# Patient Record
Sex: FEMALE | Race: WHITE | NOT HISPANIC OR LATINO | Employment: OTHER | URBAN - METROPOLITAN AREA
[De-identification: names, ages, dates, MRNs, and addresses within clinical notes are randomized per-mention and may not be internally consistent; named-entity substitution may affect disease eponyms.]

---

## 2017-01-20 ENCOUNTER — OFFICE VISIT (OUTPATIENT)
Dept: CARDIOLOGY | Facility: CLINIC | Age: 82
End: 2017-01-20

## 2017-01-20 VITALS
SYSTOLIC BLOOD PRESSURE: 120 MMHG | BODY MASS INDEX: 27.94 KG/M2 | HEART RATE: 90 BPM | DIASTOLIC BLOOD PRESSURE: 58 MMHG | WEIGHT: 142.3 LBS | HEIGHT: 60 IN

## 2017-01-20 DIAGNOSIS — I48.20 CHRONIC ATRIAL FIBRILLATION (HCC): Primary | ICD-10-CM

## 2017-01-20 DIAGNOSIS — E78.5 HYPERLIPIDEMIA, UNSPECIFIED HYPERLIPIDEMIA TYPE: ICD-10-CM

## 2017-01-20 DIAGNOSIS — I10 ESSENTIAL HYPERTENSION: ICD-10-CM

## 2017-01-20 PROCEDURE — 99214 OFFICE O/P EST MOD 30 MIN: CPT | Performed by: INTERNAL MEDICINE

## 2017-01-20 PROCEDURE — 93000 ELECTROCARDIOGRAM COMPLETE: CPT | Performed by: INTERNAL MEDICINE

## 2017-01-20 RX ORDER — CHLORTHALIDONE 25 MG/1
12.5 TABLET ORAL DAILY
Status: ON HOLD | COMMUNITY
Start: 2017-01-20 | End: 2017-08-25

## 2017-01-20 NOTE — PROGRESS NOTES
Date of Office Visit: 2017  Encounter Provider: Chana Jarvis MD  Place of Service: [facility] CARDIOLOGY  Patient Name: Gaby Diaz  :2/10/1931      Patient ID:  Gaby Diaz is a 85 y.o. female is here for  followup for a fib.         History of Present Illness    She has a known history of chronic underlying atrial fibrillation, hypertension, and  hyperlipidemia. She had a stress nuclear study done in 2010 with a preoperative  evaluation showing anterior ischemia but she had no active angina. She went on to get a  left knee replacement done. She had previously had a right knee replacement done.  Her last echocardiogram was done on 2010, showing an ejection fraction of  54% with marked biatrial enlargement, aortic valve calcification without stenosis, mild  mitral insufficiency, mild tricuspid insufficiency, and normal right ventricular systolic  Pressure.     She fell and fractured her hip and was treated at [facility]. It was the  left hip for which she got a replacement but she also has a lot of osteoarthritis in the  right hip also, and at some point may need that replaced as well.       The patient is doing okay.  She is getting around with a walker pretty well.   Her appetite is good.   She has no exertional chest pain or difficulty breathing.  She does not feel heart racing or skipping, and she has had no dizziness.  Her pulse pressure is kind of wide, given her blood pressure is 120/58.   As she ages, we talked about the fact that Chlorthalidone may not be the best medication for her. I do  know she gets some lower extremity edema, but I am always concerned about the risks of falls in older people taking Chlorthalidone.  Given that, we talked about decreasing her Chlorthalidone to 12.5 mg daily, and just having her elevate her legs to help with the edema in her lower extremities.  Overall, she has been feeling  pretty well.          .    Past Medical History   Diagnosis Date   • Arthritis of back    • Arthritis of neck    • Atrial fibrillation    • Hip arthrosis    • Hyperlipidemia    • Hypertension    • Knee swelling    • Myocardial ischemia      inferior wall   • Stroke syndrome          Past Surgical History   Procedure Laterality Date   • Hip surgery     • Replacement total knee Bilateral        Current Outpatient Prescriptions on File Prior to Visit   Medication Sig Dispense Refill   • chlorthalidone (HYGROTEN) 25 MG tablet Take  by mouth daily.     • ferrous sulfate 325 (65 FE) MG EC tablet Take  by mouth daily.     • FLUoxetine (PROzac) 20 MG capsule Take  by mouth daily.     • lisinopril (PRINIVIL,ZESTRIL) 5 MG tablet Take  by mouth daily.     • nitroglycerin (NITROSTAT) 0.4 MG SL tablet Place  under the tongue.     • simvastatin (ZOCOR) 20 MG tablet Take  by mouth.     • warfarin (COUMADIN) 5 MG tablet Take  by mouth.     • [DISCONTINUED] nitroglycerin (NITRO-BID) 6.5 MG CR capsule        No current facility-administered medications on file prior to visit.        Social History     Social History   • Marital status:      Spouse name: N/A   • Number of children: N/A   • Years of education: N/A     Occupational History   • Not on file.     Social History Main Topics   • Smoking status: Never Smoker   • Smokeless tobacco: Not on file   • Alcohol use No   • Drug use: Not on file   • Sexual activity: Not on file     Other Topics Concern   • Not on file     Social History Narrative           Review of Systems   Constitution: Negative.   HENT: Negative for congestion and headaches.    Eyes: Negative for vision loss in left eye and vision loss in right eye.   Respiratory: Negative.  Negative for cough, hemoptysis, shortness of breath, sleep disturbances due to breathing, snoring, sputum production and wheezing.    Endocrine: Negative.    Hematologic/Lymphatic: Negative.    Skin: Negative for poor wound healing and  "rash.   Musculoskeletal: Negative for falls, gout, muscle cramps and myalgias.   Gastrointestinal: Negative for abdominal pain, diarrhea, dysphagia, hematemesis, melena, nausea and vomiting.   Neurological: Negative for excessive daytime sleepiness, dizziness, light-headedness, loss of balance, seizures and vertigo.   Psychiatric/Behavioral: Negative for depression and substance abuse. The patient is not nervous/anxious.        Procedures    ECG 12 Lead  Date/Time: 1/20/2017 2:38 PM  Performed by: RONEL DAVENPORT  Authorized by: RONEL DAVENPORT   Comparison: compared with previous ECG   Rhythm: atrial fibrillation  Clinical impression: abnormal ECG               Objective:      Vitals:    01/20/17 1417   BP: 120/58   BP Location: Right arm   Patient Position: Sitting   Pulse: 90   Weight: 142 lb 4.8 oz (64.5 kg)   Height: 60\" (152.4 cm)     Body mass index is 27.79 kg/(m^2).    Physical Exam   Constitutional: She is oriented to person, place, and time. She appears well-developed and well-nourished. No distress.   HENT:   Head: Normocephalic and atraumatic.   Eyes: Conjunctivae are normal. No scleral icterus.   Neck: Neck supple. No JVD present. Carotid bruit is not present. No thyromegaly present.   Cardiovascular: Normal rate, S1 normal, S2 normal, normal heart sounds and intact distal pulses.  An irregularly irregular rhythm present.  No extrasystoles are present. PMI is not displaced.  Exam reveals no gallop.    No murmur heard.  Pulses:       Carotid pulses are 2+ on the right side, and 2+ on the left side.       Radial pulses are 2+ on the right side, and 2+ on the left side.        Dorsalis pedis pulses are 2+ on the right side, and 2+ on the left side.        Posterior tibial pulses are 2+ on the right side, and 2+ on the left side.   Left LE 1+ edema   Pulmonary/Chest: Effort normal and breath sounds normal. No respiratory distress. She has no wheezes. She has no rhonchi. She has no rales. She " exhibits no tenderness.   Abdominal: Soft. Bowel sounds are normal. She exhibits no distension, no abdominal bruit and no mass. There is no tenderness.   Musculoskeletal: She exhibits no edema or deformity.   Lymphadenopathy:     She has no cervical adenopathy.   Neurological: She is alert and oriented to person, place, and time. No cranial nerve deficit.   Skin: Skin is warm and dry. No rash noted. She is not diaphoretic. No cyanosis. No pallor. Nails show no clubbing.   Psychiatric: She has a normal mood and affect. Judgment normal.   Vitals reviewed.      Lab Review:       Assessment:      Diagnosis Plan   1. Chronic atrial fibrillation     2. Essential hypertension     3. Hyperlipidemia, unspecified hyperlipidemia type       1. Atrial fibrillation. Heart rate is well controlled. I do not plan to make any  medication changes. She is on Coumadin.  2. Hypertension, well controlled.  3. Hyperlipidemia, per Dr. Ford.  4. History of hypothyroidism, stable.   5. History of strokes.  6. Family history of cardiovascular disease.  7. Osteoarthritis.  8. Fatigue.  9. Abnormal stress study, asymptomatic with mild anterior ischemia treated medically.   10. History of biatrial enlargement.     Plan:       Decrease chlorthalidone to 12.5mg daily, see back in 6 months.

## 2017-01-20 NOTE — MR AVS SNAPSHOT
Gaby Diaz   1/20/2017 2:15 PM   Office Visit    Dept Phone:  [phone]   Encounter #:  [id number]    Provider:  Chana Jarvis MD   Department:  [facility] CARDIOLOGY                Your Full Care Plan              Today's Medication Changes          These changes are accurate as of: 1/20/17  2:55 PM.  If you have any questions, ask your nurse or doctor.               Medication(s)that have changed:     chlorthalidone 25 MG tablet   Commonly known as:  HYGROTON   Take 0.5 tablets by mouth Daily.   What changed:  how much to take   Changed by:  Chana Jarvis MD       NITROSTAT 0.4 MG SL tablet   Generic drug:  nitroglycerin   Place  under the tongue.   What changed:  Another medication with the same name was removed. Continue taking this medication, and follow the directions you see here.   Changed by:  Zeyad Meza MD                  Your Updated Medication List          This list is accurate as of: 1/20/17  2:55 PM.  Always use your most recent med list.                chlorthalidone 25 MG tablet   Commonly known as:  HYGROTON       ferrous sulfate 325 (65 FE) MG EC tablet       FLUoxetine 20 MG capsule   Commonly known as:  PROzac       lisinopril 5 MG tablet   Commonly known as:  PRINIVIL,ZESTRIL       NITROSTAT 0.4 MG SL tablet   Generic drug:  nitroglycerin       simvastatin 20 MG tablet   Commonly known as:  ZOCOR       warfarin 5 MG tablet   Commonly known as:  COUMADIN               We Performed the Following     ECG 12 Lead       You Were Diagnosed With        Codes Comments    Chronic atrial fibrillation    -  Primary ICD-10-CM: I48.2  ICD-9-CM: 427.31     Essential hypertension     ICD-10-CM: I10  ICD-9-CM: 401.9     Hyperlipidemia, unspecified hyperlipidemia type     ICD-10-CM: E78.5  ICD-9-CM: 272.4       Instructions     None    Patient Instructions History      Upcoming Appointments     Visit Type Date Time Department    "FOLLOW UP 2017  2:15 PM [facility]    FOLLOW UP 2017 12:30 PM [facility]      MyChart Signup     [email or web address] allows you to send messages to your doctor, view your test results, renew your prescriptions, schedule appointments, and more. To sign up, go to ticketscript and click on the Sign Up Now link in the New User? box. Enter your [email or web address] Activation Code exactly as it appears below along with the last four digits of your Social Security Number and your Date of Birth () to complete the sign-up process. If you do not sign up before the expiration date, you must request a new code.    [email or web address] Activation Code: [email or web address]  Expires: 2/3/2017  2:55 PM    If you have questions, you can email [email or web address] or call [phone] to talk to our [email or web address] staff. Remember, [email or web address] is NOT to be used for urgent needs. For medical emergencies, dial 911.               Other Info from Your Visit           Your Appointments     2017 12:30 PM EDT   Follow Up with Chana Jarvis MD   [facility] [place] [facility]ARDIOLOGY (--)    [address] [place] [id number] [phone]           Arrive 15 minutes prior to appointment.              Allergies     No Known Allergies      Reason for Visit     Atrial Fibrillation           Vital Signs     Blood Pressure Pulse Height Weight Body Mass Index Smoking Status    120/58 (BP Location: Right arm, Patient Position: Sitting) 90 60\" (152.4 cm) 142 lb 4.8 oz (64.5 kg) 27.79 kg/m2 Never Smoker      Problems and Diagnoses Noted     Atrial fibrillation (irregular heartbeat)    High cholesterol or triglycerides    High blood pressure        "

## 2017-04-24 DIAGNOSIS — M16.11 PRIMARY OSTEOARTHRITIS OF RIGHT HIP: Primary | ICD-10-CM

## 2017-04-25 ENCOUNTER — TELEPHONE (OUTPATIENT)
Dept: ORTHOPEDIC SURGERY | Facility: CLINIC | Age: 82
End: 2017-04-25

## 2017-04-28 ENCOUNTER — APPOINTMENT (OUTPATIENT)
Dept: GENERAL RADIOLOGY | Facility: HOSPITAL | Age: 82
End: 2017-04-28
Attending: ORTHOPAEDIC SURGERY

## 2017-05-04 ENCOUNTER — HOSPITAL ENCOUNTER (OUTPATIENT)
Dept: GENERAL RADIOLOGY | Facility: HOSPITAL | Age: 82
Discharge: HOME | End: 2017-05-04
Attending: ORTHOPAEDIC SURGERY | Admitting: ORTHOPAEDIC SURGERY

## 2017-05-04 DIAGNOSIS — M16.11 PRIMARY OSTEOARTHRITIS OF RIGHT HIP: ICD-10-CM

## 2017-05-04 PROCEDURE — 25010000002 TRIAMCINOLONE PER 10 MG: Performed by: ORTHOPAEDIC SURGERY

## 2017-05-04 PROCEDURE — 0 IOPAMIDOL 61 % SOLUTION: Performed by: ORTHOPAEDIC SURGERY

## 2017-05-04 PROCEDURE — 77002 NEEDLE LOCALIZATION BY XRAY: CPT

## 2017-05-04 RX ORDER — LIDOCAINE HYDROCHLORIDE 10 MG/ML
3 INJECTION, SOLUTION INFILTRATION; PERINEURAL ONCE
Status: COMPLETED | OUTPATIENT
Start: 2017-05-04 | End: 2017-05-04

## 2017-05-04 RX ORDER — TRIAMCINOLONE ACETONIDE 40 MG/ML
40 INJECTION, SUSPENSION INTRA-ARTICULAR; INTRAMUSCULAR ONCE
Status: COMPLETED | OUTPATIENT
Start: 2017-05-04 | End: 2017-05-04

## 2017-05-04 RX ORDER — LIDOCAINE HYDROCHLORIDE 10 MG/ML
5 INJECTION, SOLUTION INFILTRATION; PERINEURAL ONCE
Status: COMPLETED | OUTPATIENT
Start: 2017-05-04 | End: 2017-05-04

## 2017-05-04 RX ADMIN — IOPAMIDOL 10 ML: 612 INJECTION, SOLUTION INTRAVENOUS at 13:55

## 2017-05-04 RX ADMIN — TRIAMCINOLONE ACETONIDE 40 MG: 40 INJECTION, SUSPENSION INTRA-ARTICULAR; INTRAMUSCULAR at 14:00

## 2017-05-04 RX ADMIN — LIDOCAINE HYDROCHLORIDE 3 ML: 10 INJECTION, SOLUTION INFILTRATION; PERINEURAL at 13:50

## 2017-05-04 RX ADMIN — LIDOCAINE HYDROCHLORIDE 5 ML: 10 INJECTION, SOLUTION INFILTRATION; PERINEURAL at 14:00

## 2017-08-04 ENCOUNTER — OFFICE VISIT (OUTPATIENT)
Dept: CARDIOLOGY | Facility: CLINIC | Age: 82
End: 2017-08-04

## 2017-08-04 VITALS
HEART RATE: 89 BPM | DIASTOLIC BLOOD PRESSURE: 80 MMHG | BODY MASS INDEX: 27 KG/M2 | HEIGHT: 61 IN | SYSTOLIC BLOOD PRESSURE: 124 MMHG | WEIGHT: 143 LBS

## 2017-08-04 DIAGNOSIS — E78.5 HYPERLIPIDEMIA, UNSPECIFIED HYPERLIPIDEMIA TYPE: ICD-10-CM

## 2017-08-04 DIAGNOSIS — I48.20 CHRONIC ATRIAL FIBRILLATION (HCC): Primary | ICD-10-CM

## 2017-08-04 DIAGNOSIS — I10 ESSENTIAL HYPERTENSION: ICD-10-CM

## 2017-08-04 PROCEDURE — 99214 OFFICE O/P EST MOD 30 MIN: CPT | Performed by: INTERNAL MEDICINE

## 2017-08-04 PROCEDURE — 93000 ELECTROCARDIOGRAM COMPLETE: CPT | Performed by: INTERNAL MEDICINE

## 2017-08-04 RX ORDER — POTASSIUM CITRATE 10 MEQ/1
1 TABLET, EXTENDED RELEASE ORAL DAILY
Status: ON HOLD | COMMUNITY
Start: 2017-07-27 | End: 2017-08-25

## 2017-08-04 NOTE — PROGRESS NOTES
Date of Office Visit: 2017  Encounter Provider: Chana Jarvis MD  Place of Service: [facility] CARDIOLOGY  Patient Name: Gaby Diaz  :2/10/1931      Patient ID:  Gaby Diaz is a 86 y.o. female is here for  followup for atrial fibrillation        History of Present Illness    She has a known history of chronic underlying atrial fibrillation, hypertension, and  hyperlipidemia. She had a stress nuclear study done in 2010 with a preoperative  evaluation showing anterior ischemia but she had no active angina. She went on to get a  left knee replacement done. She had previously had a right knee replacement done.  Her last echocardiogram was done on 2010, showing an ejection fraction of  54% with marked biatrial enlargement, aortic valve calcification without stenosis, mild  mitral insufficiency, mild tricuspid insufficiency, and normal right ventricular systolic  Pressure.      She fell and fractured her hip and was treated at [facility]. It was the  left hip for which she got a replacement but she also has a lot of osteoarthritis in the  right hip also, and at some point may need that replaced as well.       She is walking with a walker and doing pretty good.  Her appetite is good.  She has no exertional chest pain or difficulty breathing.  She has no orthopnea or PND.  She doesn't feel her heart racing or skipping.  She has no dizziness.  She's had lower extremity edema which is got worse over the summer.  The right leg is worse the left.  She has a lot of right hip pain and is preparing to have right hip replacement.  Overall she feels like she's doing fairly well.    Past Medical History:   Diagnosis Date   • Arthritis of back    • Arthritis of neck    • Atrial fibrillation    • Hip arthrosis    • Hyperlipidemia    • Hypertension    • Knee swelling    • Myocardial ischemia     inferior wall   • Stroke syndrome          Past  Surgical History:   Procedure Laterality Date   • HIP SURGERY     • REPLACEMENT TOTAL KNEE Bilateral        Current Outpatient Prescriptions on File Prior to Visit   Medication Sig Dispense Refill   • chlorthalidone (HYGROTON) 25 MG tablet Take 0.5 tablets by mouth Daily.     • FLUoxetine (PROzac) 20 MG capsule Take  by mouth daily.     • lisinopril (PRINIVIL,ZESTRIL) 5 MG tablet Take  by mouth daily.     • nitroglycerin (NITROSTAT) 0.4 MG SL tablet Place  under the tongue.     • simvastatin (ZOCOR) 20 MG tablet Take  by mouth.     • warfarin (COUMADIN) 5 MG tablet Take  by mouth.     • [DISCONTINUED] ferrous sulfate 325 (65 FE) MG EC tablet Take  by mouth daily.       No current facility-administered medications on file prior to visit.        Social History     Social History   • Marital status:      Spouse name: N/A   • Number of children: N/A   • Years of education: N/A     Occupational History   • Not on file.     Social History Main Topics   • Smoking status: Never Smoker   • Smokeless tobacco: Not on file   • Alcohol use No   • Drug use: Not on file   • Sexual activity: Not on file     Other Topics Concern   • Not on file     Social History Narrative           Review of Systems   Constitution: Negative.   HENT: Negative for congestion and headaches.    Eyes: Negative for vision loss in left eye and vision loss in right eye.   Respiratory: Negative.  Negative for cough, hemoptysis, shortness of breath, sleep disturbances due to breathing, snoring, sputum production and wheezing.    Endocrine: Negative.    Hematologic/Lymphatic: Negative.    Skin: Negative for poor wound healing and rash.   Musculoskeletal: Positive for joint pain and joint swelling. Negative for falls, gout, muscle cramps and myalgias.   Gastrointestinal: Negative for abdominal pain, diarrhea, dysphagia, hematemesis, melena, nausea and vomiting.   Neurological: Negative for excessive daytime sleepiness, dizziness, light-headedness, loss  "of balance, seizures and vertigo.   Psychiatric/Behavioral: Negative for depression and substance abuse. The patient is not nervous/anxious.        Procedures    ECG 12 Lead  Date/Time: 8/4/2017 1:58 PM  Performed by: RONEL DAVENPORT  Authorized by: RONEL DAVENPORT   Comparison: compared with previous ECG   Similar to previous ECG  Rhythm: atrial fibrillation  QRS axis: left  Clinical impression: abnormal ECG               Objective:      Vitals:    08/04/17 1343   BP: 124/80   BP Location: Left arm   Patient Position: Sitting   Pulse: 89   Weight: 143 lb (64.9 kg)   Height: 61\" (154.9 cm)     Body mass index is 27.02 kg/(m^2).    Physical Exam   Constitutional: She is oriented to person, place, and time. She appears well-developed and well-nourished. No distress.   HENT:   Head: Normocephalic and atraumatic.   Eyes: Conjunctivae are normal. No scleral icterus.   Neck: Neck supple. No JVD present. Carotid bruit is not present. No thyromegaly present.   Cardiovascular: Normal rate, S1 normal, S2 normal, normal heart sounds and intact distal pulses.  An irregularly irregular rhythm present.  No extrasystoles are present. PMI is not displaced.  Exam reveals no gallop.    No murmur heard.  Pulses:       Carotid pulses are 2+ on the right side, and 2+ on the left side.       Radial pulses are 2+ on the right side, and 2+ on the left side.        Dorsalis pedis pulses are 2+ on the right side, and 2+ on the left side.        Posterior tibial pulses are 2+ on the right side, and 2+ on the left side.   1+ LE edema   Pulmonary/Chest: Effort normal and breath sounds normal. No respiratory distress. She has no wheezes. She has no rhonchi. She has no rales. She exhibits no tenderness.   Abdominal: Soft. Bowel sounds are normal. She exhibits no distension, no abdominal bruit and no mass. There is no tenderness.   Musculoskeletal: She exhibits no edema or deformity.   Lymphadenopathy:     She has no cervical adenopathy. "   Neurological: She is alert and oriented to person, place, and time. No cranial nerve deficit.   Skin: Skin is warm and dry. No rash noted. She is not diaphoretic. No cyanosis. No pallor. Nails show no clubbing.   Psychiatric: She has a normal mood and affect. Judgment normal.   Vitals reviewed.      Lab Review:       Assessment:      Diagnosis Plan   1. Chronic atrial fibrillation     2. Hyperlipidemia, unspecified hyperlipidemia type     3. Essential hypertension          1. Atrial fibrillation. Heart rate is well controlled. I do not plan to make any  medication changes. She is on Coumadin.  2. Hypertension, well controlled.  3. Hyperlipidemia, per Dr. Ford.  4. History of hypothyroidism, stable.   5. History of strokes.  6. Family history of cardiovascular disease.  7. Osteoarthritis.  8. Fatigue.  9. Abnormal stress study, asymptomatic with mild anterior ischemia treated medically. She has no angina or CHF.   10. History of biatrial enlargement.  11. LE edema due to venous insufficiency, OA and warm summer weather.  Use chlorthalidone.   12. Right hip OA, she is low risk for surgery and may proceed after echo.  Would get this first.      Plan:       See back in 6 months, no changes.      Atrial Fibrillation and Atrial Flutter  Assessment  • The patient has permanent atrial fibrillation  • This is non-valvular in etiology  • The patient's CHADS2-VASc score is 4  • A [id number] score of 2 or more is considered a high risk for a thromboembolic event  • Warfarin prescribed    Plan  • Continue in atrial fibrillation with rate control  • Continue warfarin for antithrombotic therapy, bleeding issues discussed

## 2017-08-12 ENCOUNTER — LAB (OUTPATIENT)
Dept: LAB | Facility: HOSPITAL | Age: 82
End: 2017-08-12

## 2017-08-12 ENCOUNTER — TRANSCRIBE ORDERS (OUTPATIENT)
Dept: ADMINISTRATIVE | Facility: HOSPITAL | Age: 82
End: 2017-08-12

## 2017-08-12 DIAGNOSIS — Z79.899 DRUG THERAPY: ICD-10-CM

## 2017-08-12 DIAGNOSIS — Z79.899 DRUG THERAPY: Primary | ICD-10-CM

## 2017-08-12 LAB
INR PPP: 2.29 (ref 0.9–1.1)
PROTHROMBIN TIME: 25.6 SECONDS (ref 12.1–15)

## 2017-08-12 PROCEDURE — 85610 PROTHROMBIN TIME: CPT

## 2017-08-12 PROCEDURE — 36415 COLL VENOUS BLD VENIPUNCTURE: CPT

## 2017-08-14 ENCOUNTER — HOSPITAL ENCOUNTER (OUTPATIENT)
Dept: CARDIOLOGY | Facility: HOSPITAL | Age: 82
Discharge: HOME | End: 2017-08-14
Attending: INTERNAL MEDICINE | Admitting: INTERNAL MEDICINE

## 2017-08-14 VITALS
DIASTOLIC BLOOD PRESSURE: 69 MMHG | SYSTOLIC BLOOD PRESSURE: 140 MMHG | HEIGHT: 61 IN | WEIGHT: 143 LBS | BODY MASS INDEX: 27 KG/M2

## 2017-08-14 DIAGNOSIS — I10 ESSENTIAL HYPERTENSION: ICD-10-CM

## 2017-08-14 DIAGNOSIS — I48.20 CHRONIC ATRIAL FIBRILLATION (HCC): ICD-10-CM

## 2017-08-14 LAB
ASCENDING AORTA: 3.3 CM
BH CV ECHO MEAS - ACS: 1.6 CM
BH CV ECHO MEAS - AI DEC SLOPE: 190 CM/SEC^2
BH CV ECHO MEAS - AI MAX PG: 67.9 MMHG
BH CV ECHO MEAS - AI MAX VEL: 412 CM/SEC
BH CV ECHO MEAS - AI P1/2T: 635.1 MSEC
BH CV ECHO MEAS - AO MAX PG (FULL): 0.1 MMHG
BH CV ECHO MEAS - AO MAX PG: 6.6 MMHG
BH CV ECHO MEAS - AO MEAN PG (FULL): 0 MMHG
BH CV ECHO MEAS - AO MEAN PG: 3 MMHG
BH CV ECHO MEAS - AO ROOT AREA (BSA CORRECTED): 1.8
BH CV ECHO MEAS - AO ROOT AREA: 6.6 CM^2
BH CV ECHO MEAS - AO ROOT DIAM: 2.9 CM
BH CV ECHO MEAS - AO V2 MAX: 128 CM/SEC
BH CV ECHO MEAS - AO V2 MEAN: 83.9 CM/SEC
BH CV ECHO MEAS - AO V2 VTI: 24.5 CM
BH CV ECHO MEAS - ASC AORTA: 3.3 CM
BH CV ECHO MEAS - AVA(I,A): 3.1 CM^2
BH CV ECHO MEAS - AVA(I,D): 3.1 CM^2
BH CV ECHO MEAS - AVA(V,A): 3.1 CM^2
BH CV ECHO MEAS - AVA(V,D): 3.1 CM^2
BH CV ECHO MEAS - BSA(HAYCOCK): 1.7 M^2
BH CV ECHO MEAS - BSA: 1.6 M^2
BH CV ECHO MEAS - BZI_BMI: 27 KILOGRAMS/M^2
BH CV ECHO MEAS - BZI_METRIC_HEIGHT: 154.9 CM
BH CV ECHO MEAS - BZI_METRIC_WEIGHT: 64.9 KG
BH CV ECHO MEAS - CONTRAST EF (2CH): 52.1 ML/M^2
BH CV ECHO MEAS - CONTRAST EF 4CH: 60.1 ML/M^2
BH CV ECHO MEAS - EDV(CUBED): 106.5 ML
BH CV ECHO MEAS - EDV(MOD-SP2): 74.6 ML
BH CV ECHO MEAS - EDV(MOD-SP4): 80.9 ML
BH CV ECHO MEAS - EDV(TEICH): 104.4 ML
BH CV ECHO MEAS - EF(CUBED): 70.1 %
BH CV ECHO MEAS - EF(MOD-SP2): 52.1 %
BH CV ECHO MEAS - EF(MOD-SP4): 60.1 %
BH CV ECHO MEAS - EF(TEICH): 61.7 %
BH CV ECHO MEAS - ESV(CUBED): 31.9 ML
BH CV ECHO MEAS - ESV(MOD-SP2): 35.7 ML
BH CV ECHO MEAS - ESV(MOD-SP4): 32.3 ML
BH CV ECHO MEAS - ESV(TEICH): 40 ML
BH CV ECHO MEAS - FS: 33.1 %
BH CV ECHO MEAS - IVS/LVPW: 1.1
BH CV ECHO MEAS - IVSD: 0.93 CM
BH CV ECHO MEAS - LAT PEAK E' VEL: 15 CM/SEC
BH CV ECHO MEAS - LV DIASTOLIC VOL/BSA (35-75): 49.4 ML/M^2
BH CV ECHO MEAS - LV MASS(C)D: 142.8 GRAMS
BH CV ECHO MEAS - LV MASS(C)DI: 87.2 GRAMS/M^2
BH CV ECHO MEAS - LV MAX PG: 6.5 MMHG
BH CV ECHO MEAS - LV MEAN PG: 3 MMHG
BH CV ECHO MEAS - LV SYSTOLIC VOL/BSA (12-30): 19.7 ML/M^2
BH CV ECHO MEAS - LV V1 MAX: 127 CM/SEC
BH CV ECHO MEAS - LV V1 MEAN: 73.2 CM/SEC
BH CV ECHO MEAS - LV V1 VTI: 23.9 CM
BH CV ECHO MEAS - LVIDD: 4.7 CM
BH CV ECHO MEAS - LVIDS: 3.2 CM
BH CV ECHO MEAS - LVLD AP2: 6.9 CM
BH CV ECHO MEAS - LVLD AP4: 7 CM
BH CV ECHO MEAS - LVLS AP2: 5.7 CM
BH CV ECHO MEAS - LVLS AP4: 5.7 CM
BH CV ECHO MEAS - LVOT AREA (M): 3.1 CM^2
BH CV ECHO MEAS - LVOT AREA: 3.1 CM^2
BH CV ECHO MEAS - LVOT DIAM: 2 CM
BH CV ECHO MEAS - LVPWD: 0.85 CM
BH CV ECHO MEAS - MED PEAK E' VEL: 11 CM/SEC
BH CV ECHO MEAS - MR MAX PG: 77.9 MMHG
BH CV ECHO MEAS - MR MAX VEL: 435.3 CM/SEC
BH CV ECHO MEAS - MV DEC SLOPE: 600 CM/SEC^2
BH CV ECHO MEAS - MV DEC TIME: 0.16 SEC
BH CV ECHO MEAS - MV E MAX VEL: 116 CM/SEC
BH CV ECHO MEAS - MV MAX PG: 7.1 MMHG
BH CV ECHO MEAS - MV MEAN PG: 2 MMHG
BH CV ECHO MEAS - MV P1/2T MAX VEL: 140 CM/SEC
BH CV ECHO MEAS - MV P1/2T: 68.3 MSEC
BH CV ECHO MEAS - MV V2 MAX: 133 CM/SEC
BH CV ECHO MEAS - MV V2 MEAN: 68.9 CM/SEC
BH CV ECHO MEAS - MV V2 VTI: 29.2 CM
BH CV ECHO MEAS - MVA P1/2T LCG: 1.6 CM^2
BH CV ECHO MEAS - MVA(P1/2T): 3.2 CM^2
BH CV ECHO MEAS - MVA(VTI): 2.6 CM^2
BH CV ECHO MEAS - PA ACC TIME: 0.09 SEC
BH CV ECHO MEAS - PA MAX PG (FULL): 2.7 MMHG
BH CV ECHO MEAS - PA MAX PG: 3.8 MMHG
BH CV ECHO MEAS - PA PR(ACCEL): 37.6 MMHG
BH CV ECHO MEAS - PA V2 MAX: 97.7 CM/SEC
BH CV ECHO MEAS - PULM A REVS DUR: 0.1 SEC
BH CV ECHO MEAS - PULM A REVS VEL: 26.2 CM/SEC
BH CV ECHO MEAS - PULM DIAS VEL: 69.6 CM/SEC
BH CV ECHO MEAS - PULM S/D: 0.8
BH CV ECHO MEAS - PULM SYS VEL: 55.4 CM/SEC
BH CV ECHO MEAS - PVA(V,A): 1.5 CM^2
BH CV ECHO MEAS - PVA(V,D): 1.5 CM^2
BH CV ECHO MEAS - QP/QS: 0.44
BH CV ECHO MEAS - RAP SYSTOLE: 8 MMHG
BH CV ECHO MEAS - RV MAX PG: 1.1 MMHG
BH CV ECHO MEAS - RV MEAN PG: 1 MMHG
BH CV ECHO MEAS - RV V1 MAX: 51.7 CM/SEC
BH CV ECHO MEAS - RV V1 MEAN: 35.5 CM/SEC
BH CV ECHO MEAS - RV V1 VTI: 11.7 CM
BH CV ECHO MEAS - RVOT AREA: 2.8 CM^2
BH CV ECHO MEAS - RVOT DIAM: 1.9 CM
BH CV ECHO MEAS - RVSP: 50 MMHG
BH CV ECHO MEAS - SI(AO): 98.8 ML/M^2
BH CV ECHO MEAS - SI(CUBED): 45.6 ML/M^2
BH CV ECHO MEAS - SI(LVOT): 45.8 ML/M^2
BH CV ECHO MEAS - SI(MOD-SP2): 23.7 ML/M^2
BH CV ECHO MEAS - SI(MOD-SP4): 29.7 ML/M^2
BH CV ECHO MEAS - SI(TEICH): 39.3 ML/M^2
BH CV ECHO MEAS - SV(AO): 161.8 ML
BH CV ECHO MEAS - SV(CUBED): 74.6 ML
BH CV ECHO MEAS - SV(LVOT): 75.1 ML
BH CV ECHO MEAS - SV(MOD-SP2): 38.9 ML
BH CV ECHO MEAS - SV(MOD-SP4): 48.6 ML
BH CV ECHO MEAS - SV(RVOT): 33.2 ML
BH CV ECHO MEAS - SV(TEICH): 64.4 ML
BH CV ECHO MEAS - TAPSE (>1.6): 1.9 CM2
BH CV ECHO MEAS - TR MAX VEL: 287.3 CM/SEC
BH CV VAS BP RIGHT ARM: NORMAL MMHG
BH CV XLRA - RV BASE: 3.6 CM
BH CV XLRA - TDI S': 10 CM/SEC
E/E' RATIO: 10
LEFT ATRIUM VOLUME INDEX: 85 ML/M2

## 2017-08-14 PROCEDURE — 93306 TTE W/DOPPLER COMPLETE: CPT | Performed by: INTERNAL MEDICINE

## 2017-08-14 PROCEDURE — 93306 TTE W/DOPPLER COMPLETE: CPT

## 2017-08-21 ENCOUNTER — TELEPHONE (OUTPATIENT)
Dept: CARDIOLOGY | Facility: CLINIC | Age: 82
End: 2017-08-21

## 2017-08-21 NOTE — TELEPHONE ENCOUNTER
Ivette from Dr Pierre called re clearance for Right Total Hip surgery on Wednesday. Pt had echo on 8/14 and wanted to make sure pt was still cleared.     Ivette can be reached at [phone]. Fax is [phone]

## 2017-08-25 ENCOUNTER — HOSPITAL ENCOUNTER (INPATIENT)
Facility: HOSPITAL | Age: 82
LOS: 12 days | Discharge: HOME HEALTH | End: 2017-09-06
Attending: FAMILY MEDICINE | Admitting: FAMILY MEDICINE

## 2017-08-25 PROBLEM — Z51.89 ENCOUNTER FOR REHABILITATION: Status: ACTIVE | Noted: 2017-08-25

## 2017-08-25 PROCEDURE — 25010000002 ENOXAPARIN PER 10 MG: Performed by: FAMILY MEDICINE

## 2017-08-25 PROCEDURE — 87081 CULTURE SCREEN ONLY: CPT | Performed by: FAMILY MEDICINE

## 2017-08-25 RX ORDER — SENNA AND DOCUSATE SODIUM 50; 8.6 MG/1; MG/1
2 TABLET, FILM COATED ORAL 2 TIMES DAILY
COMMUNITY
End: 2018-03-12 | Stop reason: SDUPTHER

## 2017-08-25 RX ORDER — HYDROCODONE BITARTRATE AND ACETAMINOPHEN 7.5; 325 MG/1; MG/1
1 TABLET ORAL EVERY 4 HOURS PRN
Status: DISCONTINUED | OUTPATIENT
Start: 2017-08-25 | End: 2017-09-06 | Stop reason: HOSPADM

## 2017-08-25 RX ORDER — ONDANSETRON 2 MG/ML
4 INJECTION INTRAMUSCULAR; INTRAVENOUS EVERY 6 HOURS PRN
Status: DISCONTINUED | OUTPATIENT
Start: 2017-08-25 | End: 2017-09-06 | Stop reason: HOSPADM

## 2017-08-25 RX ORDER — SENNA AND DOCUSATE SODIUM 50; 8.6 MG/1; MG/1
2 TABLET, FILM COATED ORAL 2 TIMES DAILY
Status: DISCONTINUED | OUTPATIENT
Start: 2017-08-25 | End: 2017-09-06 | Stop reason: HOSPADM

## 2017-08-25 RX ORDER — SENNA AND DOCUSATE SODIUM 50; 8.6 MG/1; MG/1
2 TABLET, FILM COATED ORAL 2 TIMES DAILY PRN
Status: DISCONTINUED | OUTPATIENT
Start: 2017-08-25 | End: 2017-08-25 | Stop reason: SDUPTHER

## 2017-08-25 RX ORDER — WARFARIN SODIUM 2.5 MG/1
2.5 TABLET ORAL
COMMUNITY
End: 2017-09-06 | Stop reason: HOSPADM

## 2017-08-25 RX ORDER — FLUOXETINE HYDROCHLORIDE 20 MG/1
20 CAPSULE ORAL DAILY
Status: DISCONTINUED | OUTPATIENT
Start: 2017-08-26 | End: 2017-09-06 | Stop reason: HOSPADM

## 2017-08-25 RX ORDER — BISACODYL 5 MG/1
5 TABLET, DELAYED RELEASE ORAL DAILY PRN
Status: DISCONTINUED | OUTPATIENT
Start: 2017-08-25 | End: 2017-09-06 | Stop reason: HOSPADM

## 2017-08-25 RX ORDER — ACETAMINOPHEN 325 MG/1
650 TABLET ORAL EVERY 4 HOURS PRN
Status: DISCONTINUED | OUTPATIENT
Start: 2017-08-25 | End: 2017-09-06 | Stop reason: HOSPADM

## 2017-08-25 RX ORDER — ONDANSETRON 4 MG/1
4 TABLET, ORALLY DISINTEGRATING ORAL EVERY 6 HOURS PRN
Status: DISCONTINUED | OUTPATIENT
Start: 2017-08-25 | End: 2017-09-06 | Stop reason: HOSPADM

## 2017-08-25 RX ORDER — MULTIPLE VITAMINS W/ MINERALS TAB 9MG-400MCG
1 TAB ORAL DAILY
Status: DISCONTINUED | OUTPATIENT
Start: 2017-08-26 | End: 2017-09-06 | Stop reason: HOSPADM

## 2017-08-25 RX ORDER — CHLORDIAZEPOXIDE HYDROCHLORIDE 5 MG/1
5 CAPSULE, GELATIN COATED ORAL 3 TIMES DAILY PRN
Status: DISCONTINUED | OUTPATIENT
Start: 2017-08-25 | End: 2017-09-06 | Stop reason: HOSPADM

## 2017-08-25 RX ORDER — ONDANSETRON 4 MG/1
4 TABLET, FILM COATED ORAL EVERY 6 HOURS PRN
Status: DISCONTINUED | OUTPATIENT
Start: 2017-08-25 | End: 2017-09-06 | Stop reason: HOSPADM

## 2017-08-25 RX ORDER — CHLORDIAZEPOXIDE HYDROCHLORIDE 5 MG/1
5 CAPSULE, GELATIN COATED ORAL 3 TIMES DAILY PRN
COMMUNITY
End: 2017-09-06 | Stop reason: HOSPADM

## 2017-08-25 RX ORDER — CALCIUM CARBONATE 200(500)MG
1 TABLET,CHEWABLE ORAL 3 TIMES DAILY PRN
Status: DISCONTINUED | OUTPATIENT
Start: 2017-08-25 | End: 2017-09-06 | Stop reason: HOSPADM

## 2017-08-25 RX ORDER — ATORVASTATIN CALCIUM 10 MG/1
10 TABLET, FILM COATED ORAL DAILY
Status: DISCONTINUED | OUTPATIENT
Start: 2017-08-26 | End: 2017-09-06 | Stop reason: HOSPADM

## 2017-08-25 RX ORDER — MULTIPLE VITAMINS W/ MINERALS TAB 9MG-400MCG
1 TAB ORAL DAILY
COMMUNITY
End: 2018-03-12 | Stop reason: HOSPADM

## 2017-08-25 RX ORDER — NITROGLYCERIN 0.4 MG/1
0.4 TABLET SUBLINGUAL
Status: DISCONTINUED | OUTPATIENT
Start: 2017-08-25 | End: 2017-09-06 | Stop reason: HOSPADM

## 2017-08-25 RX ORDER — HYDROCODONE BITARTRATE AND ACETAMINOPHEN 7.5; 325 MG/1; MG/1
1-2 TABLET ORAL EVERY 4 HOURS PRN
COMMUNITY
End: 2017-09-06 | Stop reason: HOSPADM

## 2017-08-25 RX ORDER — WARFARIN SODIUM 2.5 MG/1
2.5 TABLET ORAL
Status: DISCONTINUED | OUTPATIENT
Start: 2017-08-25 | End: 2017-08-26 | Stop reason: DRUGHIGH

## 2017-08-25 RX ADMIN — WARFARIN SODIUM 2.5 MG: 2.5 TABLET ORAL at 18:05

## 2017-08-25 RX ADMIN — ANTACID TABLETS 1 TABLET: 500 TABLET, CHEWABLE ORAL at 18:05

## 2017-08-25 RX ADMIN — ANTACID TABLETS 1 TABLET: 500 TABLET, CHEWABLE ORAL at 20:37

## 2017-08-25 RX ADMIN — TUBERCULIN PURIFIED PROTEIN DERIVATIVE 5 UNITS: 5 INJECTION INTRADERMAL at 16:19

## 2017-08-25 RX ADMIN — ENOXAPARIN SODIUM 30 MG: 30 INJECTION SUBCUTANEOUS at 20:36

## 2017-08-25 RX ADMIN — HYDROCODONE BITARTRATE AND ACETAMINOPHEN 1 TABLET: 7.5; 325 TABLET ORAL at 20:37

## 2017-08-26 LAB
ANION GAP SERPL CALCULATED.3IONS-SCNC: 9.2 MMOL/L
BUN BLD-MCNC: 32 MG/DL (ref 8–23)
BUN/CREAT SERPL: 30.5 (ref 7–25)
CALCIUM SPEC-SCNC: 8.6 MG/DL (ref 8.8–10.5)
CHLORIDE SERPL-SCNC: 105 MMOL/L (ref 98–107)
CO2 SERPL-SCNC: 24.8 MMOL/L (ref 22–29)
CREAT BLD-MCNC: 1.05 MG/DL (ref 0.57–1)
DEPRECATED RDW RBC AUTO: 47.1 FL (ref 37–54)
ERYTHROCYTE [DISTWIDTH] IN BLOOD BY AUTOMATED COUNT: 13.1 % (ref 11.5–14.5)
GFR SERPL CREATININE-BSD FRML MDRD: 50 ML/MIN/1.73
GLUCOSE BLD-MCNC: 87 MG/DL (ref 65–99)
HCT VFR BLD AUTO: 27.3 % (ref 37–47)
HGB BLD-MCNC: 8.7 G/DL (ref 12–16)
INR PPP: 1.66 (ref 0.9–1.1)
MCH RBC QN AUTO: 31.3 PG (ref 27–31)
MCHC RBC AUTO-ENTMCNC: 31.9 G/DL (ref 31–37)
MCV RBC AUTO: 98.2 FL (ref 81–99)
PLATELET # BLD AUTO: 157 10*3/MM3 (ref 140–500)
PMV BLD AUTO: 11.1 FL (ref 7.4–10.4)
POTASSIUM BLD-SCNC: 3.8 MMOL/L (ref 3.5–5.2)
PROTHROMBIN TIME: 19.8 SECONDS (ref 12.1–15)
RBC # BLD AUTO: 2.78 10*6/MM3 (ref 4.2–5.4)
SODIUM BLD-SCNC: 139 MMOL/L (ref 136–145)
WBC NRBC COR # BLD: 9.3 10*3/MM3 (ref 4.8–10.8)

## 2017-08-26 PROCEDURE — 85027 COMPLETE CBC AUTOMATED: CPT | Performed by: FAMILY MEDICINE

## 2017-08-26 PROCEDURE — 80048 BASIC METABOLIC PNL TOTAL CA: CPT | Performed by: FAMILY MEDICINE

## 2017-08-26 PROCEDURE — 97161 PT EVAL LOW COMPLEX 20 MIN: CPT

## 2017-08-26 PROCEDURE — 97165 OT EVAL LOW COMPLEX 30 MIN: CPT

## 2017-08-26 PROCEDURE — 97535 SELF CARE MNGMENT TRAINING: CPT

## 2017-08-26 PROCEDURE — 85610 PROTHROMBIN TIME: CPT | Performed by: FAMILY MEDICINE

## 2017-08-26 PROCEDURE — 25010000002 ENOXAPARIN PER 10 MG: Performed by: FAMILY MEDICINE

## 2017-08-26 PROCEDURE — 97110 THERAPEUTIC EXERCISES: CPT

## 2017-08-26 RX ORDER — WARFARIN SODIUM 10 MG/1
10 TABLET ORAL
Status: COMPLETED | OUTPATIENT
Start: 2017-08-26 | End: 2017-08-26

## 2017-08-26 RX ORDER — WARFARIN SODIUM 2.5 MG/1
2.5 TABLET ORAL
Status: DISCONTINUED | OUTPATIENT
Start: 2017-08-27 | End: 2017-08-27

## 2017-08-26 RX ADMIN — WARFARIN SODIUM 10 MG: 10 TABLET ORAL at 17:31

## 2017-08-26 RX ADMIN — HYDROCODONE BITARTRATE AND ACETAMINOPHEN 1 TABLET: 7.5; 325 TABLET ORAL at 09:25

## 2017-08-26 RX ADMIN — ENOXAPARIN SODIUM 30 MG: 30 INJECTION SUBCUTANEOUS at 22:13

## 2017-08-26 RX ADMIN — DOCUSATE SODIUM AND SENNOSIDES 2 TABLET: 8.6; 5 TABLET, FILM COATED ORAL at 09:24

## 2017-08-26 RX ADMIN — Medication 1 TABLET: at 09:24

## 2017-08-26 RX ADMIN — HYDROCODONE BITARTRATE AND ACETAMINOPHEN 1 TABLET: 7.5; 325 TABLET ORAL at 22:12

## 2017-08-26 RX ADMIN — ATORVASTATIN CALCIUM 10 MG: 10 TABLET, FILM COATED ORAL at 09:25

## 2017-08-26 RX ADMIN — ENOXAPARIN SODIUM 30 MG: 30 INJECTION SUBCUTANEOUS at 09:25

## 2017-08-26 RX ADMIN — FLUOXETINE 20 MG: 20 CAPSULE ORAL at 09:25

## 2017-08-27 LAB
INR PPP: 2.12 (ref 0.9–1.1)
MRSA SPEC QL CULT: NORMAL
PROTHROMBIN TIME: 24.1 SECONDS (ref 12.1–15)
VRE SPEC QL CULT: NORMAL

## 2017-08-27 PROCEDURE — 85610 PROTHROMBIN TIME: CPT | Performed by: FAMILY MEDICINE

## 2017-08-27 PROCEDURE — 97110 THERAPEUTIC EXERCISES: CPT

## 2017-08-27 RX ORDER — WARFARIN SODIUM 3 MG/1
3 TABLET ORAL
Status: DISCONTINUED | OUTPATIENT
Start: 2017-08-27 | End: 2017-08-29

## 2017-08-27 RX ADMIN — DOCUSATE SODIUM AND SENNOSIDES 2 TABLET: 8.6; 5 TABLET, FILM COATED ORAL at 08:37

## 2017-08-27 RX ADMIN — HYDROCODONE BITARTRATE AND ACETAMINOPHEN 1 TABLET: 7.5; 325 TABLET ORAL at 21:46

## 2017-08-27 RX ADMIN — Medication 1 TABLET: at 08:37

## 2017-08-27 RX ADMIN — FLUOXETINE 20 MG: 20 CAPSULE ORAL at 08:37

## 2017-08-27 RX ADMIN — WARFARIN SODIUM 3 MG: 3 TABLET ORAL at 17:21

## 2017-08-27 RX ADMIN — HYDROCODONE BITARTRATE AND ACETAMINOPHEN 1 TABLET: 7.5; 325 TABLET ORAL at 09:23

## 2017-08-27 RX ADMIN — ATORVASTATIN CALCIUM 10 MG: 10 TABLET, FILM COATED ORAL at 08:37

## 2017-08-28 LAB
INR PPP: 4.02 (ref 0.9–1.1)
PROTHROMBIN TIME: 40.2 SECONDS (ref 12.1–15)

## 2017-08-28 PROCEDURE — 85610 PROTHROMBIN TIME: CPT | Performed by: FAMILY MEDICINE

## 2017-08-28 PROCEDURE — 97535 SELF CARE MNGMENT TRAINING: CPT

## 2017-08-28 PROCEDURE — 97116 GAIT TRAINING THERAPY: CPT

## 2017-08-28 PROCEDURE — 97110 THERAPEUTIC EXERCISES: CPT

## 2017-08-28 RX ADMIN — DOCUSATE SODIUM AND SENNOSIDES 2 TABLET: 8.6; 5 TABLET, FILM COATED ORAL at 17:47

## 2017-08-28 RX ADMIN — Medication 1 TABLET: at 09:50

## 2017-08-28 RX ADMIN — DOCUSATE SODIUM AND SENNOSIDES 2 TABLET: 8.6; 5 TABLET, FILM COATED ORAL at 09:50

## 2017-08-28 RX ADMIN — ATORVASTATIN CALCIUM 10 MG: 10 TABLET, FILM COATED ORAL at 09:50

## 2017-08-28 RX ADMIN — WARFARIN SODIUM 3 MG: 3 TABLET ORAL at 17:47

## 2017-08-28 RX ADMIN — FLUOXETINE 20 MG: 20 CAPSULE ORAL at 09:50

## 2017-08-28 RX ADMIN — HYDROCODONE BITARTRATE AND ACETAMINOPHEN 1 TABLET: 7.5; 325 TABLET ORAL at 09:50

## 2017-08-28 RX ADMIN — HYDROCODONE BITARTRATE AND ACETAMINOPHEN 1 TABLET: 7.5; 325 TABLET ORAL at 15:19

## 2017-08-29 LAB
DEPRECATED RDW RBC AUTO: 47.4 FL (ref 37–54)
ERYTHROCYTE [DISTWIDTH] IN BLOOD BY AUTOMATED COUNT: 13.1 % (ref 11.5–14.5)
FOLATE SERPL-MCNC: 10.84 NG/ML (ref 4.78–24.2)
HCT VFR BLD AUTO: 28.4 % (ref 37–47)
HGB BLD-MCNC: 9.1 G/DL (ref 12–16)
INR PPP: 4.14 (ref 0.9–1.1)
IRON 24H UR-MRATE: 33 MCG/DL (ref 37–145)
IRON SATN MFR SERPL: 20 %
MCH RBC QN AUTO: 31.5 PG (ref 27–31)
MCHC RBC AUTO-ENTMCNC: 32 G/DL (ref 31–37)
MCV RBC AUTO: 98.3 FL (ref 81–99)
PLATELET # BLD AUTO: 232 10*3/MM3 (ref 140–500)
PMV BLD AUTO: 10.2 FL (ref 7.4–10.4)
PROTHROMBIN TIME: 41.1 SECONDS (ref 12.1–15)
RBC # BLD AUTO: 2.89 10*6/MM3 (ref 4.2–5.4)
RETICS/RBC NFR AUTO: 2.05 % (ref 0.5–1.5)
TIBC SERPL-MCNC: 167 MCG/DL (ref 261–478)
UIBC SERPL-MCNC: 134 MCG/DL (ref 112–346)
VIT B12 BLD-MCNC: 261 PG/ML (ref 211–946)
WBC NRBC COR # BLD: 7.86 10*3/MM3 (ref 4.8–10.8)

## 2017-08-29 PROCEDURE — 85027 COMPLETE CBC AUTOMATED: CPT | Performed by: FAMILY MEDICINE

## 2017-08-29 PROCEDURE — 82607 VITAMIN B-12: CPT | Performed by: FAMILY MEDICINE

## 2017-08-29 PROCEDURE — 85610 PROTHROMBIN TIME: CPT | Performed by: FAMILY MEDICINE

## 2017-08-29 PROCEDURE — 82746 ASSAY OF FOLIC ACID SERUM: CPT | Performed by: FAMILY MEDICINE

## 2017-08-29 PROCEDURE — 97535 SELF CARE MNGMENT TRAINING: CPT

## 2017-08-29 PROCEDURE — 97116 GAIT TRAINING THERAPY: CPT

## 2017-08-29 PROCEDURE — 85045 AUTOMATED RETICULOCYTE COUNT: CPT | Performed by: FAMILY MEDICINE

## 2017-08-29 PROCEDURE — 83540 ASSAY OF IRON: CPT | Performed by: FAMILY MEDICINE

## 2017-08-29 PROCEDURE — 83550 IRON BINDING TEST: CPT | Performed by: FAMILY MEDICINE

## 2017-08-29 PROCEDURE — 97110 THERAPEUTIC EXERCISES: CPT

## 2017-08-29 RX ADMIN — HYDROCODONE BITARTRATE AND ACETAMINOPHEN 1 TABLET: 7.5; 325 TABLET ORAL at 00:59

## 2017-08-29 RX ADMIN — HYDROCODONE BITARTRATE AND ACETAMINOPHEN 1 TABLET: 7.5; 325 TABLET ORAL at 13:24

## 2017-08-29 RX ADMIN — FLUOXETINE 20 MG: 20 CAPSULE ORAL at 08:11

## 2017-08-29 RX ADMIN — HYDROCODONE BITARTRATE AND ACETAMINOPHEN 1 TABLET: 7.5; 325 TABLET ORAL at 21:31

## 2017-08-29 RX ADMIN — DOCUSATE SODIUM AND SENNOSIDES 2 TABLET: 8.6; 5 TABLET, FILM COATED ORAL at 17:34

## 2017-08-29 RX ADMIN — ATORVASTATIN CALCIUM 10 MG: 10 TABLET, FILM COATED ORAL at 08:12

## 2017-08-29 RX ADMIN — HYDROCODONE BITARTRATE AND ACETAMINOPHEN 1 TABLET: 7.5; 325 TABLET ORAL at 07:52

## 2017-08-29 RX ADMIN — HYDROCODONE BITARTRATE AND ACETAMINOPHEN 1 TABLET: 7.5; 325 TABLET ORAL at 17:35

## 2017-08-29 RX ADMIN — Medication 1 TABLET: at 08:12

## 2017-08-29 RX ADMIN — DOCUSATE SODIUM AND SENNOSIDES 2 TABLET: 8.6; 5 TABLET, FILM COATED ORAL at 08:11

## 2017-08-30 LAB
INR PPP: 4.57 (ref 0.9–1.1)
PROTHROMBIN TIME: 44.5 SECONDS (ref 12.1–15)

## 2017-08-30 PROCEDURE — 97116 GAIT TRAINING THERAPY: CPT

## 2017-08-30 PROCEDURE — 85610 PROTHROMBIN TIME: CPT | Performed by: FAMILY MEDICINE

## 2017-08-30 PROCEDURE — 97110 THERAPEUTIC EXERCISES: CPT

## 2017-08-30 PROCEDURE — 97535 SELF CARE MNGMENT TRAINING: CPT

## 2017-08-30 RX ADMIN — HYDROCODONE BITARTRATE AND ACETAMINOPHEN 1 TABLET: 7.5; 325 TABLET ORAL at 06:19

## 2017-08-30 RX ADMIN — DOCUSATE SODIUM AND SENNOSIDES 2 TABLET: 8.6; 5 TABLET, FILM COATED ORAL at 17:45

## 2017-08-30 RX ADMIN — ATORVASTATIN CALCIUM 10 MG: 10 TABLET, FILM COATED ORAL at 10:02

## 2017-08-30 RX ADMIN — HYDROCODONE BITARTRATE AND ACETAMINOPHEN 1 TABLET: 7.5; 325 TABLET ORAL at 21:56

## 2017-08-30 RX ADMIN — DOCUSATE SODIUM AND SENNOSIDES 2 TABLET: 8.6; 5 TABLET, FILM COATED ORAL at 10:01

## 2017-08-30 RX ADMIN — FLUOXETINE 20 MG: 20 CAPSULE ORAL at 10:01

## 2017-08-30 RX ADMIN — HYDROCODONE BITARTRATE AND ACETAMINOPHEN 1 TABLET: 7.5; 325 TABLET ORAL at 14:23

## 2017-08-30 RX ADMIN — Medication 1 TABLET: at 10:01

## 2017-08-31 LAB
INR PPP: 3.74 (ref 0.9–1.1)
PROTHROMBIN TIME: 37.9 SECONDS (ref 12.1–15)

## 2017-08-31 PROCEDURE — 97116 GAIT TRAINING THERAPY: CPT

## 2017-08-31 PROCEDURE — 85610 PROTHROMBIN TIME: CPT | Performed by: FAMILY MEDICINE

## 2017-08-31 PROCEDURE — 97535 SELF CARE MNGMENT TRAINING: CPT

## 2017-08-31 PROCEDURE — 97110 THERAPEUTIC EXERCISES: CPT

## 2017-08-31 RX ADMIN — HYDROCODONE BITARTRATE AND ACETAMINOPHEN 1 TABLET: 7.5; 325 TABLET ORAL at 18:31

## 2017-08-31 RX ADMIN — Medication 1 TABLET: at 08:22

## 2017-08-31 RX ADMIN — FLUOXETINE 20 MG: 20 CAPSULE ORAL at 08:22

## 2017-08-31 RX ADMIN — ATORVASTATIN CALCIUM 10 MG: 10 TABLET, FILM COATED ORAL at 08:22

## 2017-08-31 RX ADMIN — HYDROCODONE BITARTRATE AND ACETAMINOPHEN 1 TABLET: 7.5; 325 TABLET ORAL at 08:22

## 2017-08-31 RX ADMIN — HYDROCODONE BITARTRATE AND ACETAMINOPHEN 1 TABLET: 7.5; 325 TABLET ORAL at 22:32

## 2017-08-31 RX ADMIN — DOCUSATE SODIUM AND SENNOSIDES 2 TABLET: 8.6; 5 TABLET, FILM COATED ORAL at 08:22

## 2017-09-01 LAB
INR PPP: 2.93 (ref 0.9–1.1)
PROTHROMBIN TIME: 31.2 SECONDS (ref 12.1–15)

## 2017-09-01 PROCEDURE — 85610 PROTHROMBIN TIME: CPT | Performed by: FAMILY MEDICINE

## 2017-09-01 PROCEDURE — 97110 THERAPEUTIC EXERCISES: CPT

## 2017-09-01 PROCEDURE — 97116 GAIT TRAINING THERAPY: CPT

## 2017-09-01 RX ADMIN — Medication 1 TABLET: at 08:50

## 2017-09-01 RX ADMIN — HYDROCODONE BITARTRATE AND ACETAMINOPHEN 1 TABLET: 7.5; 325 TABLET ORAL at 17:41

## 2017-09-01 RX ADMIN — FLUOXETINE 20 MG: 20 CAPSULE ORAL at 08:50

## 2017-09-01 RX ADMIN — HYDROCODONE BITARTRATE AND ACETAMINOPHEN 1 TABLET: 7.5; 325 TABLET ORAL at 06:29

## 2017-09-01 RX ADMIN — DOCUSATE SODIUM AND SENNOSIDES 2 TABLET: 8.6; 5 TABLET, FILM COATED ORAL at 08:50

## 2017-09-01 RX ADMIN — HYDROCODONE BITARTRATE AND ACETAMINOPHEN 1 TABLET: 7.5; 325 TABLET ORAL at 22:59

## 2017-09-01 RX ADMIN — ATORVASTATIN CALCIUM 10 MG: 10 TABLET, FILM COATED ORAL at 08:50

## 2017-09-01 NOTE — PLAN OF CARE
Problem: Patient Care Overview (Adult)  Goal: Plan of Care Review  Outcome: Ongoing (interventions implemented as appropriate)    09/01/17 1256   Coping/Psychosocial Response Interventions   Plan Of Care Reviewed With patient   Outcome Evaluation   Outcome Summary/Follow up Plan PT: Patient requires verbal cues for safety with rollator during gait. Patient able to perform gait x400 feet with 1 standing rest break due to fatigue. Patient ascends/descends 5 steps x2, initially with min assist however improved to CGA on 2nd trial. Patient will benefit from continued PT services. Plan to see once daily x3 days prior to discharge.

## 2017-09-01 NOTE — THERAPY TREATMENT NOTE
"SNF - Physical Therapy Progress Note   [place]     Patient Name: Gaby Diaz  : 2/10/1931  MRN: [id number]  Today's Date: 2017  Onset of Illness/Injury or Date of Surgery Date: 17  Date of Referral to PT: 17  Referring Physician: Liane    Admit Date: 2017    P.T. Weekly Note    Subjective:\"I am doing better today\"    Objective: Patient has been seen by physical therapy once to twice daily to address deficits in functional mobility, strength and activity tolerance.  Patient currently requires min assist for sit to supine transfer with increased assistance required for LE into bed.  Patient requires SBA for sit to stand transfer with occasional verbal cues for proper hand placement and sequencing.  Patient able to perform gait with rollator x400 feet with stand by assistance with occasional verbal cues for proper distance from rollator.  Patient able to ascend/descend 5 steps with 1 handrail, CGA/min assist.  Patient improves stair training upon subsequent trial after verbal cues and demonstration.  Patient able to perform LE exercise program without assistance from therapist.    Assessment:Patient has made progress in all areas of functional mobility and continues to improve overall mobility.  Patient continues to demonstrate deficits with sit to supine transfers and demonstrates inconsistencies with stair training assistance.  Patient will benefit from additional physical therapy to address above mentioned deficits and ensure consistency in mobility prior to discharge.    Plan:Plan to see once daily x3 additional visits.  Recommend home with home health PT at discharge.                                        Terese Edmond, PT  2017         "

## 2017-09-01 NOTE — PLAN OF CARE
Problem: Patient Care Overview (Adult)  Goal: Plan of Care Review    09/01/17 1041   Coping/Psychosocial Response Interventions   Plan Of Care Reviewed With patient   Outcome Evaluation   Outcome Summary/Follow up Plan OT: pt sba for tb adls with use of long handled ae from chair levle. pt I with adaptive equipment managment. pt sba for functional transfers with rollator and extended time. pt met all OT goals. pt being followed by PT through next week. pt would benefit from home health safety evaluation when discharged from facility.         Problem: Inpatient Occupational Therapy  Goal: Transfer Training Goal 1 STG- OT  Outcome: Outcome(s) achieved Date Met:  09/01/17 08/26/17 1102 09/01/17 1041   Transfer Training OT STG   Transfer Training OT STG, Date Established 08/26/17 --    Transfer Training OT STG, Time to Achieve 1 wk --    Transfer Training OT STG, Activity Type toilet --    Transfer Training OT STG, [place] Level supervision required --    Transfer Training OT STG, Assist Device walker, rolling --    Transfer Training OT STG, Date Goal Reviewed --  09/01/17   Transfer Training OT STG, Outcome --  goal met       Goal: Patient Education Goal STG- OT  Outcome: Outcome(s) achieved Date Met:  09/01/17 08/26/17 1102 09/01/17 1041   Patient Education OT STG   Patient Education OT STG, Date Established 08/26/17 --    Patient Education OT STG, Time to Achieve 1 wk --    Patient Education OT STG, Education Type adaptive equipment mgmt --    Patient Education OT STG, Education Understanding demonstrates adequately --    Patient Education OT STG, Date Goal Reviewed --  09/01/17   Patient Education OT STG Outcome --  goal met       Goal: Bathing Goal STG- OT  Outcome: Outcome(s) achieved Date Met:  09/01/17 08/26/17 1102 09/01/17 1041   Bathing OT STG   Bathing Goal OT STG, Date Established 08/26/17 --    Bathing Goal OT STG, Time to Achieve 1 wk --    Bathing Goal OT STG, Activity Type lower body  bathing --    Bathing Goal OT STG, [place] Level supervision required --    Bathing Goal OT STG, Assist Device sponge, long handled --    Bathing Goal OT STG, Date Goal Reviewed --  09/01/17   Bathing Goal OT STG, Outcome --  goal met       Goal: LB Dressing Goal STG- OT  Outcome: Outcome(s) achieved Date Met:  09/01/17 08/26/17 1102 09/01/17 1041   LB Dressing OT STG   LB Dressing Goal OT STG, Date Established 08/26/17 --    LB Dressing Goal OT STG, Time to Achieve 1 wk --    LB Dressing Goal OT STG, [place] Level supervision required --    LB Dressing Goal OT STG, Adaptive Equipment reacher;shoe horn, long handled;sock-aid --    LB Dressing Goal OT STG, Date Goal Reviewed --  09/01/17   LB Dressing Goal OT STG, Outcome --  goal met

## 2017-09-01 NOTE — PLAN OF CARE
Problem: Inpatient Physical Therapy  Goal: Bed Mobility Goal STG- PT  Outcome: Ongoing (interventions implemented as appropriate)    08/26/17 1025 09/01/17 1301   Bed Mobility PT STG   Bed Mobility PT STG, Date Established 08/26/17 --    Bed Mobility PT STG, Time to Achieve 1 wk --    Bed Mobility PT STG, Activity Type all bed mobility --    Bed Mobility PT STG, [place] Level supervision required --    Bed Mobility PT STG, Date Goal Reviewed --  09/01/17   Bed Mobility PT STG, Outcome --  goal partially met       Goal: Transfer Training Goal 1 STG- PT  Outcome: Ongoing (interventions implemented as appropriate)    08/26/17 1025 09/01/17 1301   Transfer Training PT STG   Transfer Training PT STG, Date Established 08/26/17 --    Transfer Training PT STG, Time to Achieve 1 wk --    Transfer Training PT STG, Activity Type all transfers --    Transfer Training PT STG, [place] Level supervision required --    Transfer Training PT STG, Assist Device (with appropriate device) --    Transfer Training PT STG, Date Goal Reviewed --  09/01/17   Transfer Training PT STG, Outcome --  goal met       Goal: Gait Training Goal STG- PT  Outcome: Outcome(s) achieved Date Met:  09/01/17 08/26/17 1025 09/01/17 1301   Gait Training PT STG   Gait Training Goal PT STG, Date Established 08/26/17 --    Gait Training Goal PT STG, Time to Achieve 1 wk --    Gait Training Goal PT STG, [place] Level supervision required --    Gait Training Goal PT STG, Assist Device (with appropriate device) --    Gait Training Goal PT STG, Distance to Achieve 200 --    Gait Training Goal PT STG, Date Goal Reviewed --  09/01/17   Gait Training Goal PT STG, Outcome --  goal met       Goal: Stair Training Goal STG- PT  Outcome: Ongoing (interventions implemented as appropriate)    08/26/17 1025 09/01/17 1301   Stair Training PT STG   Stair Training Goal PT STG, Date Established 08/26/17 --    Stair Training Goal PT STG, Time to Achieve 1 wk --     Stair Training Goal PT STG, Number of Steps 3 --    Stair Training Goal PT STG, [place] Level contact guard assist --    Stair Training Goal PT STG, Assist Device 1 handrail --    Stair Training Goal PT STG, Date Goal Reviewed --  09/01/17   Stair Training Goal PT STG, Outcome --  goal ongoing       Goal: Patient Education Goal STG- PT  Outcome: Outcome(s) achieved Date Met:  09/01/17 08/26/17 1025 09/01/17 1301   Patient Education PT STG   Patient Education PT STG, Date Established 08/26/17 --    Patient Education PT STG, Time to Achieve 1 wk --    Patient Education PT STG, Education Type written program;HEP --    Patient Education PT STG, Education Understanding demonstrate adequately;verbalize understanding --    Patient Education PT STG, Date Goal Reviewed --  09/01/17   Patient Education PT STG Outcome --  goal met

## 2017-09-01 NOTE — THERAPY DISCHARGE NOTE
SNF - Occupational Therapy Treatment Note/Discharge   Goldie Daniels     Patient Name: Gaby Diaz  : 2/10/1931  MRN: [id number]  Today's Date: 2017  Onset of Illness/Injury or Date of Surgery Date: 17  Date of Referral to OT: 17  Referring Physician: Liane      Admit Date: 2017    Visit Dx:   No diagnosis found.  Patient Active Problem List   Diagnosis   • Atrial fibrillation   • Hyperlipidemia   • Hypertension   • Encounter for rehabilitation             Adult Rehabilitation Note       17 1340 17 1326 17 0942    Rehab Assessment/Intervention    Discipline physical therapist  -JW occupational therapist  -JJ physical therapy assistant  -KM    Document Type therapy note (daily note)  - therapy note (daily note)  -J therapy note (daily note)  -    Subjective Information agree to therapy;no complaints  - agree to therapy  -J agree to therapy  -KM    Patient Effort, Rehab Treatment good  - good  -J good  -KM    Symptoms Noted Comment   pt reports fatigue at end of session  -KM    Precautions/Limitations fall precautions  - fall precautions  - fall precautions  -KM    Recorded by [JW] Terese Edmond, PT [JJ] Jenni Zaldivar, OTR [KM] Karyn Azul PTA    Pain Assessment    Pain Assessment No/denies pain  - No/denies pain  - No/denies pain  -    Pain Score   --   states she took pain med prior  -KM    Recorded by [JW] Terese Edmond, PT [JJ] Jenni Zaldivar, OTR [KM] Karyn Azul PTA    Bed Mobility, Assessment/Treatment    Bed Mob, Sit to Supine, [place] minimum assist (75% patient effort);verbal cues required  -      Bed Mobility, Comment verbal cues and demonstration required for optimal technique to return to supine  -JW deferred up in chair  -JJ pt up in chair  -KM    Recorded by [JW] Terese Edmond, PT [JJ] Jenni Zaldivar, OTR [KM] Karyn Azul PTA    Transfer Assessment/Treatment    Transfers, Sit-Stand  [place] supervision required  - stand by assist  - stand by assist;verbal cues required  -    Transfers, Stand-Sit [place] supervision required  - stand by assist  - stand by assist;verbal cues required  -    Transfers, Sit-Stand-Sit, Assist Device other (see comments)   rollator  - --   rollator  - --   rollator  -    Toilet Transfer, [place] supervision required;verbal cues required  -      Toilet Transfer, Assistive Device bedside commode without drop arms;rolling walker  -      Transfer, Comment requires increased time for sit to stand transfers from recliner surface  -JW pt requires extended time for sit to stand transfers from recliner chair  - continues to require cues for hand placement.  Practiced sit-stand to sit from various chairs- pt needs cues to scoot out to edge of chair prior to standing.  IF she attempts sit-stand without scooting out- she has difficulty reaching full stance.  -KM    Recorded by [] Terese Edmond, PT [JJ] Jenni Zaldivar, OTR [] Karyn Azul PTA    Gait Assessment/Treatment    Gait, [place] Level stand by assist;verbal cues required  -  stand by assist;contact guard assist;verbal cues required  -    Gait, Assistive Device rollator  -  rollator  -    Gait, Distance (Feet) --   350 with 1 seated rest break  -  --   400 total with 2 rest breaks  -    Gait, Gait Pattern Analysis swing-through gait  -      Gait, Gait Deviations forward flexed posture;step length decreased;narrow base  -  forward flexed posture  -    Gait, Safety Issues step length decreased;balance decreased during turns  -      Gait, Comment pt requires cues for avoidance of external obstacles with rollator.  Patient requires 1 seated rest break during gait training.  Patient requires cues for upright posture.  -  continues to require cues for upright posture and to stay up with rollator.  Had pt ambulate outside on sidewalk.  At times she  would move rollator too far away from her trying to avoid a stick, rock, etc.  Cues to keep rollator with her.  Cues to slow down with going down ramp but did fine with going up slight ramp  -KM    Recorded by [JW] Terese Edmond, PT  [KM] Karyn Azul PTA    Functional Mobility    Functional Mobility- Ind. Level  --   SBA  -     Functional Mobility- Device  rollator  -     Functional Mobility-Distance (Feet)  --   to bathroom  -     Recorded by  [JISABELL] EDIN Gaston     Balance Skills Training    Standing-Level of Assistance  Close supervision  -     Static Standing Balance Support  assistive device  -     Standing-Balance Activities  --   washing hands at sink  -     Standing Balance # of Minutes  --   3 minutes  -     Recorded by  [KAYLIN] EDIN Gaston     Therapy Exercises    Bilateral Lower Extremities   20 reps;ankle pumps/circles;hip abduction/adduction;LAQ;quad sets   marching  -KM    Recorded by   [KM] Karyn Azul PTA    Positioning and Restraints    Pre-Treatment Position sitting in chair/recliner  - sitting in chair/recliner  - sitting in chair/recliner  -KM    Post Treatment Position bed  - chair  - chair  -    In Bed supine;call light within reach;encouraged to call for assist;with family/caregiver  -      In Chair  --   ambulating in room with PT  -KAYLIN --   in activities per pt request  -KM    Recorded by [JW] Terese Edmond, PT [JJ] Jenni Zaldivar, JONATANR [KM] Karyn Azul PTA      08/30/17 1047 08/30/17 0950 08/29/17 1403    Rehab Assessment/Intervention    Discipline physical therapist  -LYNNE occupational therapist  -EN physical therapy assistant  -KM    Document Type therapy note (daily note)  -LYNNE therapy note (daily note)  -EN therapy note (daily note)  -KM    Subjective Information agree to therapy;complains of;fatigue  -LYNNE agree to therapy  -EN agree to therapy;complains of;fatigue  -KM    Patient Effort, Rehab Treatment good  -LYNNE good  -EN  good  -KM    Treatment Not Performed, Comment   checked earlier- pt with OT  -KM    Precautions/Limitations fall precautions  -JW fall precautions  -EN fall precautions  -KM    Recorded by [JW] Terese Edmond, PT [EN] Delia Maxwell, OTR [KM] Karyn Azul PTA    Pain Assessment    Pain Assessment No/denies pain  -JW 0-10  -EN 0-10  -KM    Pain Score  --   2-3  -EN --   reports its not bad- did not give number  -KM    Pain Type  Acute pain;Surgical pain  -EN Acute pain;Surgical pain  -KM    Pain Location  Hip  -EN Hip  -KM    Pain Orientation  Right  -EN Right  -KM    Pain Intervention(s)  Medication (See MAR);Repositioned  -EN Medication (See MAR);Repositioned  -KM    Response to Interventions  tolerated  -EN tolerated  -KM    Recorded by [JW] Terese Edmond, PT [EN] Delia Maxwell, OTR [KM] Karyn Azul PTA    Cognitive Assessment/Intervention    Personal Safety Interventions  gait belt;nonskid shoes/slippers when out of bed  -EN gait belt;nonskid shoes/slippers when out of bed  -KM    Recorded by  [EN] Delia Maxwell, OTR [KM] Karyn Azul PTA    Bed Mobility, Assessment/Treatment    Bed Mob, Sit to Supine, [place] minimum assist (75% patient effort);verbal cues required   assist for right LE into bed  -JW  minimum assist (75% patient effort)   assist for RLE into bed  -KM    Bed Mobility, Comment   pt reports she gets in and out of bed at home on her left side of the bed- Pt has difficulty lifting LLE into bed- requires assist for this but then is able to finish transfer into bed  -KM    Recorded by [JW] Terese Edmond, PT  [KM] Karyn Azul PTA    Transfer Assessment/Treatment    Transfers, Sit-Stand [place] stand by assist;verbal cues required  -JW stand by assist  -EN contact guard assist;verbal cues required  -KM    Transfers, Stand-Sit [place] stand by assist;verbal cues required  -JW stand by assist  -EN verbal cues required;contact guard assist  -KM    Transfers,  Sit-Stand-Sit, Assist Device other (see comments)   rollator  - other (see comments)   rollator  -EN --   rollator  -KM    Transfer, Comment pt requires verbal cues for proper hand placement  -  pt did better with locking wheels of rollator and reaching back for chair  -KM    Recorded by [JW] Terese Edmond, PT [EN] Delia Maxwell, OTR [KM] Karyn Azul PTA    Gait Assessment/Treatment    Gait, [place] Level stand by assist;contact guard assist  -  contact guard assist;verbal cues required;stand by assist  -    Gait, Assistive Device rollator  -  rollator  -    Gait, Distance (Feet) --   2x200 feet with 1 seated rest break  -  110   seated rest then 102 ft  -    Gait, Gait Pattern Analysis swing-through gait  -      Gait, Gait Deviations misael decreased;forward flexed posture;step length decreased;narrow base  -  forward flexed posture  -    Gait, Safety Issues step length decreased;balance decreased during turns  -      Gait, Comment pt requires cues for upright posture and safety with rollator.  pt requires cues for avoidance of external obstacles  -  pt requires cues for upright posture with gait- and cues to stay up with walker. Limited distance today due to fatigue  -KM    Recorded by [JW] Terese Edmond, PT  [KM] Karyn Azul PTA    Stairs Assessment/Treatment    Number of Stairs 3  -      Stairs, Handrail Location right side (ascending)  -      Stairs, [place] Level contact guard assist  -      Stairs, Technique Used step to step (ascending);step to step (descending)  -      Stairs, Comment attempted 3 steps x2  -  pt reports she has three steps with one rail to enter house. States she does not  use stairs inside house  -KM    Recorded by [JW] Terese Edmond, PT  [KM] Karyn Azul, PTA    Functional Mobility    Functional Mobility- Ind. Level  --   SBA  -EN     Functional Mobility- Device  rollator  -EN     Functional Mobility-Distance (Feet)  200  -EN      Functional Mobility- Comment  Patient performed kitchen mobility with SBA.  Opened fridge, microwave, etc.. with no LOB.   Patient walked 200 feet from room to gym with one rest break, then walked 200 feet back to room without a rest break.  -EN     Recorded by  [EN] EDIN Maria     Therapy Exercises    Bilateral Lower Extremities --   reviewed LE HEP  -  20 reps;ankle pumps/circles;hip abduction/adduction;LAQ;quad sets  -KM    Recorded by [JW] Terese Edmond, PT  [KM] Karyn Azul, CHASITY    Positioning and Restraints    Pre-Treatment Position sitting in chair/recliner  -JW sitting in chair/recliner  -EN sitting in chair/recliner  -KM    Post Treatment Position bed  -JW chair  -EN bed  -KM    In Bed supine;call light within reach;encouraged to call for assist  -JW  supine;notified nsg;call light within reach;encouraged to call for assist;side rails up x2  -KM    In Chair  reclined;call light within reach;encouraged to call for assist  -EN     Recorded by [JW] Terese Edmond, PT [EN] Delia Maxwell OTR [KM] Karyn Azul, PTA      08/29/17 1323          Rehab Assessment/Intervention    Discipline occupational therapist  -EN      Document Type therapy note (daily note)  -EN      Subjective Information agree to therapy  -EN      Patient Effort, Rehab Treatment good  -EN      Precautions/Limitations fall precautions  -EN      Recorded by [EN] EDIN Maria      Pain Assessment    Pain Assessment 0-10  -EN      Pain Score 3  -EN      Post Pain Score 4  -EN      Pain Type Acute pain;Surgical pain  -EN      Pain Location Hip  -EN      Pain Orientation Right  -EN      Pain Intervention(s) Medication (See MAR);Repositioned  -EN      Response to Interventions tolerated  -EN      Recorded by [EN] EDIN Maria      Cognitive Assessment/Intervention    Personal Safety Interventions gait belt;nonskid shoes/slippers when out of bed  -EN      Recorded by [EN] Delia Maxwell OTR       Transfer Assessment/Treatment    Transfers, Sit-Stand [place] contact guard assist  -EN      Transfers, Stand-Sit [place] verbal cues required;contact guard assist  -EN      Transfers, Sit-Stand-Sit, Assist Device --   rollator  -EN      Toilet Transfer, [place] contact guard assist;verbal cues required  -EN      Toilet Transfer, Assistive Device bedside commode without drop arms   rollator  -EN      Transfer, Comment cues for hand placement  -EN      Recorded by [EN] Delia Maxwell OTR      Functional Mobility    Functional Mobility- Ind. Level contact guard assist;verbal cues required  -EN      Functional Mobility- Device rollator  -EN      Recorded by [EN] Delia Maxwell, OTR      Upper Body Bathing Assessment/Training    UB Bathing Assess/Train, [place] Level independent   at sink side  -EN      Recorded by [EN] Delia Maxwell, OTR      Lower Body Bathing Assessment/Training    LB Bathing Assess/Train Assistive Device long-handled sponge  -EN      LB Bathing Assess/Train, Position sitting;standing  -EN      LB Bathing Assess/Train, [place] Level contact guard assist;verbal cues required  -EN      Recorded by [EN] Delia Maxwell, JONATANR      Upper Body Dressing Assessment/Training    UB Dressing Assess/Train, Clothing Type doffing:;donning:;pull over  -EN      UB Dressing Assess/Train, [place] independent  -EN      Recorded by [EN] Delia Maxwell, OTR      Lower Body Dressing Assessment/Training    LB Dressing Assess/Train, Clothing Type doffing:;donning:;pants;socks  -EN      LB Dressing Assess/Train, Assist Device reacher;sock-aid  -EN      LB Dressing Assess/Train, Position sitting;standing  -EN      LB Dressing Assess/Train, Comment Doffed lower body clothing with CGA/with reacher.  Donned pants with min A for utilzing reacher for right pant leg, stood and pulled pants over hips with CGA.  Donned socks with sock aid with verbal cues only.  -EN       Recorded by [EN] Delia Maxwell, OTR      Grooming Assessment/Training    Grooming Assess/Train, [place]ep[place] Level independent  -EN      Recorded by [EN] Delia Maxwell, OTR      Balance Skills Training    Standing-Level of Assistance Contact guard;Close supervision  -EN      Static Standing Balance Support assistive device  -EN      Standing Balance # of Minutes 5  -EN      Recorded by [EN] Delia Maxwell, OTR      Positioning and Restraints    Pre-Treatment Position sitting in chair/recliner  -EN      Post Treatment Position chair  -EN      In Chair reclined;call light within reach;encouraged to call for assist  -EN      Recorded by [EN] Delia Maxwell, OTR        User Key  (r) = Recorded By, (t) = Taken By, (c) = Cosigned By    Initials Name Effective Dates    KM Karyn LILI Azul, PTA 08/11/15 -     EN Delia Maxwell, OTR 06/22/16 -     JJ Jenni Zaldivar, OTR 06/22/16 -     LYNNE Edmond, PT 12/01/15 -                 OT Goals       09/01/17 1041 08/26/17 1102       Transfer Training OT STG    Transfer Training OT STG, Date Established  08/26/17  -     Transfer Training OT STG, Time to Achieve  1 wk  -     Transfer Training OT STG, Activity Type  toilet  -     Transfer Training OT STG, [place] Level  supervision required  -     Transfer Training OT STG, Assist Device  walker, rolling  -     Transfer Training OT STG, Date Goal Reviewed 09/01/17  -      Transfer Training OT STG, Outcome goal met  -      Patient Education OT STG    Patient Education OT STG, Date Established  08/26/17  -     Patient Education OT STG, Time to Achieve  1 wk  -     Patient Education OT STG, Education Type  adaptive equipment mgmt  -     Patient Education OT STG, Education Understanding  demonstrates adequately  -     Patient Education OT STG, Date Goal Reviewed 09/01/17  -      Patient Education OT STG Outcome goal met  -      Bathing OT STG    Bathing Goal OT STG, Date  Established  08/26/17  -     Bathing Goal OT STG, Time to Achieve  1 wk  -     Bathing Goal OT STG, Activity Type  lower body bathing  -     Bathing Goal OT STG, Likely Level  supervision required  -     Bathing Goal OT STG, Assist Device  sponge, long handled  -     Bathing Goal OT STG, Date Goal Reviewed 09/01/17  -      Bathing Goal OT STG, Outcome goal met  -      LB Dressing OT STG    LB Dressing Goal OT STG, Date Established  08/26/17  -     LB Dressing Goal OT STG, Time to Achieve  1 wk  -     LB Dressing Goal OT STG, Likely Level  supervision required  -     LB Dressing Goal OT STG, Adaptive Equipment  reacher;shoe horn, long handled;sock-aid  -     LB Dressing Goal OT STG, Date Goal Reviewed 09/01/17  -      LB Dressing Goal OT STG, Outcome goal met  -        User Key  (r) = Recorded By, (t) = Taken By, (c) = Cosigned By    Initials Name Provider Type     EDIN Gaston Occupational Therapist          Occupational Therapy Education     Title: PT OT SLP Therapies (Done)     Topic: Occupational Therapy (Resolved)     Point: ADL training (Resolved)    Description: Instruct learner(s) on proper safety adaptation and remediation techniques during self care or transfers.   Instruct in proper use of assistive devices.    Learning Progress Summary    Learner Readiness Method Response Comment Documented by Status   Patient Acceptance E VU pt educated on adls, long handled ae, home safety and safety with functional transfers and mobility JJ 09/01/17 1039 Done    Acceptance E VU pt educated on home safety, safety with functional transfers and mobility, and safety with functional activites with long handled ae JJ 08/31/17 1446 Done    Acceptance E VU Patient educated on safety during functional mobility in the kitchen. EN 08/30/17 0950 Done    Acceptance E VU Patient educated on adaptive equipment and safety with functional transfers. EN 08/29/17 1323 Done     Acceptance E VU Patient educated on adaptive equipment and safety with functional transfers. EN 08/28/17 0936 Done    Acceptance E VU pt educated on adls, long handled ae,  safety with functional transfers and mobility J 08/26/17 1057 Done               Point: Precautions (Resolved)    Description: Instruct learner(s) on prescribed precautions during self-care and functional transfers.    Learning Progress Summary    Learner Readiness Method Response Comment Documented by Status   Patient Acceptance E VU pt educated on adls, long handled ae, home safety and safety with functional transfers and mobility J 09/01/17 1039 Done    Acceptance E VU pt educated on home safety, safety with functional transfers and mobility, and safety with functional activites with long handled ae J 08/31/17 1446 Done    Acceptance E VU pt educated on adls, long handled ae,  safety with functional transfers and mobility  08/26/17 1057 Done               Point: Body mechanics (Resolved)    Description: Instruct learner(s) on proper positioning and spine alignment during self-care, functional mobility activities and/or exercises.    Learning Progress Summary    Learner Readiness Method Response Comment Documented by Status   Patient Acceptance E VU pt educated on adls, long handled ae, home safety and safety with functional transfers and mobility J 09/01/17 1039 Done    Acceptance E VU pt educated on home safety, safety with functional transfers and mobility, and safety with functional activites with long handled ae J 08/31/17 1446 Done    Acceptance E VU pt educated on adls, long handled ae,  safety with functional transfers and mobility  08/26/17 1057 Done                      User Key     Initials Effective Dates Name Provider Type Discipline    EN 06/22/16 -  Delia Maxwell, OTR Occupational Therapist OT    J 06/22/16 -  Jenni Zaldivar, OTR Occupational Therapist OT                OT Recommendation and  Plan  Anticipated Discharge Disposition: home with home health  Planned Therapy Interventions: ADL retraining, adaptive equipment training, balance training, transfer training  Therapy Frequency: 5 times/wk  Plan of Care Review  Plan Of Care Reviewed With: patient  Outcome Summary/Follow up Plan: OT: pt sba for tb adls with use of long handled ae from chair levle. pt I with adaptive equipment managment. pt  sba for functional transfers with rollator and extended time. pt met all OT goals. pt  being followed by PT through next week. pt would benefit from home health safety evaluation when discharged from facility.         Time Calculation:        Therapy Charges for Today     Code Description Service Date Service Provider Modifiers Qty    [id number] HC OT SELF CARE/MGMT/TRAIN EA 15 MIN 8/31/2017 Jenni Zaldivar, OTR GO 1               OT Discharge Summary  Anticipated Discharge Disposition: home with home health  Reason for Discharge: All goals achieved  Outcomes Achieved: Able to achieve all goals within established timeline  Discharge Destination: Home with home health    EDIN Gaston  9/1/2017

## 2017-09-01 NOTE — THERAPY TREATMENT NOTE
SNF - Physical Therapy Treatment Note   [place]     Patient Name: Gaby Diaz  : 2/10/1931  MRN: [id number]  Today's Date: 2017  Onset of Illness/Injury or Date of Surgery Date: 17  Date of Referral to PT: 17  Referring Physician: Liane    Admit Date: 2017    Visit Dx:  No diagnosis found.  Patient Active Problem List   Diagnosis   • Atrial fibrillation   • Hyperlipidemia   • Hypertension   • Encounter for rehabilitation               Adult Rehabilitation Note       17 0943 17 1340 17 1326    Rehab Assessment/Intervention    Discipline physical therapist  -JW physical therapist  -JW occupational therapist  -JJ    Document Type therapy note (daily note)  -JW therapy note (daily note)  -JW therapy note (daily note)  -JJ    Subjective Information agree to therapy;no complaints  -JW agree to therapy;no complaints  -JW agree to therapy  -JJ    Patient Effort, Rehab Treatment good  - good  - good  -JJ    Precautions/Limitations fall precautions  -JW fall precautions  -JW fall precautions  -JJ    Recorded by [JW] Terese Edmond, PT [JW] Terese Edmond, PT [JJ] Jenni Zaldivar, OTR    Pain Assessment    Pain Assessment No/denies pain  -JW No/denies pain  -JW No/denies pain  -JJ    Recorded by [JW] Terese Edmond, PT [JW] Terese Edmond, PT [JJ] Jenni Zaldivar, OTR    Bed Mobility, Assessment/Treatment    Bed Mob, Sit to Supine, [place]  minimum assist (75% patient effort);verbal cues required  -     Bed Mobility, Comment deferred-up in chair  -JW verbal cues and demonstration required for optimal technique to return to supine  - deferred up in chair  -JJ    Recorded by [LYNNE] Terese Edmond, PT [JW] Terese Edmond, PT [JJ] Jenni Zaldivar, OTR    Transfer Assessment/Treatment    Transfers, Sit-Stand [place] supervision required;verbal cues required  - supervision required  - stand by assist  -JJ    Transfers, Stand-Sit  [place] supervision required;verbal cues required  - supervision required  - stand by assist  -    Transfers, Sit-Stand-Sit, Assist Device other (see comments)   rollator  -JW other (see comments)   rollator  -JW --   rollator  -    Toilet Transfer, [place]  supervision required;verbal cues required  -     Toilet Transfer, Assistive Device  bedside commode without drop arms;rolling walker  -JW     Transfer, Comment requires increased time to stand from recliner surface.  verbal cues for adequate scooting to edge of chair  -JW requires increased time for sit to stand transfers from recliner surface  -JW pt requires extended time for sit to stand transfers from recliner chair  -    Recorded by [JW] Terese Edmond PT [JW] Terese Edmond, PT [JJ] Jenni Zaldivar OTR    Gait Assessment/Treatment    Gait, [place] Level stand by assist;verbal cues required  - stand by assist;verbal cues required  -     Gait, Assistive Device rollator  - rollator  -     Gait, Distance (Feet) 400   1 standing rest break  - --   350 with 1 seated rest break  -     Gait, Gait Pattern Analysis swing-through gait  -JW swing-through gait  -     Gait, Gait Deviations forward flexed posture;misael decreased;step length decreased;narrow base  - forward flexed posture;step length decreased;narrow base  -     Gait, Safety Issues balance decreased during turns  - step length decreased;balance decreased during turns  -JW     Gait, Comment pt requires cues for proper distance from rollator  - pt requires cues for avoidance of external obstacles with rollator.  Patient requires 1 seated rest break during gait training.  Patient requires cues for upright posture.  -     Recorded by [JW] Terese Edmond, PT [JW] Terese Edmond, PT     Stairs Assessment/Treatment    Number of Stairs 5  -      Stairs, Handrail Location right side (ascending)  -      Stairs, [place] Level contact guard  assist;minimum assist (75% patient effort)  -      Stairs, Technique Used step to step (ascending);step to step (descending)  -      Stairs, Comment pt with 1 episode of not adquately clearing step, requires min assist to correct balance.  2 trials of 5 steps attempted with improved safety on 2nd attempt  -      Recorded by [JW] Terese Edmond, PT      Functional Mobility    Functional Mobility- Ind. Level   --   SBA  -    Functional Mobility- Device   rollator  -    Functional Mobility-Distance (Feet)   --   to bathroom  -    Recorded by   [JJ] Jenni Zaldivar, EDIN    Balance Skills Training    Standing-Level of Assistance   Close supervision  -    Static Standing Balance Support   assistive device  -    Standing-Balance Activities   --   washing hands at sink  -    Standing Balance # of Minutes   --   3 minutes  -    Recorded by   [JJ] EDIN Gaston    Positioning and Restraints    Pre-Treatment Position sitting in chair/recliner  - sitting in chair/recliner  - sitting in chair/recliner  -    Post Treatment Position chair  - bed  - chair  -    In Bed  supine;call light within reach;encouraged to call for assist;with family/caregiver  -     In Chair reclined;encouraged to call for assist;call light within reach  -  --   ambulating in room with PT  -    Recorded by [LYNNE] Terese Edmond, PT [JW] Terese Edmond, PT [JJ] Jenni Zaldivar, OTR      08/31/17 0942 08/30/17 1047 08/30/17 0950    Rehab Assessment/Intervention    Discipline physical therapy assistant  -TJ physical therapist  -LYNNE occupational therapist  -EN    Document Type therapy note (daily note)  -TJ therapy note (daily note)  -LYNNE therapy note (daily note)  -EN    Subjective Information agree to therapy  -TJ agree to therapy;complains of;fatigue  - agree to therapy  -EN    Patient Effort, Rehab Treatment good  -TJ good  -LYNNE good  -EN    Symptoms Noted Comment pt reports fatigue at end of  session  -KM      Precautions/Limitations fall precautions  -KM fall precautions  -JW fall precautions  -EN    Recorded by [KM] Karyn Azul PTA [JW] Terese Edmond, PT [EN] Delia Maxwell, OTR    Pain Assessment    Pain Assessment No/denies pain  - No/denies pain  - 0-10  -EN    Pain Score --   states she took pain med prior  -KM  --   2-3  -EN    Pain Type   Acute pain;Surgical pain  -EN    Pain Location   Hip  -EN    Pain Orientation   Right  -EN    Pain Intervention(s)   Medication (See MAR);Repositioned  -EN    Response to Interventions   tolerated  -EN    Recorded by [KM] Karyn Azul PTA [JW] Terese Edmond, PT [EN] Delia Maxwell, OTR    Cognitive Assessment/Intervention    Personal Safety Interventions   gait belt;nonskid shoes/slippers when out of bed  -EN    Recorded by   [EN] Delia Maxwell, OTR    Bed Mobility, Assessment/Treatment    Bed Mob, Sit to Supine, [place]  minimum assist (75% patient effort);verbal cues required   assist for right LE into bed  -JW     Bed Mobility, Comment pt up in chair  -KM      Recorded by [KM] Karyn Azul PTA [JW] Terese Edmond, PT     Transfer Assessment/Treatment    Transfers, Sit-Stand [place] stand by assist;verbal cues required  -KM stand by assist;verbal cues required  -JW stand by assist  -EN    Transfers, Stand-Sit [place] stand by assist;verbal cues required  -KM stand by assist;verbal cues required  -JW stand by assist  -EN    Transfers, Sit-Stand-Sit, Assist Device --   rollator  -KM other (see comments)   rollator  -JW other (see comments)   rollator  -EN    Transfer, Comment continues to require cues for hand placement.  Practiced sit-stand to sit from various chairs- pt needs cues to scoot out to edge of chair prior to standing.  IF she attempts sit-stand without scooting out- she has difficulty reaching full stance.  -KM pt requires verbal cues for proper hand placement  -JW     Recorded by [KM] Karyn Azul PTA  [JW] Terese Edmond, PT [EN] Delia Maxwell OTR    Gait Assessment/Treatment    Gait, [place] Level stand by assist;contact guard assist;verbal cues required  - stand by assist;contact guard assist  -     Gait, Assistive Device rollator  -KM rollator  -     Gait, Distance (Feet) --   400 total with 2 rest breaks  -KM --   2x200 feet with 1 seated rest break  -     Gait, Gait Pattern Analysis  swing-through gait  -     Gait, Gait Deviations forward flexed posture  - misael decreased;forward flexed posture;step length decreased;narrow base  -     Gait, Safety Issues  step length decreased;balance decreased during turns  -     Gait, Comment continues to require cues for upright posture and to stay up with rollator.  Had pt ambulate outside on sidewalk.  At times she would move rollator too far away from her trying to avoid a stick, rock, etc.  Cues to keep rollator with her.  Cues to slow down with going down ramp but did fine with going up slight ramp  - pt requires cues for upright posture and safety with rollator.  pt requires cues for avoidance of external obstacles  -     Recorded by [KM] Karyn Azul, PTA [JW] Terese Edmond, PT     Stairs Assessment/Treatment    Number of Stairs  3  -     Stairs, Handrail Location  right side (ascending)  -     Stairs, [place] Level  contact guard assist  -     Stairs, Technique Used  step to step (ascending);step to step (descending)  -     Stairs, Comment  attempted 3 steps x2  -JW     Recorded by  [JW] Terese Edmond, PT     Functional Mobility    Functional Mobility- Ind. Level   --   SBA  -EN    Functional Mobility- Device   rollator  -EN    Functional Mobility-Distance (Feet)   200  -EN    Functional Mobility- Comment   Patient performed kitchen mobility with SBA.  Opened fridge, microwave, etc.. with no LOB.   Patient walked 200 feet from room to gym with one rest break, then walked 200 feet back to room without a rest break.   -EN    Recorded by   [EN] Delia Maxwell, OTR    Therapy Exercises    Bilateral Lower Extremities 20 reps;ankle pumps/circles;hip abduction/adduction;LAQ;quad sets   marching  -KM --   reviewed LE HEP  -JW     Recorded by [KM] Karyn Azul PTA [JW] Terese Edmond, PT     Positioning and Restraints    Pre-Treatment Position sitting in chair/recliner  -KM sitting in chair/recliner  -JW sitting in chair/recliner  -EN    Post Treatment Position chair  -KM bed  -JW chair  -EN    In Bed  supine;call light within reach;encouraged to call for assist  -JW     In Chair --   in activities per pt request  -KM  reclined;call light within reach;encouraged to call for assist  -EN    Recorded by [KM] Karyn Azul PTA [JW] Terese Edmond, PT [EN] Delia Maxwell, OTR      08/29/17 1403 08/29/17 1323       Rehab Assessment/Intervention    Discipline physical therapy assistant  -KM occupational therapist  -EN     Document Type therapy note (daily note)  -KM therapy note (daily note)  -EN     Subjective Information agree to therapy;complains of;fatigue  -KM agree to therapy  -EN     Patient Effort, Rehab Treatment good  -KM good  -EN     Treatment Not Performed, Comment checked earlier- pt with OT  -KM      Precautions/Limitations fall precautions  -KM fall precautions  -EN     Recorded by [KM] Karyn Azul PTA [EN] Delia Maxwell, OTR     Pain Assessment    Pain Assessment 0-10  -KM 0-10  -EN     Pain Score --   reports its not bad- did not give number  -KM 3  -EN     Post Pain Score  4  -EN     Pain Type Acute pain;Surgical pain  -KM Acute pain;Surgical pain  -EN     Pain Location Hip  -KM Hip  -EN     Pain Orientation Right  -KM Right  -EN     Pain Intervention(s) Medication (See MAR);Repositioned  -KM Medication (See MAR);Repositioned  -EN     Response to Interventions tolerated  -KM tolerated  -EN     Recorded by [KM] Karyn Azul PTA [EN] Delia Maxwell, OTR     Cognitive Assessment/Intervention     Personal Safety Interventions gait belt;nonskid shoes/slippers when out of bed  -KM gait belt;nonskid shoes/slippers when out of bed  -EN     Recorded by [KM] Karyn Azul PTA [EN] EDIN Maria     Bed Mobility, Assessment/Treatment    Bed Mob, Sit to Supine, [place] minimum assist (75% patient effort)   assist for RLE into bed  -KM      Bed Mobility, Comment pt reports she gets in and out of bed at home on her left side of the bed- Pt has difficulty lifting LLE into bed- requires assist for this but then is able to finish transfer into bed  -KM      Recorded by [KM] Karyn Azul PTA      Transfer Assessment/Treatment    Transfers, Sit-Stand [place] contact guard assist;verbal cues required  -KM contact guard assist  -EN     Transfers, Stand-Sit [place] verbal cues required;contact guard assist  -KM verbal cues required;contact guard assist  -EN     Transfers, Sit-Stand-Sit, Assist Device --   rollator  -KM --   rollator  -EN     Toilet Transfer, [place]  contact guard assist;verbal cues required  -EN     Toilet Transfer, Assistive Device  bedside commode without drop arms   rollator  -EN     Transfer, Comment pt did better with locking wheels of rollator and reaching back for chair  -KM cues for hand placement  -EN     Recorded by [KM] Karyn Azul PTA [EN] EDIN Maria     Gait Assessment/Treatment    Gait, [place] Level contact guard assist;verbal cues required;stand by assist  -KM      Gait, Assistive Device rollator  -KM      Gait, Distance (Feet) 110   seated rest then 102 ft  -KM      Gait, Gait Deviations forward flexed posture  -KM      Gait, Comment pt requires cues for upright posture with gait- and cues to stay up with walker. Limited distance today due to fatigue  -KM      Recorded by [KM] Karyn Azul PTA      Stairs Assessment/Treatment    Stairs, Comment pt reports she has three steps with one rail to enter house. States she does not  use  stairs inside house  -KM      Recorded by [KM] Karyn Azul, PTA      Functional Mobility    Functional Mobility- Ind. Level  contact guard assist;verbal cues required  -EN     Functional Mobility- Device  rollator  -EN     Recorded by  [EN] Delia Maxwell OTR     Upper Body Bathing Assessment/Training    UB Bathing Assess/Train, [place] Level  independent   at sink side  -EN     Recorded by  [EN] Delia Maxwell OTR     Lower Body Bathing Assessment/Training    LB Bathing Assess/Train Assistive Device  long-handled sponge  -EN     LB Bathing Assess/Train, Position  sitting;standing  -EN     LB Bathing Assess/Train, [place] Level  contact guard assist;verbal cues required  -EN     Recorded by  [EN] Delia Maxwell, OTR     Upper Body Dressing Assessment/Training    UB Dressing Assess/Train, Clothing Type  doffing:;donning:;pull over  -EN     UB Dressing Assess/Train, [place]  independent  -EN     Recorded by  [EN] Delia Maxwell OTR     Lower Body Dressing Assessment/Training    LB Dressing Assess/Train, Clothing Type  doffing:;donning:;pants;socks  -EN     LB Dressing Assess/Train, Assist Device  reacher;sock-aid  -EN     LB Dressing Assess/Train, Position  sitting;standing  -EN     LB Dressing Assess/Train, Comment  Doffed lower body clothing with CGA/with reacher.  Donned pants with min A for utilzing reacher for right pant leg, stood and pulled pants over hips with CGA.  Donned socks with sock aid with verbal cues only.  -EN     Recorded by  [EN] EDIN Maria     Grooming Assessment/Training    Grooming Assess/Train, [place] Level  independent  -EN     Recorded by  [EN] Delia Maxwell OTR     Balance Skills Training    Standing-Level of Assistance  Contact guard;Close supervision  -EN     Static Standing Balance Support  assistive device  -EN     Standing Balance # of Minutes  5  -EN     Recorded by  [EN] Delia Maxwell OTR     Therapy Exercises     Bilateral Lower Extremities 20 reps;ankle pumps/circles;hip abduction/adduction;LAQ;quad sets  -KM      Recorded by [KM] Karyn Azul PTA      Positioning and Restraints    Pre-Treatment Position sitting in chair/recliner  -KM sitting in chair/recliner  -EN     Post Treatment Position bed  -KM chair  -EN     In Bed supine;notified nsg;call light within reach;encouraged to call for assist;side rails up x2  -KM      In Chair  reclined;call light within reach;encouraged to call for assist  -EN     Recorded by [KM] Karyn Azul PTA [EN] Delia Maxwell, OTR       User Key  (r) = Recorded By, (t) = Taken By, (c) = Cosigned By    Initials Name Effective Dates     Karyn Azul PTA 08/11/15 -     EN Delia Maxwell, OTR 06/22/16 -     JJ Jenni Zaldivar, OTR 06/22/16 -     JW Terese Edmond, PT 12/01/15 -                 IP PT Goals       08/26/17 1025          Bed Mobility PT STG    Bed Mobility PT STG, Date Established 08/26/17  -JW      Bed Mobility PT STG, Time to Achieve 1 wk  -JW      Bed Mobility PT STG, Activity Type all bed mobility  -JW      Bed Mobility PT STG, [place] Level supervision required  -JW      Transfer Training PT STG    Transfer Training PT STG, Date Established 08/26/17  -JW      Transfer Training PT STG, Time to Achieve 1 wk  -JW      Transfer Training PT STG, Activity Type all transfers  -JW      Transfer Training PT STG, [place] Level supervision required  -JW      Transfer Training PT STG, Assist Device --   with appropriate device  -JW      Gait Training PT STG    Gait Training Goal PT STG, Date Established 08/26/17  -JW      Gait Training Goal PT STG, Time to Achieve 1 wk  -JW      Gait Training Goal PT STG, [place] Level supervision required  -JW      Gait Training Goal PT STG, Assist Device --   with appropriate device  -JW      Gait Training Goal PT STG, Distance to Achieve 200  -JW      Stair Training PT STG    Stair Training Goal PT STG, Date  Established 08/26/17  -      Stair Training Goal PT STG, Time to Achieve 1 wk  -JW      Stair Training Goal PT STG, Number of Steps 3  -JW      Stair Training Goal PT STG, [place] Level contact guard assist  -      Stair Training Goal PT STG, Assist Device 1 handrail  -      Patient Education PT STG    Patient Education PT STG, Date Established 08/26/17  -JW      Patient Education PT STG, Time to Achieve 1 wk  -JW      Patient Education PT STG, Education Type written program;HEP  -JW      Patient Education PT STG, Education Understanding demonstrate adequately;verbalize understanding  -        User Key  (r) = Recorded By, (t) = Taken By, (c) = Cosigned By    Initials Name Provider Type    LYNNE Edmond, PT Physical Therapist          Physical Therapy Education     Title: PT OT SLP Therapies (Done)     Topic: Physical Therapy (Done)     Point: Mobility training (Done)    Learning Progress Summary    Learner Readiness Method Response Comment Documented by Status   Patient Acceptance E [facility] 09/01/17 1256 Done    Acceptance E [facility] 08/31/17 1550 Done    Acceptance E,TB,D VU   08/31/17 1206 Done    Acceptance E [facility] 08/30/17 1152 Done    Acceptance E,TB,D,H VU   08/29/17 1517 Done    Acceptance E,TB,D,H VU continues to require cues with some ex  08/29/17 1000 Done    Acceptance E,TB,D,H VU cues with some ex but pt able to perform easier this pm(and with no physical assist)  08/28/17 1423 Done    Acceptance E,TB,D,H VU,DU pt has written HEP- reviewed with pt- required cues with some ex.  Also discussed pain and benefits of taking pain meds  08/28/17 1045 Done    Acceptance E [facility] 08/27/17 0933 Done    Acceptance E [facility] 08/26/17 1024 Done               Point: Home exercise program (Done)    Learning Progress Summary    Learner Readiness Method Response Comment Documented by Status   Patient Acceptance E,TB,D [facility] 08/31/17 1206 Done    Acceptance E [facility] 08/30/17 1152 Done     Acceptance E,TB,D,H VU   08/29/17 1517 Done    Acceptance E,TB,D,H VU continues to require cues with some ex  08/29/17 1000 Done    Acceptance E,TB,D,H VU cues with some ex but pt able to perform easier this pm(and with no physical assist)  08/28/17 1423 Done    Acceptance E,TB,D,H VU,DU pt has written HEP- reviewed with pt- required cues with some ex.  Also discussed pain and benefits of taking pain meds  08/28/17 1045 Done    Acceptance E VU   08/27/17 0933 Done    Acceptance E VU   08/26/17 1024 Done                      User Key     Initials Effective Dates Name Provider Type Discipline     08/11/15 -  Karyn Azul, PTA Physical Therapy Assistant PT     12/01/15 -  Terese Edmond, PT Physical Therapist PT                    PT Recommendation and Plan  Anticipated Discharge Disposition: home with home health  Planned Therapy Interventions: balance training, bed mobility training, gait training, home exercise program, strengthening, transfer training, patient/family education  PT Frequency: other (see comments) (QD-BID 7x/week)  Plan of Care Review  Plan Of Care Reviewed With: patient  Outcome Summary/Follow up Plan: PT: Patient requires verbal cues for safety with rollator during gait.  Patient able to perform gait x400 feet with 1 standing rest break due to fatigue.  Patient ascends/descends 5 steps x2, initially with min assist however improved to CGA on 2nd trial.  Patient will benefit from continued PT services.  Plan to see once daily x3 days prior to discharge.          Time Calculation:         PT Charges       09/01/17 1258          Time Calculation    Start Time 0943  -      Stop Time 1007  -      Time Calculation (min) 24 min  -      PT Received On 09/01/17  -LYNNE      PT - Next Appointment 09/02/17  -      Time Calculation- PT    TCU Minutes- PT 24 min  -        User Key  (r) = Recorded By, (t) = Taken By, (c) = Cosigned By    Initials Name Provider Type    LYNNE Edmond  PT Physical Therapist          Therapy Charges for Today     Code Description Service Date Service Provider Modifiers Qty    73386034426  PT THER PROC EA 15 MIN 8/31/2017 Terese Edmond, PT GP 1    [id number] HC PT THER PROC EA 15 MIN 9/1/2017 Terese Edmond, PT GP 1    [id number]  GAIT TRAINING EA 15 MIN 9/1/2017 Terese Edmond, PT GP 1               Terese Edmond, PT  9/1/2017

## 2017-09-01 NOTE — PLAN OF CARE
Problem: Inpatient Physical Therapy  Goal: Transfer Training Goal 1 STG- PT  Outcome: Outcome(s) achieved Date Met:  09/01/17

## 2017-09-02 LAB
INR PPP: 2.38 (ref 0.9–1.1)
PROTHROMBIN TIME: 26.4 SECONDS (ref 12.1–15)

## 2017-09-02 PROCEDURE — 97116 GAIT TRAINING THERAPY: CPT

## 2017-09-02 PROCEDURE — 85610 PROTHROMBIN TIME: CPT | Performed by: FAMILY MEDICINE

## 2017-09-02 RX ADMIN — Medication 1 TABLET: at 10:15

## 2017-09-02 RX ADMIN — ATORVASTATIN CALCIUM 10 MG: 10 TABLET, FILM COATED ORAL at 10:15

## 2017-09-02 RX ADMIN — HYDROCODONE BITARTRATE AND ACETAMINOPHEN 1 TABLET: 7.5; 325 TABLET ORAL at 20:16

## 2017-09-02 RX ADMIN — FLUOXETINE 20 MG: 20 CAPSULE ORAL at 10:15

## 2017-09-02 RX ADMIN — HYDROCODONE BITARTRATE AND ACETAMINOPHEN 1 TABLET: 7.5; 325 TABLET ORAL at 10:16

## 2017-09-02 RX ADMIN — DOCUSATE SODIUM AND SENNOSIDES 2 TABLET: 8.6; 5 TABLET, FILM COATED ORAL at 10:15

## 2017-09-02 RX ADMIN — HYDROCODONE BITARTRATE AND ACETAMINOPHEN 1 TABLET: 7.5; 325 TABLET ORAL at 03:48

## 2017-09-02 NOTE — PLAN OF CARE
Problem: Patient Care Overview (Adult)  Goal: Plan of Care Review    09/02/17 8491   Coping/Psychosocial Response Interventions   Plan Of Care Reviewed With patient   Outcome Evaluation   Outcome Summary/Follow up Plan PT note: Patient able to ambulate 400 feet today with rollator with SBA-CGA of 1. Patient ambulated up and down 5 steps with 1 rail on right (ascending) with CGA of 1 and cueing. She still needs minimal assist with right leg to get into bed. Will continue to follow patient and continue to work on bed mobility as well as practice stairs again prior to discharge home.    Patient Care Overview   Progress improving

## 2017-09-02 NOTE — PLAN OF CARE
Problem: Skin Integrity Impairment, Risk/Actual (Adult)  Goal: Skin Integrity/Wound Healing  Outcome: Ongoing (interventions implemented as appropriate)    08/31/17 1201   Skin Integrity Impairment, Risk/Actual (Adult)   Skin Integrity/Wound Healing achieves outcome         Problem: Fall Risk (Adult)  Goal: Absence of Falls  Outcome: Ongoing (interventions implemented as appropriate)    08/31/17 1201   Fall Risk (Adult)   Absence of Falls achieves outcome         Problem: Pain, Acute (Adult)  Goal: Acceptable Pain Control/Comfort Level  Outcome: Ongoing (interventions implemented as appropriate)    08/31/17 1201   Pain, Acute (Adult)   Acceptable Pain Control/Comfort Level achieves outcome         Problem: Patient Care Overview (Adult)  Goal: Plan of Care Review  Outcome: Ongoing (interventions implemented as appropriate)    08/31/17 1206 09/01/17 1256   Coping/Psychosocial Response Interventions   Plan Of Care Reviewed With --  patient   Outcome Evaluation   Outcome Summary/Follow up Plan --  PT: Patient requires verbal cues for safety with rollator during gait. Patient able to perform gait x400 feet with 1 standing rest break due to fatigue. Patient ascends/descends 5 steps x2, initially with min assist however improved to CGA on 2nd trial. Patient will benefit from continued PT services. Plan to see once daily x3 days prior to discharge.    Patient Care Overview   Progress improving --

## 2017-09-02 NOTE — THERAPY TREATMENT NOTE
SNF - Physical Therapy Treatment Note   Stephen     Patient Name: Gaby Diaz  : 2/10/1931  MRN: [id number]  Today's Date: 2017  Onset of Illness/Injury or Date of Surgery Date: 17  Date of Referral to PT: 17  Referring Physician: Liane    Admit Date: 2017    Visit Dx:  No diagnosis found.  Patient Active Problem List   Diagnosis   • Atrial fibrillation   • Hyperlipidemia   • Hypertension   • Encounter for rehabilitation               Adult Rehabilitation Note       17 1025 17 0943 17 1340    Rehab Assessment/Intervention    Discipline physical therapist  -LN physical therapist  -JW physical therapist  -JW    Document Type therapy note (daily note)  -LN therapy note (daily note)  -JW therapy note (daily note)  -JW    Subjective Information agree to therapy;no complaints  -LN agree to therapy;no complaints  -JW agree to therapy;no complaints  -JW    Patient Effort, Rehab Treatment good  -LN good  -JW good  -JW    Precautions/Limitations fall precautions  -LN fall precautions  -JW fall precautions  -JW    Recorded by [LN] Paige Casey, PT [JW] Terese Edmond, PT [JW] Terese Edmond, PT    Pain Assessment    Pain Assessment No/denies pain  -LN No/denies pain  -JW No/denies pain  -JW    Recorded by [LN] Paige Casey, PT [JW] Terese Edmond, PT [JW] Terese Edmond, PT    Bed Mobility, Assessment/Treatment    Bed Mob, Supine to Sit, [place] not tested   up in chair  -LN      Bed Mob, Sit to Supine, [place] minimum assist (75% patient effort)   assist with right leg  -LN  minimum assist (75% patient effort);verbal cues required  -JW    Bed Mobility, Comment  deferred-up in chair  -JW verbal cues and demonstration required for optimal technique to return to supine  -JW    Recorded by [LN] Paige Casey, PT [JW] Terese Edmond, PT [JW] Terese Edmond, PT    Transfer Assessment/Treatment    Transfers, Sit-Stand [place]  supervision required;stand by assist  -LN supervision required;verbal cues required  -JW supervision required  -JW    Transfers, Stand-Sit [place] supervision required;stand by assist  -LN supervision required;verbal cues required  -JW supervision required  -JW    Transfers, Sit-Stand-Sit, Assist Device other (see comments)   rollator  -LN other (see comments)   rollator  -JW other (see comments)   rollator  -JW    Toilet Transfer, [place] contact guard assist;supervision required  -LN  supervision required;verbal cues required  -JW    Toilet Transfer, Assistive Device other (see comments)   rollator  -LN  bedside commode without drop arms;rolling walker  -JW    Transfer, Comment  requires increased time to stand from recliner surface.  verbal cues for adequate scooting to edge of chair  -JW requires increased time for sit to stand transfers from recliner surface  -JW    Recorded by [LN] Paige Casey, PT [JW] Terese Edmond, PT [JW] Terese Edmond, PT    Gait Assessment/Treatment    Gait, [place] Level stand by assist;contact guard assist;verbal cues required  -LN stand by assist;verbal cues required  -JW stand by assist;verbal cues required  -JW    Gait, Assistive Device rollator  -LN rollator  -JW rollator  -JW    Gait, Distance (Feet) 400  -   1 standing rest break  -JW --   350 with 1 seated rest break  -JW    Gait, Gait Pattern Analysis swing-through gait  -LN swing-through gait  -JW swing-through gait  -JW    Gait, Gait Deviations misael decreased;forward flexed posture;step length decreased  -LN forward flexed posture;misael decreased;step length decreased;narrow base  -JW forward flexed posture;step length decreased;narrow base  -JW    Gait, Safety Issues  balance decreased during turns  -JW step length decreased;balance decreased during turns  -JW    Gait, Comment  pt requires cues for proper distance from rollator  -JW pt requires cues for avoidance of external obstacles  with rollator.  Patient requires 1 seated rest break during gait training.  Patient requires cues for upright posture.  -JW    Recorded by [LN] Paige Casey, PT [JW] Terese Edmond, PT [JW] Terese Edmond, PT    Stairs Assessment/Treatment    Number of Stairs 5  -LN 5  -JW     Stairs, Handrail Location right side (ascending)  -LN right side (ascending)  -JW     Stairs, [place] Level verbal cues required;contact guard assist  -LN contact guard assist;minimum assist (75% patient effort)  -     Stairs, Technique Used step to step (ascending);step to step (descending)  -LN step to step (ascending);step to step (descending)  -     Stairs, Comment verbal cues needed on 1st step to lead with correct leg.   -LN pt with 1 episode of not adquately clearing step, requires min assist to correct balance.  2 trials of 5 steps attempted with improved safety on 2nd attempt  -JW     Recorded by [LN] Paige Casey, PT [JW] Terese Edmond, PT     Positioning and Restraints    Pre-Treatment Position sitting in chair/recliner  -LN sitting in chair/recliner  -JW sitting in chair/recliner  -JW    Post Treatment Position bed  -LN chair  -JW bed  -JW    In Bed supine;call light within reach;encouraged to call for assist;side rails up x2  -LN  supine;call light within reach;encouraged to call for assist;with family/caregiver  -JW    In Chair  reclined;encouraged to call for assist;call light within reach  -JW     Recorded by [LN] Paige Casey, PT [JW] Terese Edmond, PT [JW] Terese Edmond PT      08/31/17 1326 08/31/17 0942       Rehab Assessment/Intervention    Discipline occupational therapist  -KAYLIN physical therapy assistant  -KM     Document Type therapy note (daily note)  -KAYLIN therapy note (daily note)  -KM     Subjective Information agree to therapy  -KAYLIN agree to therapy  -KM     Patient Effort, Rehab Treatment good  -JISABELL good  -KM     Symptoms Noted Comment  pt reports fatigue at end of session   -KM     Precautions/Limitations fall precautions  -JJ fall precautions  -KM     Recorded by [JJ] Jenni Zaldivar, OTR [KM] Karyn Azul PTA     Pain Assessment    Pain Assessment No/denies pain  -J No/denies pain  -KM     Pain Score  --   states she took pain med prior  -KM     Recorded by [JJ] Jenni Zaldivar, OTR [KM] Karyn Azul PTA     Bed Mobility, Assessment/Treatment    Bed Mobility, Comment deferred up in chair  -JJ pt up in chair  -KM     Recorded by [JJ] Jenni Zaldivar, OTR [KM] Karyn Azul PTA     Transfer Assessment/Treatment    Transfers, Sit-Stand [place] stand by assist  -JJ stand by assist;verbal cues required  -KM     Transfers, Stand-Sit [place] stand by assist  - stand by assist;verbal cues required  -KM     Transfers, Sit-Stand-Sit, Assist Device --   rollator  - --   rollator  -     Transfer, Comment pt requires extended time for sit to stand transfers from recliner chair  - continues to require cues for hand placement.  Practiced sit-stand to sit from various chairs- pt needs cues to scoot out to edge of chair prior to standing.  IF she attempts sit-stand without scooting out- she has difficulty reaching full stance.  -KM     Recorded by [J] Jenni Zaldivar, OTR [KM] Karyn Azul PTA     Gait Assessment/Treatment    Gait, [place] Level  stand by assist;contact guard assist;verbal cues required  -KM     Gait, Assistive Device  rollator  -KM     Gait, Distance (Feet)  --   400 total with 2 rest breaks  -KM     Gait, Gait Deviations  forward flexed posture  -KM     Gait, Comment  continues to require cues for upright posture and to stay up with rollator.  Had pt ambulate outside on sidewalk.  At times she would move rollator too far away from her trying to avoid a stick, rock, etc.  Cues to keep rollator with her.  Cues to slow down with going down ramp but did fine with going up slight ramp  -KM     Recorded by  [KM] Karyn Azul PTA      Functional Mobility    Functional Mobility- Ind. Level --   SBA  -      Functional Mobility- Device rollator  -      Functional Mobility-Distance (Feet) --   to bathroom  -      Recorded by [JJ] EDIN Gaston      Balance Skills Training    Standing-Level of Assistance Close supervision  -      Static Standing Balance Support assistive device  -      Standing-Balance Activities --   washing hands at sink  -      Standing Balance # of Minutes --   3 minutes  -      Recorded by [JJ] EDIN Gaston      Therapy Exercises    Bilateral Lower Extremities  20 reps;ankle pumps/circles;hip abduction/adduction;LAQ;quad sets   marching  -KM     Recorded by  [KM] Karyn Azul PTA     Positioning and Restraints    Pre-Treatment Position sitting in chair/recliner  -ISABELL sitting in chair/recliner  -KM     Post Treatment Position chair  -J chair  -KM     In Chair --   ambulating in room with PT  -J --   in activities per pt request  -KM     Recorded by [KAYLIN] Jenni Zaldivar OTR [KM] Karyn Azul PTA       User Key  (r) = Recorded By, (t) = Taken By, (c) = Cosigned By    Initials Name Effective Dates    KM Karyn Azul, PTA 08/11/15 -     KAYLIN Zaldivar, OTR 06/22/16 -     LN Paige Casey, PT 06/22/16 -     JW Terese Workmanxander, PT 12/01/15 -                 IP PT Goals       09/01/17 1301 08/26/17 1025       Bed Mobility PT STG    Bed Mobility PT STG, Date Established  08/26/17  -     Bed Mobility PT STG, Time to Achieve  1 wk  -     Bed Mobility PT STG, Activity Type  all bed mobility  -     Bed Mobility PT STG, [place] Level  supervision required  -     Bed Mobility PT STG, Date Goal Reviewed 09/01/17  -      Bed Mobility PT STG, Outcome goal partially met  -      Transfer Training PT STG    Transfer Training PT STG, Date Established  08/26/17  -     Transfer Training PT STG, Time to Achieve  1 wk  -     Transfer Training PT STG, Activity  Type  all transfers  -JW     Transfer Training PT STG, [place] Level  supervision required  -JW     Transfer Training PT STG, Assist Device  --   with appropriate device  -JW     Transfer Training PT STG, Date Goal Reviewed 09/01/17  -JW      Transfer Training PT STG, Outcome goal met  -JW      Gait Training PT STG    Gait Training Goal PT STG, Date Established  08/26/17  -JW     Gait Training Goal PT STG, Time to Achieve  1 wk  -JW     Gait Training Goal PT STG, [place] Level  supervision required  -JW     Gait Training Goal PT STG, Assist Device  --   with appropriate device  -JW     Gait Training Goal PT STG, Distance to Achieve  200  -JW     Gait Training Goal PT STG, Date Goal Reviewed 09/01/17  -JW      Gait Training Goal PT STG, Outcome goal met  -JW      Stair Training PT STG    Stair Training Goal PT STG, Date Established  08/26/17  -     Stair Training Goal PT STG, Time to Achieve  1 wk  -JW     Stair Training Goal PT STG, Number of Steps  3  -JW     Stair Training Goal PT STG, [place] Level  contact guard assist  -JW     Stair Training Goal PT STG, Assist Device  1 handrail  -JW     Stair Training Goal PT STG, Date Goal Reviewed 09/01/17  -JW      Stair Training Goal PT STG, Outcome goal ongoing  -JW      Patient Education PT STG    Patient Education PT STG, Date Established  08/26/17  -     Patient Education PT STG, Time to Achieve  1 wk  -JW     Patient Education PT STG, Education Type  written program;HEP  -JW     Patient Education PT STG, Education Understanding  demonstrate adequately;verbalize understanding  -JW     Patient Education PT STG, Date Goal Reviewed 09/01/17  -      Patient Education PT STG Outcome goal met  -JW        User Key  (r) = Recorded By, (t) = Taken By, (c) = Cosigned By    Initials Name Provider Type    LYNNE Edmond PT Physical Therapist          Physical Therapy Education     Title: PT OT SLP Therapies (Done)     Topic: Physical Therapy (Done)      Point: Mobility training (Done)    Learning Progress Summary    Learner Readiness Method Response Comment Documented by Status   Patient Acceptance E VU Education provided on functional mobility and gait training with rollator and gait up and down 5 steps with 1 rail on right (ascending). Cues needed on first step to lead with correct foot- could use more practice on steps prior to discharge home. LN 09/02/17 1257 Done    Acceptance E VU   09/01/17 1256 Done    Acceptance E VU   08/31/17 1550 Done    Acceptance E,TB,D VU   08/31/17 1206 Done    Acceptance E VU   08/30/17 1152 Done    Acceptance E,TB,D,H VU   08/29/17 1517 Done    Acceptance E,TB,D,H VU continues to require cues with some ex  08/29/17 1000 Done    Acceptance E,TB,D,H VU cues with some ex but pt able to perform easier this pm(and with no physical assist)  08/28/17 1423 Done    Acceptance E,TB,D,H VU,DU pt has written HEP- reviewed with pt- required cues with some ex.  Also discussed pain and benefits of taking pain meds  08/28/17 1045 Done    Acceptance E VU   08/27/17 0933 Done    Acceptance E [facility] 08/26/17 1024 Done               Point: Home exercise program (Done)    Learning Progress Summary    Learner Readiness Method Response Comment Documented by Status   Patient Acceptance E,TB,D VU   08/31/17 1206 Done    Acceptance E VU   08/30/17 1152 Done    Acceptance E,TB,D,H VU   08/29/17 1517 Done    Acceptance E,TB,D,H VU continues to require cues with some ex  08/29/17 1000 Done    Acceptance E,TB,D,H VU cues with some ex but pt able to perform easier this pm(and with no physical assist)  08/28/17 1423 Done    Acceptance E,TB,D,H VU,DU pt has written HEP- reviewed with pt- required cues with some ex.  Also discussed pain and benefits of taking pain meds  08/28/17 1045 Done    Acceptance E VU   08/27/17 0933 Done    Acceptance E [facility] 08/26/17 1024 Done                      User Key     Initials Effective Dates Name  Provider Type Discipline    KM 08/11/15 -  Karyn Azul, PTA Physical Therapy Assistant PT    LN 06/22/16 -  Paige Casey, PT Physical Therapist PT    JW 12/01/15 -  Terese Edmond, RAMAN Physical Therapist PT                    PT Recommendation and Plan  Anticipated Discharge Disposition: home with home health  Planned Therapy Interventions: balance training, bed mobility training, gait training, home exercise program, strengthening, transfer training, patient/family education  PT Frequency: daily  Plan of Care Review  Plan Of Care Reviewed With: patient  Progress: improving  Outcome Summary/Follow up Plan: PT note: Patient able to ambulate 400 feet today with rollator with SBA-CGA of 1. Patient ambulated up and down 5 steps with 1 rail on right (ascending) with CGA of 1 and cueing. She still needs minimal assist with right leg to get into bed. Will continue to follow patient and continue to work on bed mobility as well as practice stairs again prior to discharge home.          Time Calculation:         PT Charges       09/02/17 1303          Time Calculation    Start Time 1025  -LN      Stop Time 1045  -LN      Time Calculation (min) 20 min  -LN        User Key  (r) = Recorded By, (t) = Taken By, (c) = Cosigned By    Initials Name Provider Type    LN Paige Casey, PT Physical Therapist          Therapy Charges for Today     Code Description Service Date Service Provider Modifiers Qty    [id number] HC GAIT TRAINING EA 15 MIN 9/2/2017 Paige Casey, PT GP 1               Paige Casey, PT  9/2/2017

## 2017-09-03 LAB
INR PPP: 1.73 (ref 0.9–1.1)
PROTHROMBIN TIME: 20.5 SECONDS (ref 12.1–15)

## 2017-09-03 PROCEDURE — 85610 PROTHROMBIN TIME: CPT | Performed by: FAMILY MEDICINE

## 2017-09-03 RX ORDER — WARFARIN SODIUM 2 MG/1
2 TABLET ORAL
Status: DISCONTINUED | OUTPATIENT
Start: 2017-09-04 | End: 2017-09-06 | Stop reason: HOSPADM

## 2017-09-03 RX ORDER — WARFARIN SODIUM 2.5 MG/1
2.5 TABLET ORAL
Status: COMPLETED | OUTPATIENT
Start: 2017-09-03 | End: 2017-09-03

## 2017-09-03 RX ADMIN — HYDROCODONE BITARTRATE AND ACETAMINOPHEN 1 TABLET: 7.5; 325 TABLET ORAL at 20:33

## 2017-09-03 RX ADMIN — HYDROCODONE BITARTRATE AND ACETAMINOPHEN 1 TABLET: 7.5; 325 TABLET ORAL at 08:25

## 2017-09-03 RX ADMIN — HYDROCODONE BITARTRATE AND ACETAMINOPHEN 1 TABLET: 7.5; 325 TABLET ORAL at 03:49

## 2017-09-03 RX ADMIN — DOCUSATE SODIUM AND SENNOSIDES 2 TABLET: 8.6; 5 TABLET, FILM COATED ORAL at 08:24

## 2017-09-03 RX ADMIN — ATORVASTATIN CALCIUM 10 MG: 10 TABLET, FILM COATED ORAL at 08:24

## 2017-09-03 RX ADMIN — WARFARIN SODIUM 2.5 MG: 2.5 TABLET ORAL at 17:23

## 2017-09-03 RX ADMIN — FLUOXETINE 20 MG: 20 CAPSULE ORAL at 08:24

## 2017-09-03 RX ADMIN — Medication 1 TABLET: at 08:24

## 2017-09-03 NOTE — PROGRESS NOTES
"Daily Progress Note:    Subjective: progressing well with therapies    Flowsheet Rows         First Filed Value    Admission Height  66\" (167.6 cm) Documented at 08/25/2017 1515    Admission Weight  150 lb 3.2 oz (68.1 kg) Documented at 08/25/2017 1515          Patient Vitals for the past 24 hrs:   BP Temp Temp src Pulse Resp SpO2   09/03/17 0825 134/72 97.4 °F (36.3 °C) Oral 86 18 96 %   09/02/17 2129 151/69 97.1 °F (36.2 °C) Oral 84 18 97 %         Intake/Output Summary (Last 24 hours) at 09/03/17 1236  Last data filed at 09/03/17 0800   Gross per 24 hour   Intake              720 ml   Output                0 ml   Net              720 ml       Review of Systems   Constitutional: Negative for activity change, appetite change and fatigue.   Respiratory: Negative for cough, chest tightness, shortness of breath and wheezing.    Cardiovascular: Negative for chest pain.   Gastrointestinal: Negative for abdominal distention, abdominal pain, diarrhea, nausea and vomiting.   Genitourinary: Negative for frequency.   Skin: Negative for rash.   Psychiatric/Behavioral: Negative for agitation.       Physical Exam   Constitutional: She appears well-developed and well-nourished.   HENT:   Head: Normocephalic.   Mouth/Throat: Oropharynx is clear and moist.   Eyes: Conjunctivae are normal.   Neck: Normal range of motion. No JVD present. No thyromegaly present.   Cardiovascular: Normal rate and normal heart sounds.  An irregularly irregular rhythm present.   No murmur heard.  Pulmonary/Chest: Effort normal and breath sounds normal. No respiratory distress. She has no wheezes. She has no rales.   Abdominal: Soft. Bowel sounds are normal. She exhibits no distension. There is no tenderness. There is no guarding.   Neurological: She is alert.   Skin: Skin is warm and dry. No rash noted.   Nursing note and vitals reviewed.          Medication Review:   I have reviewed the patient's current medication list      atorvastatin 10 mg Oral " Daily   FLUoxetine 20 mg Oral Daily   multivitamin with minerals 1 tablet Oral Daily   sennosides-docusate sodium 2 tablet Oral BID           Labs:    Results from last 7 days  Lab Units 08/29/17  0443   WBC 10*3/mm3 7.86   HEMOGLOBIN g/dL 9.1*   HEMATOCRIT % 28.4*   PLATELETS 10*3/mm3 232           Invalid input(s): LABALBU, PROT        Lab Results (last 24 hours)     Procedure Component Value Units Date/Time    Protime-INR [[id number]]  (Abnormal) Collected:  09/03/17 0400    Specimen:  Blood Updated:  09/03/17 0454     Protime 20.5 (H) Seconds      INR 1.73 (H)    Narrative:       Therapeutic Ranges for INR: 2.0-3.0 (PT 20-30)                              2.5-3.5 (PT 25-34)              Radiology:  Imaging Results (last 24 hours)     ** No results found for the last 24 hours. **          Cardiology:  ECG/EMG Results (last 24 hours)     ** No results found for the last 24 hours. **          I have reviewed consult notes.    Assessment and Plan:          1. 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  Hypertension well controlled.    3.  Atrial fibrillation with chronic anticoagulation.  Continue to monitor INR    4.  Hyperlipidemia nothing acute     5.  Chronic kidney disease stage III stable no new changes  6. Anemia check labs

## 2017-09-03 NOTE — PLAN OF CARE
Problem: Skin Integrity Impairment, Risk/Actual (Adult)  Goal: Identify Related Risk Factors and Signs and Symptoms  Outcome: Ongoing (interventions implemented as appropriate)  Goal: Skin Integrity/Wound Healing  Outcome: Ongoing (interventions implemented as appropriate)    09/03/17 1534   Skin Integrity Impairment, Risk/Actual (Adult)   Skin Integrity/Wound Healing achieves outcome         Problem: Fall Risk (Adult)  Goal: Identify Related Risk Factors and Signs and Symptoms  Outcome: Ongoing (interventions implemented as appropriate)    09/03/17 1534   Fall Risk   Fall Risk: Signs and Symptoms presence of risk factors       Goal: Absence of Falls  Outcome: Ongoing (interventions implemented as appropriate)    09/03/17 1534   Fall Risk (Adult)   Absence of Falls achieves outcome

## 2017-09-04 LAB
ANION GAP SERPL CALCULATED.3IONS-SCNC: 10.7 MMOL/L
BUN BLD-MCNC: 21 MG/DL (ref 8–23)
BUN/CREAT SERPL: 21.9 (ref 7–25)
CALCIUM SPEC-SCNC: 7.9 MG/DL (ref 8.8–10.5)
CHLORIDE SERPL-SCNC: 99 MMOL/L (ref 98–107)
CO2 SERPL-SCNC: 26.3 MMOL/L (ref 22–29)
CREAT BLD-MCNC: 0.96 MG/DL (ref 0.57–1)
DEPRECATED RDW RBC AUTO: 48.6 FL (ref 37–54)
ERYTHROCYTE [DISTWIDTH] IN BLOOD BY AUTOMATED COUNT: 13.8 % (ref 11.5–14.5)
GFR SERPL CREATININE-BSD FRML MDRD: 55 ML/MIN/1.73
GLUCOSE BLD-MCNC: 89 MG/DL (ref 65–99)
HCT VFR BLD AUTO: 29.3 % (ref 37–47)
HGB BLD-MCNC: 9.4 G/DL (ref 12–16)
INR PPP: 1.62 (ref 0.9–1.1)
MCH RBC QN AUTO: 31.3 PG (ref 27–31)
MCHC RBC AUTO-ENTMCNC: 32.1 G/DL (ref 31–37)
MCV RBC AUTO: 97.7 FL (ref 81–99)
PLATELET # BLD AUTO: 439 10*3/MM3 (ref 140–500)
PMV BLD AUTO: 9.5 FL (ref 7.4–10.4)
POTASSIUM BLD-SCNC: 4 MMOL/L (ref 3.5–5.2)
PROTHROMBIN TIME: 19.4 SECONDS (ref 12.1–15)
RBC # BLD AUTO: 3 10*6/MM3 (ref 4.2–5.4)
SODIUM BLD-SCNC: 136 MMOL/L (ref 136–145)
WBC NRBC COR # BLD: 8.5 10*3/MM3 (ref 4.8–10.8)

## 2017-09-04 PROCEDURE — 85027 COMPLETE CBC AUTOMATED: CPT | Performed by: FAMILY MEDICINE

## 2017-09-04 PROCEDURE — 85610 PROTHROMBIN TIME: CPT | Performed by: FAMILY MEDICINE

## 2017-09-04 PROCEDURE — 97116 GAIT TRAINING THERAPY: CPT

## 2017-09-04 PROCEDURE — 80048 BASIC METABOLIC PNL TOTAL CA: CPT | Performed by: FAMILY MEDICINE

## 2017-09-04 RX ORDER — WARFARIN SODIUM 5 MG/1
5 TABLET ORAL
Status: COMPLETED | OUTPATIENT
Start: 2017-09-04 | End: 2017-09-04

## 2017-09-04 RX ADMIN — DOCUSATE SODIUM AND SENNOSIDES 2 TABLET: 8.6; 5 TABLET, FILM COATED ORAL at 16:53

## 2017-09-04 RX ADMIN — Medication 1 TABLET: at 09:49

## 2017-09-04 RX ADMIN — HYDROCODONE BITARTRATE AND ACETAMINOPHEN 1 TABLET: 7.5; 325 TABLET ORAL at 16:53

## 2017-09-04 RX ADMIN — WARFARIN SODIUM 5 MG: 5 TABLET ORAL at 16:53

## 2017-09-04 RX ADMIN — FLUOXETINE 20 MG: 20 CAPSULE ORAL at 09:49

## 2017-09-04 RX ADMIN — DOCUSATE SODIUM AND SENNOSIDES 2 TABLET: 8.6; 5 TABLET, FILM COATED ORAL at 09:49

## 2017-09-04 RX ADMIN — HYDROCODONE BITARTRATE AND ACETAMINOPHEN 1 TABLET: 7.5; 325 TABLET ORAL at 02:14

## 2017-09-04 RX ADMIN — HYDROCODONE BITARTRATE AND ACETAMINOPHEN 1 TABLET: 7.5; 325 TABLET ORAL at 09:49

## 2017-09-04 RX ADMIN — ATORVASTATIN CALCIUM 10 MG: 10 TABLET, FILM COATED ORAL at 09:49

## 2017-09-04 NOTE — PLAN OF CARE
Problem: Patient Care Overview (Adult)  Goal: Plan of Care Review    09/04/17 0951   Coping/Psychosocial Response Interventions   Plan Of Care Reviewed With patient   Outcome Evaluation   Outcome Summary/Follow up Plan PT note: Patient ambulated well with rollator 400 feet with SBA-CGA of 1. She practiced stairs again and intially lead with correct foot going up stairs without any cueing but 2nd time needind cueing. Good balance noted with ambulation. Will see for 1 more visit prior to discharge.    Patient Care Overview   Progress improving

## 2017-09-04 NOTE — THERAPY TREATMENT NOTE
"SNF - Physical Therapy Treatment Note   Goldie Daniels     Patient Name: Gaby Diaz  : 2/10/1931  MRN: [id number]  Today's Date: 2017  Onset of Illness/Injury or Date of Surgery Date: 17  Date of Referral to PT: 17  Referring Physician: Liane    Admit Date: 2017    Visit Dx:  No diagnosis found.  Patient Active Problem List   Diagnosis   • Atrial fibrillation   • Hyperlipidemia   • Hypertension   • Encounter for rehabilitation               Adult Rehabilitation Note       17 0840 17 1025       Rehab Assessment/Intervention    Discipline physical therapist  -LN physical therapist  -LN     Document Type therapy note (daily note)  -LN therapy note (daily note)  -LN     Subjective Information agree to therapy;no complaints  -LN agree to therapy;no complaints  -LN     Patient Effort, Rehab Treatment good  -LN good  -LN     Precautions/Limitations fall precautions  -LN fall precautions  -LN     Specific Treatment Considerations \"I get to go home on Wednesday.\"   -LN      Recorded by [LN] Paige Casey, PT [LN] Paige Casey, PT     Pain Assessment    Pain Assessment No/denies pain  -LN No/denies pain  -LN     Recorded by [LN] Paige Casey, PT [LN] Paige Casey, PT     Cognitive Assessment/Intervention    Personal Safety Interventions fall prevention program maintained;gait belt;nonskid shoes/slippers when out of bed  -LN      Recorded by [LN] Paige Casey, PT      Bed Mobility, Assessment/Treatment    Bed Mob, Supine to Sit, [place]  not tested   up in chair  -LN     Bed Mob, Sit to Supine, [place]  minimum assist (75% patient effort)   assist with right leg  -LN     Bed Mobility, Comment deferred- up in chair  -LN      Recorded by [LN] Paige Casey, PT [LN] Paige Casey, PT     Transfer Assessment/Treatment    Transfers, Sit-Stand [place] supervision required  -LN supervision required;stand by " assist  -LN     Transfers, Stand-Sit [place] supervision required  -LN supervision required;stand by assist  -LN     Transfers, Sit-Stand-Sit, Assist Device other (see comments)   rollator  -LN other (see comments)   rollator  -LN     Toilet Transfer, [place]  contact guard assist;supervision required  -LN     Toilet Transfer, Assistive Device  other (see comments)   rollator  -LN     Recorded by [LN] Paige Casey, PT [LN] Paige Casey PT     Gait Assessment/Treatment    Gait, [place] Level stand by assist;supervision required  -LN stand by assist;contact guard assist;verbal cues required  -LN     Gait, Assistive Device rollator  -LN rollator  -LN     Gait, Distance (Feet) 400  -  -LN     Gait, Gait Pattern Analysis swing-through gait  -LN swing-through gait  -LN     Gait, Gait Deviations misael decreased;forward flexed posture;step length decreased  -LN misael decreased;forward flexed posture;step length decreased  -LN     Recorded by [LN] Paige Casey, PT [LN] Paige Casey, PT     Stairs Assessment/Treatment    Number of Stairs 5  -LN 5  -LN     Stairs, Handrail Location right side (ascending)  -LN right side (ascending)  -LN     Stairs, [place] Level verbal cues required;contact guard assist  -LN verbal cues required;contact guard assist  -LN     Stairs, Technique Used step to step (ascending);step to step (descending)  -LN step to step (ascending);step to step (descending)  -LN     Stairs, Comment patient initially stepped up with correct leg but 2nd time, needed cueing to lead with the correct leg.   -LN verbal cues needed on 1st step to lead with correct leg.   -LN     Recorded by [LN] Paige Casey, PT [LN] Paige Casey, PT     Positioning and Restraints    Pre-Treatment Position sitting in chair/recliner  -LN sitting in chair/recliner  -LN     Post Treatment Position chair  -LN bed  -LN     In Bed  supine;call light  within reach;encouraged to call for assist;side rails up x2  -LN     In Chair call light within reach;encouraged to call for assist;legs elevated  -LN      Recorded by [LN] Paige Casey, PT [LN] Paige Casey, PT       User Key  (r) = Recorded By, (t) = Taken By, (c) = Cosigned By    Initials Name Effective Dates    LN Paige Casey, PT 06/22/16 -                 IP PT Goals       09/01/17 1301 08/26/17 1025       Bed Mobility PT STG    Bed Mobility PT STG, Date Established  08/26/17  -JW     Bed Mobility PT STG, Time to Achieve  1 wk  -JW     Bed Mobility PT STG, Activity Type  all bed mobility  -JW     Bed Mobility PT STG, [place] Level  supervision required  -JW     Bed Mobility PT STG, Date Goal Reviewed 09/01/17  -JW      Bed Mobility PT STG, Outcome goal partially met  -JW      Transfer Training PT STG    Transfer Training PT STG, Date Established  08/26/17  -JW     Transfer Training PT STG, Time to Achieve  1 wk  -JW     Transfer Training PT STG, Activity Type  all transfers  -JW     Transfer Training PT STG, [place] Level  supervision required  -JW     Transfer Training PT STG, Assist Device  --   with appropriate device  -JW     Transfer Training PT STG, Date Goal Reviewed 09/01/17  -JW      Transfer Training PT STG, Outcome goal met  -JW      Gait Training PT STG    Gait Training Goal PT STG, Date Established  08/26/17  -JW     Gait Training Goal PT STG, Time to Achieve  1 wk  -JW     Gait Training Goal PT STG, [place] Level  supervision required  -JW     Gait Training Goal PT STG, Assist Device  --   with appropriate device  -JW     Gait Training Goal PT STG, Distance to Achieve  200  -JW     Gait Training Goal PT STG, Date Goal Reviewed 09/01/17  -JW      Gait Training Goal PT STG, Outcome goal met  -JW      Stair Training PT STG    Stair Training Goal PT STG, Date Established  08/26/17  -JW     Stair Training Goal PT STG, Time to Achieve  1 wk  -JW     Stair  Training Goal PT STG, Number of Steps  3  -JW     Stair Training Goal PT STG, [place] Level  contact guard assist  -JW     Stair Training Goal PT STG, Assist Device  1 handrail  -JW     Stair Training Goal PT STG, Date Goal Reviewed 09/01/17  -JW      Stair Training Goal PT STG, Outcome goal ongoing  -JW      Patient Education PT STG    Patient Education PT STG, Date Established  08/26/17  -JW     Patient Education PT STG, Time to Achieve  1 wk  -JW     Patient Education PT STG, Education Type  written program;HEP  -JW     Patient Education PT STG, Education Understanding  demonstrate adequately;verbalize understanding  -JW     Patient Education PT STG, Date Goal Reviewed 09/01/17  -JW      Patient Education PT STG Outcome goal met  -JW        User Key  (r) = Recorded By, (t) = Taken By, (c) = Cosigned By    Initials Name Provider Type    LYNNE Edmond, PT Physical Therapist          Physical Therapy Education     Title: PT OT SLP Therapies (Done)     Topic: Physical Therapy (Done)     Point: Mobility training (Done)    Learning Progress Summary    Learner Readiness Method Response Comment Documented by Status   Patient Acceptance E VU Education continued for gait training with rollator and gait up and down steps with 1 rail on right (ascending). She intially did well leading with correct foot going up steps but then needed cueing 2nd time to lead with correct foot. LN 09/04/17 0948 Done    Acceptance E VU Education provided on functional mobility and gait training with rollator and gait up and down 5 steps with 1 rail on right (ascending). Cues needed on first step to lead with correct foot- could use more practice on steps prior to discharge home. LN 09/02/17 1257 Done    Acceptance E VU  JW 09/01/17 1256 Done    Acceptance E VU  JW 08/31/17 1550 Done    Acceptance E,TB,D [facility] 08/31/17 1206 Done    Acceptance E VU  JW 08/30/17 1152 Done    Acceptance E,TB,D,H VU   08/29/17 1517 Done    Acceptance  E,TB,D,H VU continues to require cues with some ex  08/29/17 1000 Done    Acceptance E,TB,D,H VU cues with some ex but pt able to perform easier this pm(and with no physical assist)  08/28/17 1423 Done    Acceptance E,TB,D,H VU,DU pt has written HEP- reviewed with pt- required cues with some ex.  Also discussed pain and benefits of taking pain meds  08/28/17 1045 Done    Acceptance E VU   08/27/17 0933 Done    Acceptance E VU   08/26/17 1024 Done               Point: Home exercise program (Done)    Learning Progress Summary    Learner Readiness Method Response Comment Documented by Status   Patient Acceptance E VU Education continued for gait training with rollator and gait up and down steps with 1 rail on right (ascending). She intially did well leading with correct foot going up steps but then needed cueing 2nd time to lead with correct foot.  09/04/17 0948 Done    Acceptance E,TB,D VU   08/31/17 1206 Done    Acceptance E VU   08/30/17 1152 Done    Acceptance E,TB,D,H VU   08/29/17 1517 Done    Acceptance E,TB,D,H VU continues to require cues with some ex  08/29/17 1000 Done    Acceptance E,TB,D,H VU cues with some ex but pt able to perform easier this pm(and with no physical assist)  08/28/17 1423 Done    Acceptance E,TB,D,H VU,DU pt has written HEP- reviewed with pt- required cues with some ex.  Also discussed pain and benefits of taking pain meds  08/28/17 1045 Done    Acceptance E VU   08/27/17 0933 Done    Acceptance E VU   08/26/17 1024 Done                      User Key     Initials Effective Dates Name Provider Type Discipline     08/11/15 -  Karyn Azul, PTA Physical Therapy Assistant PT     06/22/16 -  Paige Casey, PT Physical Therapist PT     12/01/15 -  Terese Edmond, PT Physical Therapist PT                    PT Recommendation and Plan  Anticipated Discharge Disposition: home with home health  Planned Therapy Interventions: balance training, bed  mobility training, gait training, home exercise program, strengthening, transfer training, patient/family education  PT Frequency: daily  Plan of Care Review  Plan Of Care Reviewed With: patient  Progress: improving  Outcome Summary/Follow up Plan: PT note: Patient ambulated well with rollator with SBA-CGA of 1 400 feet. She practiced stairs again and intially lead with correct foot going up stairs without any cueing but 2nd time needind cueing. Good balance noted with ambulation.  Will see for 1 more visit prior to discharge.          Time Calculation:         PT Charges       09/04/17 0954          Time Calculation    Start Time 0840  -LN      Stop Time 0853  -LN      Time Calculation (min) 13 min  -LN        User Key  (r) = Recorded By, (t) = Taken By, (c) = Cosigned By    Initials Name Provider Type    LN Paige Casey, PT Physical Therapist          Therapy Charges for Today     Code Description Service Date Service Provider Modifiers Qty    45973810819 HC GAIT TRAINING EA 15 MIN 9/4/2017 Paige Casey, PT GP 1               Paige Casey, PT  9/4/2017

## 2017-09-05 LAB
INR PPP: 1.67 (ref 0.9–1.1)
PROTHROMBIN TIME: 19.9 SECONDS (ref 12.1–15)

## 2017-09-05 PROCEDURE — 85610 PROTHROMBIN TIME: CPT | Performed by: FAMILY MEDICINE

## 2017-09-05 PROCEDURE — 97110 THERAPEUTIC EXERCISES: CPT

## 2017-09-05 RX ADMIN — ATORVASTATIN CALCIUM 10 MG: 10 TABLET, FILM COATED ORAL at 08:53

## 2017-09-05 RX ADMIN — FLUOXETINE 20 MG: 20 CAPSULE ORAL at 08:53

## 2017-09-05 RX ADMIN — HYDROCODONE BITARTRATE AND ACETAMINOPHEN 1 TABLET: 7.5; 325 TABLET ORAL at 08:53

## 2017-09-05 RX ADMIN — Medication 1 TABLET: at 08:53

## 2017-09-05 RX ADMIN — WARFARIN SODIUM 2 MG: 2 TABLET ORAL at 17:40

## 2017-09-05 RX ADMIN — HYDROCODONE BITARTRATE AND ACETAMINOPHEN 1 TABLET: 7.5; 325 TABLET ORAL at 20:14

## 2017-09-05 NOTE — PROGRESS NOTES
To be discharged home tomorrow with [facility] for PT, OT, nursing and a home safety evaluation. Appointment with PCP, Dr.Damon Ford on September 13, 2017 at 9:40AM. Appointment with  on October 2, 2017 2:30PM.  No equipment needed. She lives alone.

## 2017-09-05 NOTE — PLAN OF CARE
Problem: Patient Care Overview (Adult)  Goal: Plan of Care Review  Outcome: Ongoing (interventions implemented as appropriate)    09/05/17 1155   Coping/Psychosocial Response Interventions   Plan Of Care Reviewed With patient   Outcome Evaluation   Outcome Summary/Follow up Plan PT: Patient requires supervision for transfers and bed mobility. patient able to perform gait with rollator x400 feet with 1 seated rest break, SBA. Patient able to ascend/descend 5 steps with 1 handrail with initial cues for correct sequencing. Patient has progressed well with physical therapy and reports no concerns regarding return home. WIll discharge from PT at this time.

## 2017-09-05 NOTE — THERAPY DISCHARGE NOTE
"SNF - Physical Therapy Treatment Note/Discharge   Goldie Daniels     Patient Name: Gaby Diaz  : 2/10/1931  MRN: [id number]  Today's Date: 2017  Onset of Illness/Injury or Date of Surgery Date: 17  Date of Referral to PT: 17  Referring Physician: Liane    Admit Date: 2017    Visit Dx:  No diagnosis found.  Patient Active Problem List   Diagnosis   • Atrial fibrillation   • Hyperlipidemia   • Hypertension   • Encounter for rehabilitation                 Adult Rehabilitation Note       17 1015 17 0840       Rehab Assessment/Intervention    Discipline physical therapist  -JW physical therapist  -LN     Document Type therapy note (daily note);discharge summary  -JW therapy note (daily note)  -LN     Subjective Information no complaints;agree to therapy  -JW agree to therapy;no complaints  -LN     Patient Effort, Rehab Treatment good  -JW good  -LN     Symptoms Noted During/After Treatment none  -JW      Precautions/Limitations fall precautions  -JW fall precautions  -LN     Specific Treatment Considerations  \"I get to go home on Wednesday.\"   -LN     Recorded by [JW] Terese Edmond, PT [LN] Paige Casey, PT     Pain Assessment    Pain Assessment No/denies pain  -JW No/denies pain  -LN     Recorded by [JW] Terese dEmond, PT [LN] Paige Casey, PT     Cognitive Assessment/Intervention    Personal Safety Interventions  fall prevention program maintained;gait belt;nonskid shoes/slippers when out of bed  -LN     Recorded by  [LN] Paige Casey, PT     Bed Mobility, Assessment/Treatment    Bed Mob, Sit to Supine, [place] supervision required  -JW      Bed Mobility, Comment deferred-up in chair  -JW deferred- up in chair  -LN     Recorded by [JW] Terese Edmond, PT [LN] Paige Casey, PT     Transfer Assessment/Treatment    Transfers, Sit-Stand [place] supervision required;verbal cues required  -JW supervision required  -LN     " Transfers, Stand-Sit [place] supervision required;verbal cues required  - supervision required  -LN     Transfers, Sit-Stand-Sit, Assist Device other (see comments)   rollator  -JW other (see comments)   rollator  -LN     Transfer, Comment requires increased time to stand from recliner surface  -JW      Recorded by [JW] Terese Edmond, PT [LN] Paige Casey, PT     Gait Assessment/Treatment    Gait, [place] Level stand by assist  -JW stand by assist;supervision required  -LN     Gait, Assistive Device rollator  -JW rollator  -LN     Gait, Distance (Feet) 400   1 seated rest break  -  -LN     Gait, Gait Pattern Analysis swing-through gait  -JW swing-through gait  -LN     Gait, Gait Deviations misael decreased;forward flexed posture;narrow base  -JW misael decreased;forward flexed posture;step length decreased  -LN     Gait, Comment pt requires intermittent verbal cues for proper distance from walker.  patient able to manage walker around external obstacles without balance loss.  -JW      Recorded by [JW] Terese Edmond, PT [LN] Paige Casey, PT     Stairs Assessment/Treatment    Number of Stairs 5  -JW 5  -LN     Stairs, Handrail Location right side (ascending)  -JW right side (ascending)  -LN     Stairs, [place] Level verbal cues required;supervision required  -JW verbal cues required;contact guard assist  -LN     Stairs, Technique Used step to step (ascending);step to step (descending)  -JW step to step (ascending);step to step (descending)  -LN     Stairs, Comment pt requires initial cues for correct technique.   -JW patient initially stepped up with correct leg but 2nd time, needed cueing to lead with the correct leg.   -LN     Recorded by [JW] Terese Edmond, PT [LN] Paige Casey, PT     Therapy Exercises    Bilateral Lower Extremities --   reviewed LE HEP  -JW      Recorded by [JW] Terese Edmond, PT      Positioning and Restraints    Pre-Treatment Position  sitting in chair/recliner  -JW sitting in chair/recliner  -LN     Post Treatment Position bed  -JW chair  -LN     In Bed supine;call light within reach;encouraged to call for assist  -JW      In Chair  call light within reach;encouraged to call for assist;legs elevated  -LN     Recorded by [JW] Terese Edmond, PT [LN] Paige Casey, PT       User Key  (r) = Recorded By, (t) = Taken By, (c) = Cosigned By    Initials Name Effective Dates    LN Paige Casey, PT 06/22/16 -     JW Terese Edmond, PT 12/01/15 -           PT Recommendation and Plan  Anticipated Discharge Disposition: home with home health  Planned Therapy Interventions: balance training, bed mobility training, gait training, home exercise program, strengthening, transfer training, patient/family education  PT Frequency: daily  Plan of Care Review  Plan Of Care Reviewed With: patient  Outcome Summary/Follow up Plan: PT: Patient requires supervision for transfers and bed mobility.  patient able to perform gait with rollator x400 feet with 1 seated rest break, SBA.  Patient able to ascend/descend 5 steps with 1 handrail with initial cues for correct sequencing.  Patient has progressed well with physical therapy and reports no concerns regarding return home.  WIll discharge from PT at this time.         Time Calculation:         PT Charges       09/05/17 1156          Time Calculation    Start Time 1015  -      Stop Time 1035  -      Time Calculation (min) 20 min  -      PT Received On 09/05/17  -        User Key  (r) = Recorded By, (t) = Taken By, (c) = Cosigned By    Initials Name Provider Type    LYNNE Edmond PT Physical Therapist          Therapy Charges for Today     Code Description Service Date Service Provider Modifiers Qty    [id number]  PT THER PROC EA 15 MIN 9/5/2017 Terese Edmond PT GP 1               PT Discharge Summary  Anticipated Discharge Disposition: home with home health  Reason for Discharge: All goals  achieved, Maximum functional potential achieved  Outcomes Achieved: Able to achieve all goals within established timeline  Discharge Destination: Home with home health  Patient able to perform LE HEP independently and reports no concerns regarding return home.  Patient met all therapy goals at time of discharge and able to perform all functional mobility with supervision and use of rollator.  Recommend continued use of rollator and home health PT at discharge.  Terese Edmond, PT  9/5/2017

## 2017-09-06 VITALS
SYSTOLIC BLOOD PRESSURE: 111 MMHG | BODY MASS INDEX: 25.64 KG/M2 | RESPIRATION RATE: 18 BRPM | HEART RATE: 77 BPM | WEIGHT: 150.2 LBS | OXYGEN SATURATION: 96 % | TEMPERATURE: 97.1 F | HEIGHT: 64 IN | DIASTOLIC BLOOD PRESSURE: 62 MMHG

## 2017-09-06 LAB
INR PPP: 2.06 (ref 0.9–1.1)
PROTHROMBIN TIME: 23.5 SECONDS (ref 12.1–15)

## 2017-09-06 PROCEDURE — 85610 PROTHROMBIN TIME: CPT | Performed by: FAMILY MEDICINE

## 2017-09-06 RX ORDER — FERROUS SULFATE 325(65) MG
325 TABLET ORAL
Qty: 30 TABLET | Refills: 2 | Status: SHIPPED | OUTPATIENT
Start: 2017-09-06 | End: 2020-02-21 | Stop reason: ALTCHOICE

## 2017-09-06 RX ORDER — SENNA AND DOCUSATE SODIUM 50; 8.6 MG/1; MG/1
2 TABLET, FILM COATED ORAL 2 TIMES DAILY
Qty: 30 TABLET | Refills: 0 | Status: SHIPPED | OUTPATIENT
Start: 2017-09-06 | End: 2020-09-18

## 2017-09-06 RX ORDER — NITROGLYCERIN 6.5 MG/1
6.5 CAPSULE ORAL DAILY
Qty: 30 CAPSULE
Start: 2017-09-06 | End: 2020-02-21 | Stop reason: ALTCHOICE

## 2017-09-06 RX ORDER — WARFARIN SODIUM 1 MG/1
TABLET ORAL
Qty: 60 TABLET | Refills: 0 | Status: SHIPPED | OUTPATIENT
Start: 2017-09-06 | End: 2020-02-21 | Stop reason: ALTCHOICE

## 2017-09-06 RX ORDER — HYDROCODONE BITARTRATE AND ACETAMINOPHEN 5; 325 MG/1; MG/1
1 TABLET ORAL EVERY 6 HOURS PRN
Qty: 15 TABLET
Start: 2017-09-06 | End: 2020-02-21 | Stop reason: ALTCHOICE

## 2017-09-06 RX ADMIN — DOCUSATE SODIUM AND SENNOSIDES 2 TABLET: 8.6; 5 TABLET, FILM COATED ORAL at 08:56

## 2017-09-06 RX ADMIN — ATORVASTATIN CALCIUM 10 MG: 10 TABLET, FILM COATED ORAL at 08:56

## 2017-09-06 RX ADMIN — Medication 1 TABLET: at 08:56

## 2017-09-06 RX ADMIN — FLUOXETINE 20 MG: 20 CAPSULE ORAL at 08:56

## 2017-09-06 NOTE — PLAN OF CARE
Problem: Skin Integrity Impairment, Risk/Actual (Adult)  Goal: Identify Related Risk Factors and Signs and Symptoms  Outcome: Ongoing (interventions implemented as appropriate)    09/06/17 1421   Skin Integrity Impairment, Risk/Actual   Skin Integrity Impairment, Risk/Actual: Related Risk Factors age extremes       Goal: Skin Integrity/Wound Healing  Outcome: Ongoing (interventions implemented as appropriate)    09/06/17 1421   Skin Integrity Impairment, Risk/Actual (Adult)   Skin Integrity/Wound Healing making progress toward outcome         Problem: Fall Risk (Adult)  Goal: Identify Related Risk Factors and Signs and Symptoms  Outcome: Ongoing (interventions implemented as appropriate)    09/06/17 1421   Fall Risk   Fall Risk: Related Risk Factors age-related changes   Fall Risk: Signs and Symptoms presence of risk factors       Goal: Absence of Falls  Outcome: Ongoing (interventions implemented as appropriate)    09/06/17 1421   Fall Risk (Adult)   Absence of Falls making progress toward outcome         Problem: Pain, Acute (Adult)  Goal: Identify Related Risk Factors and Signs and Symptoms  Outcome: Ongoing (interventions implemented as appropriate)    09/06/17 1421   Pain, Acute   Related Risk Factors (Acute Pain) surgery       Goal: Acceptable Pain Control/Comfort Level  Outcome: Ongoing (interventions implemented as appropriate)    09/06/17 1421   Pain, Acute (Adult)   Acceptable Pain Control/Comfort Level making progress toward outcome         Problem: Patient Care Overview (Adult)  Goal: Plan of Care Review  Outcome: Ongoing (interventions implemented as appropriate)    09/06/17 1421   Coping/Psychosocial Response Interventions   Plan Of Care Reviewed With patient   Patient Care Overview   Progress improving

## 2017-09-06 NOTE — DISCHARGE SUMMARY
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          Operations and Procedures Performed:       No results found.    Allergies:  has No Known Allergies.    Naren  reviewed    Discharge Medications:   Gaby Diaz   Home Medication Instructions JONNY:[id number]    Printed on:09/06/17 2872   Medication Information                      chlordiazePOXIDE (LIBRIUM) 5 MG capsule  Take 5 mg by mouth 3 (Three) Times a Day As Needed for Anxiety.             FLUoxetine (PROzac) 20 MG capsule  Take  by mouth daily.             HYDROcodone-acetaminophen (NORCO) 7.5-325 MG per tablet  Take 1-2 tablets by mouth Every 4 (Four) Hours As Needed for Moderate Pain .             Multiple Vitamins-Minerals (MULTIVITAMIN WITH MINERALS) tablet tablet  Take 1 tablet by mouth Daily.             nitroglycerin (NITROSTAT) 0.4 MG SL tablet  Place  under the tongue.             sennosides-docusate sodium (SENOKOT-S) 8.6-50 MG tablet  Take 2 tablets by mouth 2 (Two) Times a Day.             simvastatin (ZOCOR) 20 MG tablet  Take  by mouth.             warfarin (COUMADIN) 1 MG tablet  2 mg daily                 History of Present Illness:  86-year-old white female with end-stage arthritis of right hip failed conservative management underwent a right hip replacement on 8/23/2017 at [facility] is being admitted here for rehabilitation [facility]  [place] skilled rehabilitation unit.  Her postoperative course was uneventful    Hospital Course     Patient admitted to [facility] rehabilitation unit.  She received PT and OT she progressed well she is amenable hip pain during the initial prior to admission but this premature resolved by the time patient was discharged.  She was initially on left acetabular admission she was transitioned to by mouth Coumadin for chronic mechanical ventilation for her atrial fibrillation she was therapeutic as far as her PT/INR time of discharge.    She was anemic iron studies confirm a large she is given some iron infusions initially and then started on by mouth iron which was tolerated well.      To be discharged home with [facility] for PT, OT, nursing and a home safety evaluation. Appointment with PCP, Dr.Damon Ford on September 13, 2017 at 9:40AM. Appointment with  on October 2, 2017 2:30PM.  No equipment needed. She lives alone.                PROCEDURES      Condition on Discharge:  stable  Discharge Disposition  home    Visiting Nurse:    Yes     Home PT/OT:  Yes     Home Safety Evaluation:  Yes     DME  none    Discharge Diet:           Dietary Orders            Start     Ordered    08/25/17 1553  Diet Regular; Cardiac  Diet Effective Now     Question Answer Comment   Diet Texture / Consistency Regular    Common Modifiers Cardiac        08/25/17 1552          Activity at Discharge:  As tolerate    Pre-discharge education  Wound Care      Follow-up Appointments  Future Appointments  Date Time Provider Department Center   2/2/2018 12:45 PM Chana Jarvis MD [facility] None         Test Results Pending at Discharge       Jayro Rob MD  09/06/17  1:25 PM

## 2017-09-06 NOTE — PLAN OF CARE
Problem: Skin Integrity Impairment, Risk/Actual (Adult)  Goal: Identify Related Risk Factors and Signs and Symptoms  Outcome: Ongoing (interventions implemented as appropriate)  Goal: Skin Integrity/Wound Healing  Outcome: Ongoing (interventions implemented as appropriate)    Problem: Fall Risk (Adult)  Goal: Identify Related Risk Factors and Signs and Symptoms  Outcome: Ongoing (interventions implemented as appropriate)  Goal: Absence of Falls  Outcome: Ongoing (interventions implemented as appropriate)    Problem: Pain, Acute (Adult)  Goal: Identify Related Risk Factors and Signs and Symptoms  Outcome: Ongoing (interventions implemented as appropriate)  Goal: Acceptable Pain Control/Comfort Level  Outcome: Ongoing (interventions implemented as appropriate)    Problem: Patient Care Overview (Adult)  Goal: Plan of Care Review  Outcome: Ongoing (interventions implemented as appropriate)

## 2018-03-12 ENCOUNTER — OFFICE VISIT (OUTPATIENT)
Dept: CARDIOLOGY | Facility: CLINIC | Age: 83
End: 2018-03-12

## 2018-03-12 VITALS
DIASTOLIC BLOOD PRESSURE: 68 MMHG | WEIGHT: 142.4 LBS | HEIGHT: 61 IN | HEART RATE: 74 BPM | SYSTOLIC BLOOD PRESSURE: 110 MMHG | BODY MASS INDEX: 26.88 KG/M2

## 2018-03-12 DIAGNOSIS — I48.20 CHRONIC ATRIAL FIBRILLATION (HCC): Primary | ICD-10-CM

## 2018-03-12 DIAGNOSIS — I10 ESSENTIAL HYPERTENSION: ICD-10-CM

## 2018-03-12 DIAGNOSIS — E78.5 HYPERLIPIDEMIA, UNSPECIFIED HYPERLIPIDEMIA TYPE: ICD-10-CM

## 2018-03-12 PROBLEM — Z51.89 ENCOUNTER FOR REHABILITATION: Status: RESOLVED | Noted: 2017-08-25 | Resolved: 2018-03-12

## 2018-03-12 PROCEDURE — 99214 OFFICE O/P EST MOD 30 MIN: CPT | Performed by: INTERNAL MEDICINE

## 2018-03-12 PROCEDURE — 93000 ELECTROCARDIOGRAM COMPLETE: CPT | Performed by: INTERNAL MEDICINE

## 2018-03-12 RX ORDER — LISINOPRIL 5 MG/1
1 TABLET ORAL DAILY
COMMUNITY
Start: 2018-02-21 | End: 2020-02-21 | Stop reason: ALTCHOICE

## 2018-03-12 RX ORDER — WARFARIN SODIUM 5 MG/1
1 TABLET ORAL DAILY
COMMUNITY
Start: 2018-02-21 | End: 2020-02-21 | Stop reason: ALTCHOICE
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
## 2018-09-28 NOTE — PLAN OF CARE
Problem: Inpatient Physical Therapy  Goal: Bed Mobility Goal STG- PT  Outcome: Outcome(s) achieved Date Met:  09/05/17 08/26/17 1025 09/05/17 1132   Bed Mobility PT STG   Bed Mobility PT STG, Date Established 08/26/17 --    Bed Mobility PT STG, Time to Achieve 1 wk --    Bed Mobility PT STG, Activity Type all bed mobility --    Bed Mobility PT STG, Schleicher Level supervision required --    Bed Mobility PT STG, Date Goal Reviewed --  09/05/17   Bed Mobility PT STG, Outcome --  goal met       Goal: Stair Training Goal STG- PT  Outcome: Outcome(s) achieved Date Met:  09/05/17 08/26/17 1025 09/05/17 1132   Stair Training PT STG   Stair Training Goal PT STG, Date Established 08/26/17 --    Stair Training Goal PT STG, Time to Achieve 1 wk --    Stair Training Goal PT STG, Number of Steps 3 --    Stair Training Goal PT STG, Schleicher Level contact guard assist --    Stair Training Goal PT STG, Assist Device 1 handrail --    Stair Training Goal PT STG, Date Goal Reviewed --  09/05/17   Stair Training Goal PT STG, Outcome --  goal met            Consent: The patient's consent was obtained including but not limited to risks of crusting, scabbing, blistering, scarring, darker or lighter pigmentary change, recurrence, incomplete removal and infection. Add 52 Modifier (Optional): no Medical Necessity Clause: This procedure was medically necessary because the lesions that were treated were: Post-Care Instructions: I reviewed with the patient in detail post-care instructions. Patient is to wear sunprotection, and avoid picking at any of the treated lesions. Pt may apply Vaseline to crusted or scabbing areas. Detail Level: Detailed Medical Necessity Information: It is in your best interest to select a reason for this procedure from the list below. All of these items fulfill various CMS LCD requirements except the new and changing color options. Duration Of Freeze Thaw-Cycle (Seconds): 0

## 2018-10-01 ENCOUNTER — OFFICE VISIT (OUTPATIENT)
Dept: CARDIOLOGY | Facility: CLINIC | Age: 83
End: 2018-10-01

## 2018-10-01 VITALS
SYSTOLIC BLOOD PRESSURE: 120 MMHG | HEIGHT: 61 IN | DIASTOLIC BLOOD PRESSURE: 88 MMHG | HEART RATE: 78 BPM | BODY MASS INDEX: 27 KG/M2 | WEIGHT: 143 LBS

## 2018-10-01 DIAGNOSIS — E78.5 HYPERLIPIDEMIA, UNSPECIFIED HYPERLIPIDEMIA TYPE: ICD-10-CM

## 2018-10-01 DIAGNOSIS — I10 ESSENTIAL HYPERTENSION: ICD-10-CM

## 2018-10-01 DIAGNOSIS — I48.20 CHRONIC ATRIAL FIBRILLATION (HCC): Primary | ICD-10-CM

## 2018-10-01 PROCEDURE — 99214 OFFICE O/P EST MOD 30 MIN: CPT | Performed by: INTERNAL MEDICINE

## 2018-10-01 PROCEDURE — 93000 ELECTROCARDIOGRAM COMPLETE: CPT | Performed by: INTERNAL MEDICINE
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
## 2020-01-13 ENCOUNTER — APPOINTMENT (OUTPATIENT)
Dept: GENERAL RADIOLOGY | Facility: HOSPITAL | Age: 85
End: 2020-01-13

## 2020-01-13 ENCOUNTER — HOSPITAL ENCOUNTER (EMERGENCY)
Facility: HOSPITAL | Age: 85
Discharge: ANOTHER HOSPITAL | End: 2020-01-14
Attending: EMERGENCY MEDICINE | Admitting: EMERGENCY MEDICINE

## 2020-01-13 DIAGNOSIS — I21.4 NSTEMI (NON-ST ELEVATED MYOCARDIAL INFARCTION) (HCC): Primary | ICD-10-CM

## 2020-01-13 LAB
ALBUMIN SERPL-MCNC: 4.2 G/DL (ref 3.5–5.2)
ALBUMIN/GLOB SERPL: 1.5 G/DL
ALP SERPL-CCNC: 71 U/L (ref 39–117)
ALT SERPL W P-5'-P-CCNC: 11 U/L (ref 1–33)
ANION GAP SERPL CALCULATED.3IONS-SCNC: 14.8 MMOL/L (ref 5–15)
APTT PPP: 26.3 SECONDS (ref 24.3–38.1)
AST SERPL-CCNC: 21 U/L (ref 1–32)
BASOPHILS # BLD AUTO: 0.03 10*3/MM3 (ref 0–0.2)
BASOPHILS NFR BLD AUTO: 0.3 % (ref 0–1.5)
BILIRUB SERPL-MCNC: 0.3 MG/DL (ref 0.2–1.2)
BUN BLD-MCNC: 27 MG/DL (ref 8–23)
BUN/CREAT SERPL: 23.7 (ref 7–25)
CALCIUM SPEC-SCNC: 8.9 MG/DL (ref 8.6–10.5)
CHLORIDE SERPL-SCNC: 101 MMOL/L (ref 98–107)
CO2 SERPL-SCNC: 24.2 MMOL/L (ref 22–29)
CREAT BLD-MCNC: 1.14 MG/DL (ref 0.57–1)
DEPRECATED RDW RBC AUTO: 51.8 FL (ref 37–54)
EOSINOPHIL # BLD AUTO: 0.03 10*3/MM3 (ref 0–0.4)
EOSINOPHIL NFR BLD AUTO: 0.3 % (ref 0.3–6.2)
ERYTHROCYTE [DISTWIDTH] IN BLOOD BY AUTOMATED COUNT: 13.8 % (ref 12.3–15.4)
FLUAV AG NPH QL: NEGATIVE
FLUBV AG NPH QL IA: NEGATIVE
GFR SERPL CREATININE-BSD FRML MDRD: 45 ML/MIN/1.73
GLOBULIN UR ELPH-MCNC: 2.8 GM/DL
GLUCOSE BLD-MCNC: 139 MG/DL (ref 65–99)
HCT VFR BLD AUTO: 37.4 % (ref 34–46.6)
HGB BLD-MCNC: 11.9 G/DL (ref 12–15.9)
HOLD SPECIMEN: NORMAL
HOLD SPECIMEN: NORMAL
IMM GRANULOCYTES # BLD AUTO: 0.05 10*3/MM3 (ref 0–0.05)
IMM GRANULOCYTES NFR BLD AUTO: 0.5 % (ref 0–0.5)
INR PPP: 1.09 (ref 0.9–1.1)
LIPASE SERPL-CCNC: 40 U/L (ref 13–60)
LYMPHOCYTES # BLD AUTO: 1.61 10*3/MM3 (ref 0.7–3.1)
LYMPHOCYTES NFR BLD AUTO: 15.7 % (ref 19.6–45.3)
MCH RBC QN AUTO: 32.2 PG (ref 26.6–33)
MCHC RBC AUTO-ENTMCNC: 31.8 G/DL (ref 31.5–35.7)
MCV RBC AUTO: 101.4 FL (ref 79–97)
MONOCYTES # BLD AUTO: 0.64 10*3/MM3 (ref 0.1–0.9)
MONOCYTES NFR BLD AUTO: 6.2 % (ref 5–12)
NEUTROPHILS # BLD AUTO: 7.92 10*3/MM3 (ref 1.7–7)
NEUTROPHILS NFR BLD AUTO: 77 % (ref 42.7–76)
NRBC BLD AUTO-RTO: 0 /100 WBC (ref 0–0.2)
NT-PROBNP SERPL-MCNC: 2006 PG/ML (ref 5–1800)
PLATELET # BLD AUTO: 208 10*3/MM3 (ref 140–450)
PMV BLD AUTO: 10.5 FL (ref 6–12)
POTASSIUM BLD-SCNC: 4.3 MMOL/L (ref 3.5–5.2)
PROT SERPL-MCNC: 7 G/DL (ref 6–8.5)
PROTHROMBIN TIME: 13.8 SECONDS (ref 12.1–15)
RBC # BLD AUTO: 3.69 10*6/MM3 (ref 3.77–5.28)
SODIUM BLD-SCNC: 140 MMOL/L (ref 136–145)
TROPONIN T SERPL-MCNC: 0.19 NG/ML (ref 0–0.03)
TROPONIN T SERPL-MCNC: 0.28 NG/ML (ref 0–0.03)
WBC NRBC COR # BLD: 10.28 10*3/MM3 (ref 3.4–10.8)
WHOLE BLOOD HOLD SPECIMEN: NORMAL
WHOLE BLOOD HOLD SPECIMEN: NORMAL

## 2020-01-13 PROCEDURE — 87804 INFLUENZA ASSAY W/OPTIC: CPT | Performed by: EMERGENCY MEDICINE

## 2020-01-13 PROCEDURE — 94640 AIRWAY INHALATION TREATMENT: CPT

## 2020-01-13 PROCEDURE — 80053 COMPREHEN METABOLIC PANEL: CPT | Performed by: EMERGENCY MEDICINE

## 2020-01-13 PROCEDURE — 85025 COMPLETE CBC W/AUTO DIFF WBC: CPT | Performed by: EMERGENCY MEDICINE

## 2020-01-13 PROCEDURE — 93010 ELECTROCARDIOGRAM REPORT: CPT | Performed by: INTERNAL MEDICINE

## 2020-01-13 PROCEDURE — 84484 ASSAY OF TROPONIN QUANT: CPT | Performed by: EMERGENCY MEDICINE

## 2020-01-13 PROCEDURE — 85610 PROTHROMBIN TIME: CPT | Performed by: PHYSICIAN ASSISTANT

## 2020-01-13 PROCEDURE — 83690 ASSAY OF LIPASE: CPT | Performed by: PHYSICIAN ASSISTANT

## 2020-01-13 PROCEDURE — 71046 X-RAY EXAM CHEST 2 VIEWS: CPT

## 2020-01-13 PROCEDURE — 25010000002 ONDANSETRON PER 1 MG: Performed by: EMERGENCY MEDICINE

## 2020-01-13 PROCEDURE — 85730 THROMBOPLASTIN TIME PARTIAL: CPT | Performed by: PHYSICIAN ASSISTANT

## 2020-01-13 PROCEDURE — 84484 ASSAY OF TROPONIN QUANT: CPT | Performed by: PHYSICIAN ASSISTANT

## 2020-01-13 PROCEDURE — 93005 ELECTROCARDIOGRAM TRACING: CPT | Performed by: EMERGENCY MEDICINE

## 2020-01-13 PROCEDURE — 99284 EMERGENCY DEPT VISIT MOD MDM: CPT

## 2020-01-13 PROCEDURE — 83880 ASSAY OF NATRIURETIC PEPTIDE: CPT | Performed by: PHYSICIAN ASSISTANT

## 2020-01-13 PROCEDURE — 99285 EMERGENCY DEPT VISIT HI MDM: CPT | Performed by: EMERGENCY MEDICINE

## 2020-01-13 PROCEDURE — 96374 THER/PROPH/DIAG INJ IV PUSH: CPT

## 2020-01-13 PROCEDURE — 25010000002 ENOXAPARIN PER 10 MG: Performed by: PHYSICIAN ASSISTANT

## 2020-01-13 PROCEDURE — 96372 THER/PROPH/DIAG INJ SC/IM: CPT

## 2020-01-13 RX ORDER — ASPIRIN 325 MG
325 TABLET ORAL ONCE
Status: COMPLETED | OUTPATIENT
Start: 2020-01-13 | End: 2020-01-13

## 2020-01-13 RX ORDER — ALBUTEROL SULFATE 2.5 MG/3ML
2.5 SOLUTION RESPIRATORY (INHALATION) ONCE
Status: COMPLETED | OUTPATIENT
Start: 2020-01-13 | End: 2020-01-13

## 2020-01-13 RX ORDER — IPRATROPIUM BROMIDE AND ALBUTEROL SULFATE 2.5; .5 MG/3ML; MG/3ML
3 SOLUTION RESPIRATORY (INHALATION) ONCE
Status: COMPLETED | OUTPATIENT
Start: 2020-01-14 | End: 2020-01-14

## 2020-01-13 RX ORDER — ONDANSETRON 2 MG/ML
4 INJECTION INTRAMUSCULAR; INTRAVENOUS ONCE
Status: COMPLETED | OUTPATIENT
Start: 2020-01-13 | End: 2020-01-13

## 2020-01-13 RX ORDER — SODIUM CHLORIDE 0.9 % (FLUSH) 0.9 %
10 SYRINGE (ML) INJECTION AS NEEDED
Status: DISCONTINUED | OUTPATIENT
Start: 2020-01-13 | End: 2020-01-14 | Stop reason: HOSPADM

## 2020-01-13 RX ADMIN — METOPROLOL TARTRATE 25 MG: 25 TABLET ORAL at 22:11

## 2020-01-13 RX ADMIN — ASPIRIN 325 MG: 325 TABLET ORAL at 22:11

## 2020-01-13 RX ADMIN — ONDANSETRON 4 MG: 2 INJECTION, SOLUTION INTRAMUSCULAR; INTRAVENOUS at 21:47

## 2020-01-13 RX ADMIN — ALBUTEROL SULFATE 2.5 MG: 2.5 SOLUTION RESPIRATORY (INHALATION) at 23:35

## 2020-01-13 RX ADMIN — ENOXAPARIN SODIUM 60 MG: 60 INJECTION SUBCUTANEOUS at 22:12

## 2020-01-13 RX ADMIN — NITROGLYCERIN 0.5 INCH: 20 OINTMENT TOPICAL at 22:51

## 2020-01-14 VITALS
TEMPERATURE: 98.7 F | HEART RATE: 110 BPM | BODY MASS INDEX: 23.9 KG/M2 | HEIGHT: 62 IN | SYSTOLIC BLOOD PRESSURE: 98 MMHG | WEIGHT: 129.9 LBS | OXYGEN SATURATION: 97 % | DIASTOLIC BLOOD PRESSURE: 65 MMHG | RESPIRATION RATE: 16 BRPM

## 2020-01-14 LAB — TROPONIN T SERPL-MCNC: 0.55 NG/ML (ref 0–0.03)

## 2020-01-14 PROCEDURE — 96375 TX/PRO/DX INJ NEW DRUG ADDON: CPT

## 2020-01-14 PROCEDURE — 94799 UNLISTED PULMONARY SVC/PX: CPT

## 2020-01-14 PROCEDURE — 25010000002 FUROSEMIDE PER 20 MG: Performed by: EMERGENCY MEDICINE

## 2020-01-14 PROCEDURE — 84484 ASSAY OF TROPONIN QUANT: CPT | Performed by: PHYSICIAN ASSISTANT

## 2020-01-14 RX ORDER — CHLORTHALIDONE 25 MG/1
25 TABLET ORAL DAILY
COMMUNITY
End: 2020-02-21 | Stop reason: ALTCHOICE

## 2020-01-14 RX ORDER — FUROSEMIDE 10 MG/ML
40 INJECTION INTRAMUSCULAR; INTRAVENOUS ONCE
Status: COMPLETED | OUTPATIENT
Start: 2020-01-14 | End: 2020-01-14

## 2020-01-14 RX ADMIN — IPRATROPIUM BROMIDE AND ALBUTEROL SULFATE 3 ML: .5; 3 SOLUTION RESPIRATORY (INHALATION) at 00:10

## 2020-01-14 RX ADMIN — SODIUM CHLORIDE 250 ML: 9 INJECTION, SOLUTION INTRAVENOUS at 03:03

## 2020-01-14 RX ADMIN — FUROSEMIDE 40 MG: 10 INJECTION, SOLUTION INTRAMUSCULAR; INTRAVENOUS at 01:03

## 2020-01-14 NOTE — ED PROVIDER NOTES
Subjective   History of Present Illness    Review of Systems    Past Medical History:   Diagnosis Date   • Arthritis of back    • Arthritis of neck    • Atrial fibrillation (CMS/HCC)    • Hip arthrosis    • Hyperlipidemia    • Hypertension    • Knee swelling    • Myocardial ischemia     inferior wall   • Stroke syndrome        No Known Allergies    Past Surgical History:   Procedure Laterality Date   • HIP SURGERY     • REPLACEMENT TOTAL KNEE Bilateral        Family History   Problem Relation Age of Onset   • Hypertension Mother    • Heart attack Father    • Heart attack Brother    • Hypertension Brother    • Stroke Brother    • Heart attack Daughter        Social History     Socioeconomic History   • Marital status:      Spouse name: Not on file   • Number of children: Not on file   • Years of education: Not on file   • Highest education level: Not on file   Tobacco Use   • Smoking status: Never Smoker   • Smokeless tobacco: Never Used   Substance and Sexual Activity   • Alcohol use: No   • Drug use: No           Objective   Physical Exam    Procedures           ED Course  ED Course as of Jan 14 0113 Mon Jan 13, 2020 2045 EKG         EKG time / Interpretation time: 2033/2040  Rhythm/Rate: Atrial fibrillation, 119   AK:   QRS, axis: -24  QTc 466  ST and T waves: There is no ST elevation or depression appreciated, no T wave inversions.  EKG Tracing Interpreted Contemporaneously by me, independently viewed by me and MD.   T waves in leads V2 and V3 are now upright compared to EKG on 10/01/2018.      [KS]   2210 CONSULT  Time 2210  Discussed case with Dr Rowan, cardiology.  Reviewed history, exam, results and treatments.  Discussed concerns and plan of care. Dr Rowan advises that we give patient Lovenox, and 25 mg of metoprolol here, and transferred to [facility] for further work-up and treatment.      [KS]   2215 Patient has been updated with the results of her labs and imaging.   I discussed that I have already spoken with cardiology and the plan is to transfer patient to [facility] for NSTEMI.  Patient is agreeable with this plan and verbalizes understanding.    [KS]   2240 CONSULT  Time 2240  Discussed case with Dr Eisenberg, hospitalist at [facility].  Reviewed history, exam, results and treatments.  Discussed concerns and plan of care. Dr Eisenberg accepts pt to be admitted to [facility], however he states they are currently holding patients in the ED and it may be until tomorrow morning before a telemetry bed is ready.      [KS]   2255 Care handed over to Dr. Talbot.    [KS]   2337 Patient complaining of shortness of breath.  This developed while in the emergency room.  Oxygen saturations on room air dropped into the 80s.  Placed on nasal cannula 2 L/min.  Saturations improved slightly.  Saturations turned up to 4 L a minute.  Sats are now returned to the low 90s.  Patient has wheeze bilateral all lung fields.  Giving albuterol mini neb treatment.    [SS]   2342 Patient feeling better.  She has increased air movement.  Wheezing slightly louder now.  Will give additional mini neb treatment and approximately 20 minutes.    [SS]   Tue Jan 14, 2020   0104 Patient still has expiratory wheeze although air movement improved.  She is feeling better after 2 mini neb treatments.  Unfortunately there are no beds available at [facility] either telemetry or ICU.  I have spoken with Dr. Jimena Hearn.  He nor I feel that patient should be hospitalized here.  Patient will require cardiac intervention in the near future.  Thus we are trying to transfer to [place].  I suspect patient's cardiac asthma is due to developing CHF.  Have discussed with Dr. Jimena Hearn.  Given patient Lasix 40 mg IV.Have discussed at length with patient and her daughter all results, issues with transferring the patient to [facility].  Unfortunately no beds available at [place]  [place].  We have a call out to [facility].I have discussed with and care transferred to Dr. Saman Li.      [SS]   0108 Patient's repeat troponin 0.277.  Obviously increasing from prior.    [SS]      ED Course User Index  [KS] Eva Cervantes PA-C  [SS] Trent Talbot MD      12:59 AM, 01/14/20:  There are no beds or ICU beds at [facility].  This patient has a non-STEMI and developed low sats with wheezing with possible cardiac asthma.  Patient will be transferred to [facility] ICU.    1:15 AM, 01/14/20:  I spoke with Erica, the practitioner on for Dr. Chicas, she will accept the patient at [facility].                                         MDM    Final diagnoses:   NSTEMI (non-ST elevated myocardial infarction) (CMS/[facility])            Elieser Li MD  01/14/20 0324

## 2020-01-14 NOTE — ED NOTES
"Pt presents s/p trip and fall at home with c/o generalized malaise and chest tightness afterwards. Pt had to have EMS come to the house to help her up off of the floor after her fall. She says she tripped while walking on a barn floor and had difficulty getting back up. She denies any injury or pain. Denies hitting her head. Her daughter states afterwards she started c/o heart \"fluttering\" and tightness as well as general malaise all day. Pt states she took NTG x2 at home. But states they were  and doesn't know if they did anything. Pt is A&Ox4, able to transfer from  to [facility] with minimal assistance. Denies any pain, SOB. Reports nausea and dry heaving. Denies abd pain  Or any urinary complaints. Daughter at bedside.      Tiffanie Francis, RN  [phone]       Tiffanie Francis RN  [phone]    "

## 2020-01-14 NOTE — ED PROVIDER NOTES
EMERGENCY DEPARTMENT ENCOUNTER      Room Number: 6/06    History is provided by the patient, no translation services needed    HPI:    Chief complaint: Fatigue, palpitations, nausea, epigastric discomfort    Location: Epigastric region radiating to chest    Quality/Severity: Mild at this time    Timing/Duration: Patient had a ground-level fall at 1700, epigastric pain, vomiting, and palpitations began around 1800.    Modifying Factors: Patient accompanied by her daughter.  She had 2 sublingual nitro prior to arrival.    Associated Symptoms: Positive for fatigue, palpitations, epigastric pain, nausea.    Narrative: Pt is a 88 y.o. female who presents complaining of the above symptoms.  Patient states she had a ground-level fall while walking around her barn around 1700 this evening, she states she was feeling fine up until then, and had a mechanical fall in which she fell to her knees.  She states she was unable to get up due to stiffness in her knees bilaterally, she was assisted by EMS for lift assist.  She states about an hour later she began having epigastric discomfort and a couple of episodes of vomiting.  She states she also began having palpitations.  Her epigastric pain radiates to her chest, she denies any shortness of breath, or leg pain or swelling.  She also denies any cough, fever, chills, or urinary complaints.      PMD: Arsh Ford MD    REVIEW OF SYSTEMS  Review of Systems   Constitutional: Positive for fatigue. Negative for chills and fever.   Respiratory: Negative for cough and shortness of breath.    Cardiovascular: Positive for chest pain (Epigastric radiating to chest) and palpitations. Negative for leg swelling.   Gastrointestinal: Positive for abdominal pain (Epigastric) and nausea. Negative for vomiting.   Genitourinary: Negative for difficulty urinating and dysuria.   Musculoskeletal: Negative for arthralgias and myalgias.   Skin: Negative for rash and wound.   Neurological: Negative  for dizziness and syncope.   Psychiatric/Behavioral: Negative for confusion. The patient is not nervous/anxious.          PAST MEDICAL HISTORY  Active Ambulatory Problems     Diagnosis Date Noted   • Atrial fibrillation (CMS/HCC) 07/15/2016   • Hyperlipidemia 07/15/2016   • Hypertension 07/15/2016     Resolved Ambulatory Problems     Diagnosis Date Noted   • Encounter for rehabilitation 08/25/2017     Past Medical History:   Diagnosis Date   • Arthritis of back    • Arthritis of neck    • Hip arthrosis    • Knee swelling    • Myocardial ischemia    • Stroke syndrome        PAST SURGICAL HISTORY  Past Surgical History:   Procedure Laterality Date   • HIP SURGERY     • REPLACEMENT TOTAL KNEE Bilateral        FAMILY HISTORY  Family History   Problem Relation Age of Onset   • Hypertension Mother    • Heart attack Father    • Heart attack Brother    • Hypertension Brother    • Stroke Brother    • Heart attack Daughter        SOCIAL HISTORY  Social History     Socioeconomic History   • Marital status:      Spouse name: Not on file   • Number of children: Not on file   • Years of education: Not on file   • Highest education level: Not on file   Tobacco Use   • Smoking status: Never Smoker   • Smokeless tobacco: Never Used   Substance and Sexual Activity   • Alcohol use: No   • Drug use: No       ALLERGIES  Patient has no known allergies.    No current facility-administered medications for this encounter.     Current Outpatient Medications:   •  chlorthalidone (HYGROTON) 25 MG tablet, Take 25 mg by mouth Daily., Disp: , Rfl:   •  ferrous sulfate (FERROUSUL) 325 (65 FE) MG tablet, Take 1 tablet by mouth Daily With Breakfast., Disp: 30 tablet, Rfl: 2  •  FLUoxetine (PROzac) 20 MG capsule, Take  by mouth daily., Disp: , Rfl:   •  HYDROcodone-acetaminophen (NORCO) 5-325 MG per tablet, Take 1 tablet by mouth Every 6 (Six) Hours As Needed for Moderate Pain ., Disp: 15 tablet, Rfl:   •  lisinopril (PRINIVIL,ZESTRIL) 5 MG  tablet, Take 1 tablet by mouth Daily., Disp: , Rfl:   •  nitroglycerin (NITRO-BID) 6.5 MG CR capsule, Take 1 capsule by mouth Daily., Disp: 30 capsule, Rfl:   •  sennosides-docusate sodium (SENOKOT-S) 8.6-50 MG tablet, Take 2 tablets by mouth 2 (Two) Times a Day. (Patient taking differently: Take 2 tablets by mouth 2 (Two) Times a Day As Needed.), Disp: 30 tablet, Rfl: 0  •  simvastatin (ZOCOR) 20 MG tablet, Take  by mouth., Disp: , Rfl:   •  warfarin (COUMADIN) 1 MG tablet, 2 mg daily, Disp: 60 tablet, Rfl: 0  •  warfarin (COUMADIN) 5 MG tablet, Take 1 tablet by mouth Daily., Disp: , Rfl:     PHYSICAL EXAM  ED Triage Vitals   Temp Heart Rate Resp BP SpO2   01/13/20 2123 01/13/20 2030 01/13/20 2030 01/13/20 2030 01/13/20 2030   98.7 °F (37.1 °C) 109 18 132/92 96 %      Temp src Heart Rate Source Patient Position BP Location FiO2 (%)   01/13/20 2123 -- -- -- --   Oral           Physical Exam   Constitutional: She is oriented to person, place, and time and well-developed, well-nourished, and in no distress.   HENT:   Head: Normocephalic and atraumatic.   Mouth/Throat: Oropharynx is clear and moist.   Eyes: Pupils are equal, round, and reactive to light. EOM are normal.   Neck: Normal range of motion. Neck supple.   Cardiovascular: Intact distal pulses and normal pulses. An irregularly irregular rhythm present. Tachycardia present.   Pulmonary/Chest: Effort normal. No respiratory distress. She has rales (Faint crackles in right lung base.).   Abdominal: Soft. Bowel sounds are normal. There is no tenderness. There is no guarding.   Musculoskeletal: Normal range of motion. She exhibits no edema.   Neurological: She is alert and oriented to person, place, and time. GCS score is 15.   Skin: Skin is warm and dry.   Psychiatric: Mood and affect normal.         LAB RESULTS  Results for orders placed or performed during the hospital encounter of 01/13/20   Influenza Antigen, Rapid - Swab, Nasopharynx   Result Value Ref Range     Influenza A Ag, EIA Negative Negative    Influenza B Ag, EIA Negative Negative   Comprehensive Metabolic Panel   Result Value Ref Range    Glucose 139 (H) 65 - 99 mg/dL    BUN 27 (H) 8 - 23 mg/dL    Creatinine 1.14 (H) 0.57 - 1.00 mg/dL    Sodium 140 136 - 145 mmol/L    Potassium 4.3 3.5 - 5.2 mmol/L    Chloride 101 98 - 107 mmol/L    CO2 24.2 22.0 - 29.0 mmol/L    Calcium 8.9 8.6 - 10.5 mg/dL    Total Protein 7.0 6.0 - 8.5 g/dL    Albumin 4.20 3.50 - 5.20 g/dL    ALT (SGPT) 11 1 - 33 U/L    AST (SGOT) 21 1 - 32 U/L    Alkaline Phosphatase 71 39 - 117 U/L    Total Bilirubin 0.3 0.2 - 1.2 mg/dL    eGFR Non African Amer 45 (L) >60 mL/min/1.73    Globulin 2.8 gm/dL    A/G Ratio 1.5 g/dL    BUN/Creatinine Ratio 23.7 7.0 - 25.0    Anion Gap 14.8 5.0 - 15.0 mmol/L   Troponin   Result Value Ref Range    Troponin T 0.194 (C) 0.000-<0.030 ng/mL   CBC Auto Differential   Result Value Ref Range    WBC 10.28 3.40 - 10.80 10*3/mm3    RBC 3.69 (L) 3.77 - 5.28 10*6/mm3    Hemoglobin 11.9 (L) 12.0 - 15.9 g/dL    Hematocrit 37.4 34.0 - 46.6 %    .4 (H) 79.0 - 97.0 fL    MCH 32.2 26.6 - 33.0 pg    MCHC 31.8 31.5 - 35.7 g/dL    RDW 13.8 12.3 - 15.4 %    RDW-SD 51.8 37.0 - 54.0 fl    MPV 10.5 6.0 - 12.0 fL    Platelets 208 140 - 450 10*3/mm3    Neutrophil % 77.0 (H) 42.7 - 76.0 %    Lymphocyte % 15.7 (L) 19.6 - 45.3 %    Monocyte % 6.2 5.0 - 12.0 %    Eosinophil % 0.3 0.3 - 6.2 %    Basophil % 0.3 0.0 - 1.5 %    Immature Grans % 0.5 0.0 - 0.5 %    Neutrophils, Absolute 7.92 (H) 1.70 - 7.00 10*3/mm3    Lymphocytes, Absolute 1.61 0.70 - 3.10 10*3/mm3    Monocytes, Absolute 0.64 0.10 - 0.90 10*3/mm3    Eosinophils, Absolute 0.03 0.00 - 0.40 10*3/mm3    Basophils, Absolute 0.03 0.00 - 0.20 10*3/mm3    Immature Grans, Absolute 0.05 0.00 - 0.05 10*3/mm3    nRBC 0.0 0.0 - 0.2 /100 WBC   Protime-INR   Result Value Ref Range    Protime 13.8 12.1 - 15.0 Seconds    INR 1.09 0.90 - 1.10   Lipase   Result Value Ref Range    Lipase 40 13 -  60 U/L   aPTT   Result Value Ref Range    PTT 26.3 24.3 - 38.1 seconds   BNP   Result Value Ref Range    proBNP 2,006.0 (H) 5.0-1,800.0 pg/mL   Troponin   Result Value Ref Range    Troponin T 0.277 (C) 0.000-<0.030 ng/mL   Troponin   Result Value Ref Range    Troponin T 0.545 (C) 0.000-<0.030 ng/mL   Light Blue Top   Result Value Ref Range    Extra Tube hold for add-on    Green Top (Gel)   Result Value Ref Range    Extra Tube Hold for add-ons.    Lavender Top   Result Value Ref Range    Extra Tube hold for add-on    Gold Top - SST   Result Value Ref Range    Extra Tube Hold for add-ons.          I ordered the above labs and reviewed the results    RADIOLOGY  Xr Chest 2 View    Result Date: 1/13/2020  Narrative: CHEST X-RAY, 1/13/2020  HISTORY:  88-year-old female in the ED after a fall this afternoon. She complains of chest tightness.  TECHNIQUE: AP and lateral upright chest series. COMPARISON: *  Chest x-ray, 6/28/2015. FINDINGS: Mild to moderate cardiomegaly. Pulmonary vascularity is within normal limits. Large hiatal hernia with intrathoracic stomach. Scarring or atelectasis in both lung bases. Scoliosis. Small right pleural effusion, new since the prior exam.     Impression: 1.  Small right pleural effusion. Scarring or atelectasis in the lung bases. 2.  Large hiatal hernia with intrathoracic stomach. Signer Name: Brayden Knott MD  Signed: 1/13/2020 9:46 PM  [facility]: [email or web address]  [facility]      I ordered the above radiologic testing and reviewed the results    PROCEDURES  Procedures      PROGRESS AND CONSULTS  ED Course as of Jan 14 1238   Mon Jan 13, 2020 2045 EKG         EKG time / Interpretation time: 2033/2040  Rhythm/Rate: Atrial fibrillation, 119   [place]:   QRS, axis: -24  QTc 466  ST and T waves: There is no ST elevation or depression appreciated, no T wave inversions.  EKG Tracing Interpreted Contemporaneously by me, independently viewed by me and MD.   T waves in  leads V2 and V3 are now upright compared to EKG on 10/01/2018.      [KS]   2210 CONSULT  Time 2210  Discussed case with Dr Rowan, cardiology.  Reviewed history, exam, results and treatments.  Discussed concerns and plan of care. Dr Rowan advises that we give patient Lovenox, and 25 mg of metoprolol here, and transferred to [facility] for further work-up and treatment.      [KS]   2215 Patient has been updated with the results of her labs and imaging.  I discussed that I have already spoken with cardiology and the plan is to transfer patient to [facility] for NSTEMI.  Patient is agreeable with this plan and verbalizes understanding.    [KS]   2240 CONSULT  Time 2240  Discussed case with Dr Eisenberg, hospitalist at [facility].  Reviewed history, exam, results and treatments.  Discussed concerns and plan of care. Dr Eisenberg accepts pt to be admitted to [facility], however he states they are currently holding patients in the ED and it may be until tomorrow morning before a telemetry bed is ready.      [KS]   2255 Care handed over to Dr. Talbot.    [KS]   2337 Patient complaining of shortness of breath.  This developed while in the emergency room.  Oxygen saturations on room air dropped into the 80s.  Placed on nasal cannula 2 L/min.  Saturations improved slightly.  Saturations turned up to 4 L a minute.  Sats are now returned to the low 90s.  Patient has wheeze bilateral all lung fields.  Giving albuterol mini neb treatment.    [SS]   2342 Patient feeling better.  She has increased air movement.  Wheezing slightly louder now.  Will give additional mini neb treatment and approximately 20 minutes.    [SS]   Tue Jan 14, 2020   0104 Patient still has expiratory wheeze although air movement improved.  She is feeling better after 2 mini neb treatments.  Unfortunately there are no beds available at [facility] either telemetry or ICU.  I have spoken with   Jimena Hearn.  He nor I feel that patient should be hospitalized here.  Patient will require cardiac intervention in the near future.  Thus we are trying to transfer to [place].  I suspect patient's cardiac asthma is due to developing CHF.  Have discussed with Dr. Jimena Hearn.  Given patient Lasix 40 mg IV.Have discussed at length with patient and her daughter all results, issues with transferring the patient to [facility].  Unfortunately no beds available at [facility].  [facility] [place] [facility].I have discussed with and care transferred to Dr. Saman Li.      [SS]   0108 Patient's repeat troponin 0.277.  Obviously increasing from prior.    [SS]      ED Course User Index  [KS] Eva Cervantes PA-C  [SS] Trent Talbot MD           MEDICAL DECISION MAKING  Results were reviewed/discussed with the patient and they were also made aware of online access. Pt also made aware that some labs, such as cultures, will not be resulted during ER visit and follow up with PMD is necessary.     MDM      HEART Score (for prediction of 6-week risk of major adverse cardiac event) reviewed and/or performed as part of the patient evaluation and treatment planning process.  The result associated with this review/performance is: 5      My differential diagnosis for chest pain includes but is not limited to:  Muscle strain, costochondritis, myositis, pleurisy, rib fracture, intercostal neuritis, herpes zoster, tumor, myocardial infarction, coronary syndrome, unstable angina, angina, aortic dissection, mitral valve prolapse, pericarditis, palpitations, pulmonary embolus, pneumonia, pneumothorax, lung cancer, GERD, esophagitis, esophageal spasm      DIAGNOSIS  Final diagnoses:   NSTEMI (non-ST elevated myocardial infarction) (CMS/HCC)       Latest Documented Vital Signs:  As of 12:38 PM  BP- 98/65 HR- 110 Temp- 98.7 °F (37.1 °C) (Oral) O2 sat- 97%    DISPOSITION  Patient transferred to [place]  downtown ICU.       Medication List      No changes were made to your prescriptions during this visit.                 Dictated utilizing Dragon dictation       Eva Cervantes PA-C  01/14/20 1950

## 2020-01-15 PROCEDURE — 96375 TX/PRO/DX INJ NEW DRUG ADDON: CPT

## 2020-02-01 ENCOUNTER — LAB REQUISITION (OUTPATIENT)
Dept: LAB | Facility: HOSPITAL | Age: 85
End: 2020-02-01

## 2020-02-01 DIAGNOSIS — R41.841 COGNITIVE COMMUNICATION DEFICIT: ICD-10-CM

## 2020-02-01 DIAGNOSIS — J90 PLEURAL EFFUSION, NOT ELSEWHERE CLASSIFIED: ICD-10-CM

## 2020-02-01 DIAGNOSIS — R26.2 DIFFICULTY IN WALKING, NOT ELSEWHERE CLASSIFIED: ICD-10-CM

## 2020-02-01 DIAGNOSIS — K44.9 DIAPHRAGMATIC HERNIA WITHOUT OBSTRUCTION OR GANGRENE: ICD-10-CM

## 2020-02-01 DIAGNOSIS — I38 ENDOCARDITIS, VALVE UNSPECIFIED: ICD-10-CM

## 2020-02-01 DIAGNOSIS — M62.81 MUSCLE WEAKNESS (GENERALIZED): ICD-10-CM

## 2020-02-01 DIAGNOSIS — R32 UNSPECIFIED URINARY INCONTINENCE: ICD-10-CM

## 2020-02-01 DIAGNOSIS — E78.5 HYPERLIPIDEMIA, UNSPECIFIED: ICD-10-CM

## 2020-02-01 DIAGNOSIS — F32.9 MAJOR DEPRESSIVE DISORDER, SINGLE EPISODE, UNSPECIFIED: ICD-10-CM

## 2020-02-01 DIAGNOSIS — Z74.1 NEED FOR ASSISTANCE WITH PERSONAL CARE: ICD-10-CM

## 2020-02-01 DIAGNOSIS — I87.2 VENOUS INSUFFICIENCY (CHRONIC) (PERIPHERAL): ICD-10-CM

## 2020-02-01 DIAGNOSIS — R52 PAIN, UNSPECIFIED: ICD-10-CM

## 2020-02-01 DIAGNOSIS — I10 ESSENTIAL (PRIMARY) HYPERTENSION: ICD-10-CM

## 2020-02-01 DIAGNOSIS — I48.91 UNSPECIFIED ATRIAL FIBRILLATION (HCC): ICD-10-CM

## 2020-02-01 DIAGNOSIS — I21.4 NON-ST ELEVATION (NSTEMI) MYOCARDIAL INFARCTION (HCC): ICD-10-CM

## 2020-02-01 LAB

## 2020-02-01 PROCEDURE — 0100U HC BIOFIRE FILMARRAY RESP PANEL 2: CPT | Performed by: INTERNAL MEDICINE

## 2020-02-19 NOTE — PROGRESS NOTES
"  Date of Office Visit: 2020  Encounter Provider: TONI Gallegos  Place of Service: [facility] CARDIOLOGY  Patient Name: Gaby Diaz  :2/10/1931  Primary Cardiologist: Dr. Jarvis    CC:  Follow up ER    Dear Dr. Ordaz    HPI: Gaby Diaz is a pleasant 89 y.o. female who presents 2020 for cardiac follow up.   She is a new patient to me and I have reviewed her past medical records.  She is a patient of Dr. Jarvis.    She has a known history of chronic underlying atrial fibrillation, hypertension, and hyperlipidemia. She had a stress nuclear study done in 2010 with a preoperative evaluation showing anterior ischemia but she had no active angina. She went on to get a left knee replacement done. She had previously had a right knee replacement done. Her last echocardiogram was done on 2010, showing an ejection fraction of 54% with marked biatrial enlargement, aortic valve calcification without stenosis, mild mitral insufficiency, mild tricuspid insufficiency, and normal right ventricular systolic Pressure.      She fell and fractured her hip and was treated at [facility]. It was the left hip for which she got a replacement.      She had an echocardiogram done 17.  It showed ejection fraction 60%, mild right ventricular dilation, severe biatrial enlargement, moderate aortic insufficiency, moderate tricuspid insufficiency, mild-to-moderate mitral insufficiency, RVSP 50 mmHg.  She went on to have right hip replacement after this.     She presented to [facility] on 20 and found to have NSTEMI.  Plans were to transfer to [facility], but there were no beds available.  She was then  Transferred to  [facility]  with chest pressure and a \"fluttering\" in her chest.   She had a cardiac catheterization on 20 with an  unsuccessful PTCA of the mid LAD.  She underwent a repeat catheterization on " 1/17/20 with Status post r atherectomy assisted PCI.    She was started on Eliquis 2.5 mg, and continued plavix, BB, statin. She was discharged on 1/21/2020.    ECHO: 1/14/2020  Summary  The ejection fraction biplane was calculated at 29%. Severe mid to distal  anterior apical, inferior apical ,septal and distal lateral apical wall  hypokinesis. No obvious thrombus  Moderate aortic leaflet calcification. Trileaflet valve with decreased  excursion. Centralized aortic insufficiency jet moderate, this probably some  degree of aortic stenosis visually it appears moderate to severe probably no  significant gradient due to low cardiac output  Mild thickening of mitral valve leaflets.  No mitral stenosis.  Moderate mitral regurgitation is present. Centralized jet  Right ventricular systolic pressure of 53 mmHg.  Moderate to severe tricuspid regurgitation.  The tricuspid valve leaflets are mildly thickened    She presents today with her daughter.  She is in good spirits and states she feels fairly well.  She denies any palpitations, dizziness or lightheadedness.  She states she does still feel weak and fatigued.  She says she has shortness of breath and a chest pressure with exertion.  She also has this when she lies down at night for the first few minutes.  She states when she lies there still for a while but it passes.  She does have lower extremity edema that she feels is a little worse than when it was in the hospital.  She was taken off her chlorthalidone and not restarted on any anti-diuretics.  She is not having any bleeding and is tolerating the Eliquis.    Past Medical History:   Diagnosis Date   • Arthritis of back    • Arthritis of neck    • Atrial fibrillation (CMS/HCC)    • Hip arthrosis    • Hyperlipidemia    • Hypertension    • Knee swelling    • Myocardial ischemia     inferior wall   • Stroke syndrome        Past Surgical History:   Procedure Laterality Date   • HIP SURGERY     • REPLACEMENT TOTAL KNEE  Bilateral        Social History     Socioeconomic History   • Marital status:      Spouse name: Not on file   • Number of children: Not on file   • Years of education: Not on file   • Highest education level: Not on file   Tobacco Use   • Smoking status: Never Smoker   • Smokeless tobacco: Never Used   Substance and Sexual Activity   • Alcohol use: No   • Drug use: No       Family History   Problem Relation Age of Onset   • Hypertension Mother    • Heart attack Father    • Heart attack Brother    • Hypertension Brother    • Stroke Brother    • Heart attack Daughter        The following portion of the patient's history were reviewed and updated as appropriate: past medical history, past surgical history, past social history, past family history, allergies, current medications, and problem list.    ROS    No Known Allergies      Current Outpatient Medications:   •  apixaban (ELIQUIS) 2.5 MG tablet tablet, Every 12 (Twelve) Hours., Disp: , Rfl:   •  clopidogrel (PLAVIX) 75 MG tablet, clopidogrel 75 mg tablet  Take 1 tablet every day by oral route., Disp: , Rfl:   •  FLUoxetine (PROzac) 20 MG capsule, Take  by mouth daily., Disp: , Rfl:   •  metoprolol tartrate (LOPRESSOR) 25 MG tablet, Every 12 (Twelve) Hours., Disp: , Rfl:   •  sennosides-docusate sodium (SENOKOT-S) 8.6-50 MG tablet, Take 2 tablets by mouth 2 (Two) Times a Day. (Patient taking differently: Take 2 tablets by mouth 2 (Two) Times a Day As Needed.), Disp: 30 tablet, Rfl: 0  •  simvastatin (ZOCOR) 20 MG tablet, Take  by mouth., Disp: , Rfl:   •  furosemide (LASIX) 40 MG tablet, Take 1 tablet by mouth Daily., Disp: 30 tablet, Rfl: 1  •  potassium chloride (K-DUR,KLOR-CON) 20 MEQ CR tablet, Take 1 tablet by mouth 2 (Two) Times a Day., Disp: 30 tablet, Rfl: 1        Objective:     Vitals:    02/21/20 1354   BP: 130/70   BP Location: Right arm   Patient Position: Sitting   Cuff Size: Adult   Pulse: 74   Resp: 16   SpO2: 98%   Weight: 61.2 kg (135 lb)  "  Height: 157.5 cm (62\")     Body mass index is 24.69 kg/m².      Physical Exam          ECG 12 Lead  Date/Time: 2/21/2020 1:57 PM  Performed by: Gabriella Joshi APRN  Authorized by: Gabriella Joshi APRN   Comparison: compared with previous ECG from 1/13/2020  Rhythm: atrial fibrillation  Rate: normal  Conduction: conduction normal  ST Segments: ST segments normal  T inversion: I, II, aVL, V2, V3, V4 and V5  T flattening: aVF, V1 and V6  QRS axis: normal (borderline left axis deviation)    Clinical impression: abnormal EKG              Assessment:       Diagnosis Plan   1. Persistent atrial fibrillation  Adult Transthoracic Echo Complete W/ Cont if Necessary Per Protocol   2. Mixed hyperlipidemia     3. Essential hypertension  Basic Metabolic Panel    potassium chloride (K-DUR,KLOR-CON) 20 MEQ CR tablet   4. Coronary artery disease of native artery of native heart with stable angina pectoris (CMS/HCC)     5. Ischemic cardiomyopathy            Plan:       1.  Atrial fibrillation - rate controlled, on BB and now on Eliquis ( was previously on warfarin)    2.  Hyperlipidemia - continue lipid lowering therapy, she was started on simvastatin 20 mg daily    3.  Hypertension - controlled, per Dr. Ford    4.  CAD - s/p NSTEMI 1/13/2020.  She had a cardiac catheterization on 1/14/20 with an  unsuccessful PTCA of the mid LAD.  She underwent a repeat catheterization on 1/17/20 with Status post r atherectomy assisted PCI.        5.   Cardiomyopathy - Echo 1/2020 with EF 29%, Severe mid to distal anterior apical, inferior apical ,septal and distal lateral apical wall hypokinesis. No obvious thrombus Moderate aortic leaflet calcification. Trileaflet valve with decreased excursion. Centralized aortic insufficiency jet moderate, this probably some degree of aortic stenosis visually it appears moderate to severe probably no significant gradient due to low cardiac output Mild thickening of mitral valve leaflets. No mitral " stenosis.  Moderate mitral regurgitation is present. Centralized jet Right ventricular systolic pressure of 53 mmHg. Moderate to severe tricuspid regurgitation. The tricuspid valve leaflets are mildly thickened.  Will recheck Echo.      6.  LE edema due to venous insufficiency - Her chlorthalidone was stopped during her hospitalization and a diuretic was not restarted.  D/W RM.  Will start lasix 40 mg daily, Kdur 20 benjamin daily, check BMP in 10 days.    7.  Right hip replacement secondary to OA    As always, it has been a pleasure to participate in your patient's care. Thank you.     RTO in 8 weeks with RM    Sincerely,       TONI Gallegos      Current Outpatient Medications:   •  apixaban (ELIQUIS) 2.5 MG tablet tablet, Every 12 (Twelve) Hours., Disp: , Rfl:   •  clopidogrel (PLAVIX) 75 MG tablet, clopidogrel 75 mg tablet  Take 1 tablet every day by oral route., Disp: , Rfl:   •  FLUoxetine (PROzac) 20 MG capsule, Take  by mouth daily., Disp: , Rfl:   •  metoprolol tartrate (LOPRESSOR) 25 MG tablet, Every 12 (Twelve) Hours., Disp: , Rfl:   •  sennosides-docusate sodium (SENOKOT-S) 8.6-50 MG tablet, Take 2 tablets by mouth 2 (Two) Times a Day. (Patient taking differently: Take 2 tablets by mouth 2 (Two) Times a Day As Needed.), Disp: 30 tablet, Rfl: 0  •  simvastatin (ZOCOR) 20 MG tablet, Take  by mouth., Disp: , Rfl:   •  furosemide (LASIX) 40 MG tablet, Take 1 tablet by mouth Daily., Disp: 30 tablet, Rfl: 1  •  potassium chloride (K-DUR,KLOR-CON) 20 MEQ CR tablet, Take 1 tablet by mouth 2 (Two) Times a Day., Disp: 30 tablet, Rfl: 1    Dictated utilizing Dragon dictation

## 2020-02-21 ENCOUNTER — OFFICE VISIT (OUTPATIENT)
Dept: CARDIOLOGY | Facility: CLINIC | Age: 85
End: 2020-02-21

## 2020-02-21 VITALS
BODY MASS INDEX: 24.84 KG/M2 | OXYGEN SATURATION: 98 % | HEIGHT: 62 IN | DIASTOLIC BLOOD PRESSURE: 70 MMHG | SYSTOLIC BLOOD PRESSURE: 130 MMHG | RESPIRATION RATE: 16 BRPM | WEIGHT: 135 LBS | HEART RATE: 74 BPM

## 2020-02-21 DIAGNOSIS — I48.19 PERSISTENT ATRIAL FIBRILLATION (HCC): ICD-10-CM

## 2020-02-21 DIAGNOSIS — I25.118 CORONARY ARTERY DISEASE OF NATIVE ARTERY OF NATIVE HEART WITH STABLE ANGINA PECTORIS (HCC): ICD-10-CM

## 2020-02-21 DIAGNOSIS — I25.5 ISCHEMIC CARDIOMYOPATHY: ICD-10-CM

## 2020-02-21 DIAGNOSIS — I10 ESSENTIAL HYPERTENSION: ICD-10-CM

## 2020-02-21 DIAGNOSIS — E78.2 MIXED HYPERLIPIDEMIA: ICD-10-CM

## 2020-02-21 PROCEDURE — 93000 ELECTROCARDIOGRAM COMPLETE: CPT | Performed by: NURSE PRACTITIONER

## 2020-02-21 PROCEDURE — 99214 OFFICE O/P EST MOD 30 MIN: CPT | Performed by: NURSE PRACTITIONER

## 2020-02-21 RX ORDER — CLOPIDOGREL BISULFATE 75 MG/1
TABLET ORAL
COMMUNITY
Start: 2020-01-21 | End: 2020-09-18

## 2020-02-21 RX ORDER — POTASSIUM CHLORIDE 20 MEQ/1
20 TABLET, EXTENDED RELEASE ORAL 2 TIMES DAILY
Qty: 30 TABLET | Refills: 1 | Status: SHIPPED | OUTPATIENT
Start: 2020-02-21 | End: 2020-02-24 | Stop reason: SDUPTHER

## 2020-02-21 RX ORDER — FUROSEMIDE 40 MG/1
40 TABLET ORAL DAILY
Qty: 30 TABLET | Refills: 1 | Status: SHIPPED | OUTPATIENT
Start: 2020-02-21 | End: 2020-02-24 | Stop reason: SDUPTHER

## 2020-02-24 DIAGNOSIS — I10 ESSENTIAL HYPERTENSION: ICD-10-CM

## 2020-02-24 RX ORDER — POTASSIUM CHLORIDE 20 MEQ/1
20 TABLET, EXTENDED RELEASE ORAL 2 TIMES DAILY
Qty: 90 TABLET | Refills: 3 | Status: SHIPPED | OUTPATIENT
Start: 2020-02-24 | End: 2020-10-15 | Stop reason: HOSPADM

## 2020-02-24 RX ORDER — FUROSEMIDE 40 MG/1
40 TABLET ORAL DAILY
Qty: 90 TABLET | Refills: 3 | Status: SHIPPED | OUTPATIENT
Start: 2020-02-24 | End: 2020-09-18

## 2020-03-06 ENCOUNTER — LAB (OUTPATIENT)
Dept: LAB | Facility: HOSPITAL | Age: 85
End: 2020-03-06

## 2020-03-06 DIAGNOSIS — I10 ESSENTIAL HYPERTENSION: ICD-10-CM

## 2020-03-06 LAB
ANION GAP SERPL CALCULATED.3IONS-SCNC: 15.3 MMOL/L (ref 5–15)
BUN BLD-MCNC: 27 MG/DL (ref 8–23)
BUN/CREAT SERPL: 19.9 (ref 7–25)
CALCIUM SPEC-SCNC: 9.4 MG/DL (ref 8.6–10.5)
CHLORIDE SERPL-SCNC: 96 MMOL/L (ref 98–107)
CO2 SERPL-SCNC: 28.7 MMOL/L (ref 22–29)
CREAT BLD-MCNC: 1.36 MG/DL (ref 0.57–1)
GFR SERPL CREATININE-BSD FRML MDRD: 37 ML/MIN/1.73
GLUCOSE BLD-MCNC: 93 MG/DL (ref 65–99)
POTASSIUM BLD-SCNC: 4.5 MMOL/L (ref 3.5–5.2)
SODIUM BLD-SCNC: 140 MMOL/L (ref 136–145)

## 2020-03-06 PROCEDURE — 36415 COLL VENOUS BLD VENIPUNCTURE: CPT

## 2020-03-06 PROCEDURE — 80048 BASIC METABOLIC PNL TOTAL CA: CPT

## 2020-03-10 ENCOUNTER — TELEPHONE (OUTPATIENT)
Dept: CARDIOLOGY | Facility: CLINIC | Age: 85
End: 2020-03-10

## 2020-03-18 ENCOUNTER — APPOINTMENT (OUTPATIENT)
Dept: CARDIOLOGY | Facility: HOSPITAL | Age: 85
End: 2020-03-18

## 2020-04-16 ENCOUNTER — TELEPHONE (OUTPATIENT)
Dept: CARDIOLOGY | Facility: CLINIC | Age: 85
End: 2020-04-16

## 2020-04-16 DIAGNOSIS — I48.19 PERSISTENT ATRIAL FIBRILLATION (HCC): ICD-10-CM

## 2020-04-16 DIAGNOSIS — I10 ESSENTIAL HYPERTENSION: Primary | ICD-10-CM

## 2020-04-21 DIAGNOSIS — I25.5 ISCHEMIC CARDIOMYOPATHY: Primary | ICD-10-CM

## 2020-04-21 DIAGNOSIS — I48.20 CHRONIC ATRIAL FIBRILLATION (HCC): ICD-10-CM

## 2020-04-21 DIAGNOSIS — I10 ESSENTIAL HYPERTENSION: ICD-10-CM

## 2020-04-21 NOTE — TELEPHONE ENCOUNTER
Sabi from [facility] called and states that if you can revised the order for echo because pt insurance due to code not acceptable.    She can be reach at [phone].      Thanks  Ely

## 2020-04-29 ENCOUNTER — HOSPITAL ENCOUNTER (OUTPATIENT)
Dept: CARDIOLOGY | Facility: HOSPITAL | Age: 85
Discharge: HOME | End: 2020-04-29
Admitting: INTERNAL MEDICINE

## 2020-04-29 ENCOUNTER — TELEPHONE (OUTPATIENT)
Dept: CARDIOLOGY | Facility: HOSPITAL | Age: 85
End: 2020-04-29

## 2020-04-29 VITALS
SYSTOLIC BLOOD PRESSURE: 140 MMHG | DIASTOLIC BLOOD PRESSURE: 95 MMHG | HEIGHT: 62 IN | WEIGHT: 135 LBS | BODY MASS INDEX: 24.84 KG/M2

## 2020-04-29 DIAGNOSIS — I48.19 PERSISTENT ATRIAL FIBRILLATION (HCC): ICD-10-CM

## 2020-04-29 DIAGNOSIS — I10 ESSENTIAL HYPERTENSION: ICD-10-CM

## 2020-04-29 LAB
BH CV ECHO MEAS - ACS: 1.4 CM
BH CV ECHO MEAS - AO MAX PG: 14 MMHG
BH CV ECHO MEAS - AO MEAN PG (FULL): 4.5 MMHG
BH CV ECHO MEAS - AO MEAN PG: 7 MMHG
BH CV ECHO MEAS - AO ROOT AREA (BSA CORRECTED): 2
BH CV ECHO MEAS - AO ROOT AREA: 8 CM^2
BH CV ECHO MEAS - AO ROOT DIAM: 3.2 CM
BH CV ECHO MEAS - AO V2 MAX: 187 CM/SEC
BH CV ECHO MEAS - AO V2 MEAN: 114 CM/SEC
BH CV ECHO MEAS - AO V2 VTI: 33.7 CM
BH CV ECHO MEAS - AVA(I,A): 1.2 CM^2
BH CV ECHO MEAS - AVA(I,D): 1.2 CM^2
BH CV ECHO MEAS - BSA(HAYCOCK): 1.6 M^2
BH CV ECHO MEAS - BSA: 1.6 M^2
BH CV ECHO MEAS - BZI_BMI: 24.7 KILOGRAMS/M^2
BH CV ECHO MEAS - BZI_METRIC_HEIGHT: 157.5 CM
BH CV ECHO MEAS - BZI_METRIC_WEIGHT: 61.2 KG
BH CV ECHO MEAS - EDV(CUBED): 64.5 ML
BH CV ECHO MEAS - EDV(MOD-SP2): 52.8 ML
BH CV ECHO MEAS - EDV(MOD-SP4): 50.3 ML
BH CV ECHO MEAS - EDV(TEICH): 70.4 ML
BH CV ECHO MEAS - EF(CUBED): 63.3 %
BH CV ECHO MEAS - EF(MOD-BP): 57 %
BH CV ECHO MEAS - EF(MOD-SP2): 59.3 %
BH CV ECHO MEAS - EF(MOD-SP4): 57.9 %
BH CV ECHO MEAS - EF(TEICH): 55.4 %
BH CV ECHO MEAS - ESV(CUBED): 23.6 ML
BH CV ECHO MEAS - ESV(MOD-SP2): 21.5 ML
BH CV ECHO MEAS - ESV(MOD-SP4): 21.2 ML
BH CV ECHO MEAS - ESV(TEICH): 31.4 ML
BH CV ECHO MEAS - FS: 28.4 %
BH CV ECHO MEAS - IVS/LVPW: 1
BH CV ECHO MEAS - IVSD: 0.78 CM
BH CV ECHO MEAS - LV DIASTOLIC VOL/BSA (35-75): 31.1 ML/M^2
BH CV ECHO MEAS - LV MASS(C)D: 89.9 GRAMS
BH CV ECHO MEAS - LV MASS(C)DI: 55.6 GRAMS/M^2
BH CV ECHO MEAS - LV MAX PG: 3 MMHG
BH CV ECHO MEAS - LV MEAN PG: 2 MMHG
BH CV ECHO MEAS - LV SYSTOLIC VOL/BSA (12-30): 13.1 ML/M^2
BH CV ECHO MEAS - LV V1 MAX: 87 CM/SEC
BH CV ECHO MEAS - LV V1 MEAN: 57.2 CM/SEC
BH CV ECHO MEAS - LV V1 VTI: 17.3 CM
BH CV ECHO MEAS - LVIDD: 4 CM
BH CV ECHO MEAS - LVIDS: 2.9 CM
BH CV ECHO MEAS - LVLD AP2: 6.6 CM
BH CV ECHO MEAS - LVLD AP4: 6.6 CM
BH CV ECHO MEAS - LVLS AP2: 5.5 CM
BH CV ECHO MEAS - LVLS AP4: 5.2 CM
BH CV ECHO MEAS - LVOT AREA (M): 2.3 CM^2
BH CV ECHO MEAS - LVOT AREA: 2.3 CM^2
BH CV ECHO MEAS - LVOT DIAM: 1.7 CM
BH CV ECHO MEAS - LVPWD: 0.77 CM
BH CV ECHO MEAS - MED PEAK E' VEL: 9 CM/SEC
BH CV ECHO MEAS - PA ACC TIME: 0.1 SEC
BH CV ECHO MEAS - PA MAX PG: 3.7 MMHG
BH CV ECHO MEAS - PA PR(ACCEL): 34.9 MMHG
BH CV ECHO MEAS - PA V2 MAX: 95.6 CM/SEC
BH CV ECHO MEAS - QP/QS: 0.77
BH CV ECHO MEAS - RAP SYSTOLE: 3 MMHG
BH CV ECHO MEAS - RV MEAN PG: 1 MMHG
BH CV ECHO MEAS - RV V1 MEAN: 44.1 CM/SEC
BH CV ECHO MEAS - RV V1 VTI: 11.9 CM
BH CV ECHO MEAS - RVOT AREA: 2.5 CM^2
BH CV ECHO MEAS - RVOT DIAM: 1.8 CM
BH CV ECHO MEAS - RVSP: 25 MMHG
BH CV ECHO MEAS - SI(AO): 167.3 ML/M^2
BH CV ECHO MEAS - SI(CUBED): 25.3 ML/M^2
BH CV ECHO MEAS - SI(LVOT): 24.3 ML/M^2
BH CV ECHO MEAS - SI(MOD-SP2): 19.4 ML/M^2
BH CV ECHO MEAS - SI(MOD-SP4): 18 ML/M^2
BH CV ECHO MEAS - SI(TEICH): 24.1 ML/M^2
BH CV ECHO MEAS - SV(AO): 270.6 ML
BH CV ECHO MEAS - SV(CUBED): 40.8 ML
BH CV ECHO MEAS - SV(LVOT): 39.3 ML
BH CV ECHO MEAS - SV(MOD-SP2): 31.3 ML
BH CV ECHO MEAS - SV(MOD-SP4): 29.1 ML
BH CV ECHO MEAS - SV(RVOT): 30.3 ML
BH CV ECHO MEAS - SV(TEICH): 39 ML
BH CV ECHO MEAS - TAPSE (>1.6): 1.5 CM
BH CV ECHO MEAS - TR MAX VEL: 234 CM/SEC
BH CV VAS BP LEFT ARM: NORMAL MMHG
BH CV XLRA - RV BASE: 3.9 CM
BH CV XLRA - RV LENGTH: 5.5 CM
BH CV XLRA - RV MID: 2.6 CM
BH CV XLRA - TDI S': 9 CM/SEC
LEFT ATRIUM VOLUME INDEX: 57 ML/M2
MAXIMAL PREDICTED HEART RATE: 131 BPM
STRESS TARGET HR: 111 BPM

## 2020-04-29 PROCEDURE — 93306 TTE W/DOPPLER COMPLETE: CPT | Performed by: INTERNAL MEDICINE

## 2020-04-29 PROCEDURE — 93306 TTE W/DOPPLER COMPLETE: CPT

## 2020-04-29 NOTE — TELEPHONE ENCOUNTER
1535 Call to patient with results of stable echocardiogram.  Will call back with any questions.  CHEMA Lloyd RN

## 2020-07-20 ENCOUNTER — OFFICE VISIT (OUTPATIENT)
Dept: CARDIOLOGY | Facility: CLINIC | Age: 85
End: 2020-07-20

## 2020-07-20 VITALS
DIASTOLIC BLOOD PRESSURE: 80 MMHG | HEIGHT: 60 IN | BODY MASS INDEX: 24.54 KG/M2 | SYSTOLIC BLOOD PRESSURE: 120 MMHG | WEIGHT: 125 LBS | HEART RATE: 69 BPM

## 2020-07-20 DIAGNOSIS — I48.19 PERSISTENT ATRIAL FIBRILLATION (HCC): Primary | ICD-10-CM

## 2020-07-20 DIAGNOSIS — I10 ESSENTIAL HYPERTENSION: ICD-10-CM

## 2020-07-20 DIAGNOSIS — E78.2 MIXED HYPERLIPIDEMIA: ICD-10-CM

## 2020-07-20 DIAGNOSIS — I25.10 CAD, MULTIPLE VESSEL: ICD-10-CM

## 2020-07-20 PROCEDURE — 99214 OFFICE O/P EST MOD 30 MIN: CPT | Performed by: INTERNAL MEDICINE

## 2020-07-20 PROCEDURE — 93000 ELECTROCARDIOGRAM COMPLETE: CPT | Performed by: INTERNAL MEDICINE

## 2020-07-20 RX ORDER — ISOSORBIDE MONONITRATE 30 MG/1
30 TABLET, EXTENDED RELEASE ORAL DAILY
Qty: 30 TABLET | Refills: 2 | Status: SHIPPED | OUTPATIENT
Start: 2020-07-20 | End: 2020-09-18

## 2020-07-20 RX ORDER — LISINOPRIL 5 MG/1
5 TABLET ORAL DAILY
COMMUNITY
End: 2020-07-20

## 2020-07-20 RX ORDER — ISOSORBIDE MONONITRATE 30 MG/1
30 TABLET, EXTENDED RELEASE ORAL DAILY
Qty: 90 TABLET | Refills: 3 | Status: SHIPPED | OUTPATIENT
Start: 2020-07-20 | End: 2020-09-18
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
## 2020-09-12 ENCOUNTER — LAB REQUISITION (OUTPATIENT)
Dept: LAB | Facility: HOSPITAL | Age: 85
End: 2020-09-12

## 2020-09-12 DIAGNOSIS — D62 ACUTE POSTHEMORRHAGIC ANEMIA: ICD-10-CM

## 2020-09-12 DIAGNOSIS — R79.89 OTHER SPECIFIED ABNORMAL FINDINGS OF BLOOD CHEMISTRY: ICD-10-CM

## 2020-09-12 LAB
BASOPHILS # BLD AUTO: 0.02 10*3/MM3 (ref 0–0.2)
BASOPHILS NFR BLD AUTO: 0.2 % (ref 0–1.5)
DEPRECATED RDW RBC AUTO: 54.1 FL (ref 37–54)
EOSINOPHIL # BLD AUTO: 0.09 10*3/MM3 (ref 0–0.4)
EOSINOPHIL NFR BLD AUTO: 1.1 % (ref 0.3–6.2)
ERYTHROCYTE [DISTWIDTH] IN BLOOD BY AUTOMATED COUNT: 14.9 % (ref 12.3–15.4)
HCT VFR BLD AUTO: 28.3 % (ref 34–46.6)
HGB BLD-MCNC: 8.5 G/DL (ref 12–15.9)
IMM GRANULOCYTES # BLD AUTO: 0.07 10*3/MM3 (ref 0–0.05)
IMM GRANULOCYTES NFR BLD AUTO: 0.8 % (ref 0–0.5)
LYMPHOCYTES # BLD AUTO: 1.65 10*3/MM3 (ref 0.7–3.1)
LYMPHOCYTES NFR BLD AUTO: 20 % (ref 19.6–45.3)
MCH RBC QN AUTO: 29.8 PG (ref 26.6–33)
MCHC RBC AUTO-ENTMCNC: 30 G/DL (ref 31.5–35.7)
MCV RBC AUTO: 99.3 FL (ref 79–97)
MONOCYTES # BLD AUTO: 0.95 10*3/MM3 (ref 0.1–0.9)
MONOCYTES NFR BLD AUTO: 11.5 % (ref 5–12)
NEUTROPHILS NFR BLD AUTO: 5.47 10*3/MM3 (ref 1.7–7)
NEUTROPHILS NFR BLD AUTO: 66.4 % (ref 42.7–76)
NRBC BLD AUTO-RTO: 0 /100 WBC (ref 0–0.2)
PLATELET # BLD AUTO: 211 10*3/MM3 (ref 140–450)
PMV BLD AUTO: 10.5 FL (ref 6–12)
RBC # BLD AUTO: 2.85 10*6/MM3 (ref 3.77–5.28)
WBC # BLD AUTO: 8.25 10*3/MM3 (ref 3.4–10.8)

## 2020-09-12 PROCEDURE — 85025 COMPLETE CBC W/AUTO DIFF WBC: CPT | Performed by: FAMILY MEDICINE

## 2020-09-13 ENCOUNTER — LAB REQUISITION (OUTPATIENT)
Dept: LAB | Facility: HOSPITAL | Age: 85
End: 2020-09-13

## 2020-09-13 DIAGNOSIS — D62 ACUTE POSTHEMORRHAGIC ANEMIA: ICD-10-CM

## 2020-09-13 DIAGNOSIS — R79.89 OTHER SPECIFIED ABNORMAL FINDINGS OF BLOOD CHEMISTRY: ICD-10-CM

## 2020-09-13 LAB
BASOPHILS # BLD AUTO: 0.02 10*3/MM3 (ref 0–0.2)
BASOPHILS NFR BLD AUTO: 0.2 % (ref 0–1.5)
DEPRECATED RDW RBC AUTO: 53.5 FL (ref 37–54)
EOSINOPHIL # BLD AUTO: 0.13 10*3/MM3 (ref 0–0.4)
EOSINOPHIL NFR BLD AUTO: 1.5 % (ref 0.3–6.2)
ERYTHROCYTE [DISTWIDTH] IN BLOOD BY AUTOMATED COUNT: 14.8 % (ref 12.3–15.4)
HCT VFR BLD AUTO: 29.1 % (ref 34–46.6)
HGB BLD-MCNC: 8.8 G/DL (ref 12–15.9)
IMM GRANULOCYTES # BLD AUTO: 0.1 10*3/MM3 (ref 0–0.05)
IMM GRANULOCYTES NFR BLD AUTO: 1.2 % (ref 0–0.5)
LYMPHOCYTES # BLD AUTO: 1.39 10*3/MM3 (ref 0.7–3.1)
LYMPHOCYTES NFR BLD AUTO: 16.1 % (ref 19.6–45.3)
MCH RBC QN AUTO: 30.1 PG (ref 26.6–33)
MCHC RBC AUTO-ENTMCNC: 30.2 G/DL (ref 31.5–35.7)
MCV RBC AUTO: 99.7 FL (ref 79–97)
MONOCYTES # BLD AUTO: 1.07 10*3/MM3 (ref 0.1–0.9)
MONOCYTES NFR BLD AUTO: 12.4 % (ref 5–12)
NEUTROPHILS NFR BLD AUTO: 5.9 10*3/MM3 (ref 1.7–7)
NEUTROPHILS NFR BLD AUTO: 68.6 % (ref 42.7–76)
NRBC BLD AUTO-RTO: 0 /100 WBC (ref 0–0.2)
PLATELET # BLD AUTO: 234 10*3/MM3 (ref 140–450)
PMV BLD AUTO: 10.2 FL (ref 6–12)
RBC # BLD AUTO: 2.92 10*6/MM3 (ref 3.77–5.28)
WBC # BLD AUTO: 8.61 10*3/MM3 (ref 3.4–10.8)

## 2020-09-13 PROCEDURE — 85025 COMPLETE CBC W/AUTO DIFF WBC: CPT | Performed by: FAMILY MEDICINE

## 2020-09-17 ENCOUNTER — TRANSCRIBE ORDERS (OUTPATIENT)
Dept: ADMINISTRATIVE | Facility: HOSPITAL | Age: 85
End: 2020-09-17

## 2020-09-17 DIAGNOSIS — R93.89 ABNORMAL CHEST X-RAY: Primary | ICD-10-CM

## 2020-09-17 DIAGNOSIS — J18.9 PNEUMONIA DUE TO INFECTIOUS ORGANISM, UNSPECIFIED LATERALITY, UNSPECIFIED PART OF LUNG: ICD-10-CM

## 2020-09-18 ENCOUNTER — APPOINTMENT (OUTPATIENT)
Dept: GENERAL RADIOLOGY | Facility: HOSPITAL | Age: 85
End: 2020-09-18

## 2020-09-18 ENCOUNTER — APPOINTMENT (OUTPATIENT)
Dept: CT IMAGING | Facility: HOSPITAL | Age: 85
End: 2020-09-18

## 2020-09-18 ENCOUNTER — HOSPITAL ENCOUNTER (OUTPATIENT)
Facility: HOSPITAL | Age: 85
Setting detail: OBSERVATION
Discharge: NURSING, REHAB OR LONG-TERM CARE | End: 2020-09-21
Attending: EMERGENCY MEDICINE | Admitting: INTERNAL MEDICINE

## 2020-09-18 DIAGNOSIS — I61.5 INTRAVENTRICULAR HEMORRHAGE (HCC): Primary | ICD-10-CM

## 2020-09-18 DIAGNOSIS — D64.9 ANEMIA, UNSPECIFIED TYPE: ICD-10-CM

## 2020-09-18 LAB
ABO GROUP BLD: NORMAL
ALBUMIN SERPL-MCNC: 2.9 G/DL (ref 3.5–5.2)
ALBUMIN/GLOB SERPL: 1.2 G/DL
ALP SERPL-CCNC: 120 U/L (ref 39–117)
ALT SERPL W P-5'-P-CCNC: 8 U/L (ref 1–33)
ANION GAP SERPL CALCULATED.3IONS-SCNC: 8.4 MMOL/L (ref 5–15)
APTT PPP: 32.7 SECONDS (ref 22.7–35.4)
AST SERPL-CCNC: 14 U/L (ref 1–32)
B PARAPERT DNA SPEC QL NAA+PROBE: NOT DETECTED
B PERT DNA SPEC QL NAA+PROBE: NOT DETECTED
BACTERIA UR QL AUTO: ABNORMAL /HPF
BASOPHILS # BLD AUTO: 0.02 10*3/MM3 (ref 0–0.2)
BASOPHILS NFR BLD AUTO: 0.2 % (ref 0–1.5)
BILIRUB SERPL-MCNC: 0.4 MG/DL (ref 0–1.2)
BILIRUB UR QL STRIP: NEGATIVE
BLD GP AB SCN SERPL QL: NEGATIVE
BUN SERPL-MCNC: 21 MG/DL (ref 8–23)
BUN/CREAT SERPL: 20.4 (ref 7–25)
C PNEUM DNA NPH QL NAA+NON-PROBE: NOT DETECTED
CALCIUM SPEC-SCNC: 8 MG/DL (ref 8.6–10.5)
CHLORIDE SERPL-SCNC: 105 MMOL/L (ref 98–107)
CLARITY UR: CLEAR
CO2 SERPL-SCNC: 26.6 MMOL/L (ref 22–29)
COLOR UR: YELLOW
CREAT SERPL-MCNC: 1.03 MG/DL (ref 0.57–1)
DEPRECATED RDW RBC AUTO: 46.7 FL (ref 37–54)
EOSINOPHIL # BLD AUTO: 0.05 10*3/MM3 (ref 0–0.4)
EOSINOPHIL NFR BLD AUTO: 0.5 % (ref 0.3–6.2)
ERYTHROCYTE [DISTWIDTH] IN BLOOD BY AUTOMATED COUNT: 14.2 % (ref 12.3–15.4)
FERRITIN SERPL-MCNC: 87 NG/ML (ref 13–150)
FLUAV H1 2009 PAND RNA NPH QL NAA+PROBE: NOT DETECTED
FLUAV H1 HA GENE NPH QL NAA+PROBE: NOT DETECTED
FLUAV H3 RNA NPH QL NAA+PROBE: NOT DETECTED
FLUAV SUBTYP SPEC NAA+PROBE: NOT DETECTED
FLUBV RNA ISLT QL NAA+PROBE: NOT DETECTED
FOLATE SERPL-MCNC: 6.22 NG/ML (ref 4.78–24.2)
GFR SERPL CREATININE-BSD FRML MDRD: 50 ML/MIN/1.73
GLOBULIN UR ELPH-MCNC: 2.4 GM/DL
GLUCOSE SERPL-MCNC: 101 MG/DL (ref 65–99)
GLUCOSE UR STRIP-MCNC: NEGATIVE MG/DL
HADV DNA SPEC NAA+PROBE: NOT DETECTED
HCOV 229E RNA SPEC QL NAA+PROBE: NOT DETECTED
HCOV HKU1 RNA SPEC QL NAA+PROBE: NOT DETECTED
HCOV NL63 RNA SPEC QL NAA+PROBE: NOT DETECTED
HCOV OC43 RNA SPEC QL NAA+PROBE: NOT DETECTED
HCT VFR BLD AUTO: 24.9 % (ref 34–46.6)
HGB BLD-MCNC: 8.1 G/DL (ref 12–15.9)
HGB UR QL STRIP.AUTO: NEGATIVE
HMPV RNA NPH QL NAA+NON-PROBE: NOT DETECTED
HPIV1 RNA SPEC QL NAA+PROBE: NOT DETECTED
HPIV2 RNA SPEC QL NAA+PROBE: NOT DETECTED
HPIV3 RNA NPH QL NAA+PROBE: NOT DETECTED
HPIV4 P GENE NPH QL NAA+PROBE: NOT DETECTED
HYALINE CASTS UR QL AUTO: ABNORMAL /LPF
IMM GRANULOCYTES # BLD AUTO: 0.25 10*3/MM3 (ref 0–0.05)
IMM GRANULOCYTES NFR BLD AUTO: 2.6 % (ref 0–0.5)
INR PPP: 1.19 (ref 0.9–1.1)
IRON 24H UR-MRATE: 27 MCG/DL (ref 37–145)
IRON SATN MFR SERPL: 11 % (ref 20–50)
KETONES UR QL STRIP: NEGATIVE
LEUKOCYTE ESTERASE UR QL STRIP.AUTO: ABNORMAL
LYMPHOCYTES # BLD AUTO: 1.5 10*3/MM3 (ref 0.7–3.1)
LYMPHOCYTES NFR BLD AUTO: 15.8 % (ref 19.6–45.3)
M PNEUMO IGG SER IA-ACNC: NOT DETECTED
MCH RBC QN AUTO: 29.8 PG (ref 26.6–33)
MCHC RBC AUTO-ENTMCNC: 32.5 G/DL (ref 31.5–35.7)
MCV RBC AUTO: 91.5 FL (ref 79–97)
MONOCYTES # BLD AUTO: 1.07 10*3/MM3 (ref 0.1–0.9)
MONOCYTES NFR BLD AUTO: 11.3 % (ref 5–12)
NEUTROPHILS NFR BLD AUTO: 6.59 10*3/MM3 (ref 1.7–7)
NEUTROPHILS NFR BLD AUTO: 69.6 % (ref 42.7–76)
NITRITE UR QL STRIP: NEGATIVE
NRBC BLD AUTO-RTO: 0.1 /100 WBC (ref 0–0.2)
PH UR STRIP.AUTO: <=5 [PH] (ref 5–8)
PLATELET # BLD AUTO: 187 10*3/MM3 (ref 140–450)
PMV BLD AUTO: 10.1 FL (ref 6–12)
POTASSIUM SERPL-SCNC: 4 MMOL/L (ref 3.5–5.2)
PROT SERPL-MCNC: 5.3 G/DL (ref 6–8.5)
PROT UR QL STRIP: ABNORMAL
PROTHROMBIN TIME: 14.9 SECONDS (ref 11.7–14.2)
RBC # BLD AUTO: 2.72 10*6/MM3 (ref 3.77–5.28)
RBC # UR: ABNORMAL /HPF
REF LAB TEST METHOD: ABNORMAL
RETICS # AUTO: 0.07 10*6/MM3 (ref 0.02–0.13)
RETICS/RBC NFR AUTO: 2.55 % (ref 0.7–1.9)
RH BLD: POSITIVE
RHINOVIRUS RNA SPEC NAA+PROBE: NOT DETECTED
RSV RNA NPH QL NAA+NON-PROBE: NOT DETECTED
SARS-COV-2 RNA NPH QL NAA+NON-PROBE: NOT DETECTED
SODIUM SERPL-SCNC: 140 MMOL/L (ref 136–145)
SP GR UR STRIP: 1.02 (ref 1–1.03)
SQUAMOUS #/AREA URNS HPF: ABNORMAL /HPF
T&S EXPIRATION DATE: NORMAL
TIBC SERPL-MCNC: 235 MCG/DL (ref 298–536)
TRANSFERRIN SERPL-MCNC: 158 MG/DL (ref 200–360)
UROBILINOGEN UR QL STRIP: ABNORMAL
VIT B12 BLD-MCNC: 296 PG/ML (ref 211–946)
WBC # BLD AUTO: 9.48 10*3/MM3 (ref 3.4–10.8)
WBC UR QL AUTO: ABNORMAL /HPF

## 2020-09-18 PROCEDURE — 82728 ASSAY OF FERRITIN: CPT | Performed by: INTERNAL MEDICINE

## 2020-09-18 PROCEDURE — 70450 CT HEAD/BRAIN W/O DYE: CPT

## 2020-09-18 PROCEDURE — 86901 BLOOD TYPING SEROLOGIC RH(D): CPT | Performed by: EMERGENCY MEDICINE

## 2020-09-18 PROCEDURE — 82746 ASSAY OF FOLIC ACID SERUM: CPT | Performed by: INTERNAL MEDICINE

## 2020-09-18 PROCEDURE — P9612 CATHETERIZE FOR URINE SPEC: HCPCS

## 2020-09-18 PROCEDURE — 82607 VITAMIN B-12: CPT | Performed by: INTERNAL MEDICINE

## 2020-09-18 PROCEDURE — 85610 PROTHROMBIN TIME: CPT | Performed by: EMERGENCY MEDICINE

## 2020-09-18 PROCEDURE — 81001 URINALYSIS AUTO W/SCOPE: CPT | Performed by: EMERGENCY MEDICINE

## 2020-09-18 PROCEDURE — 85025 COMPLETE CBC W/AUTO DIFF WBC: CPT | Performed by: EMERGENCY MEDICINE

## 2020-09-18 PROCEDURE — 85730 THROMBOPLASTIN TIME PARTIAL: CPT | Performed by: EMERGENCY MEDICINE

## 2020-09-18 PROCEDURE — 86850 RBC ANTIBODY SCREEN: CPT | Performed by: EMERGENCY MEDICINE

## 2020-09-18 PROCEDURE — G0378 HOSPITAL OBSERVATION PER HR: HCPCS

## 2020-09-18 PROCEDURE — 80053 COMPREHEN METABOLIC PANEL: CPT | Performed by: EMERGENCY MEDICINE

## 2020-09-18 PROCEDURE — 93005 ELECTROCARDIOGRAM TRACING: CPT | Performed by: EMERGENCY MEDICINE

## 2020-09-18 PROCEDURE — 93010 ELECTROCARDIOGRAM REPORT: CPT | Performed by: INTERNAL MEDICINE

## 2020-09-18 PROCEDURE — 99285 EMERGENCY DEPT VISIT HI MDM: CPT

## 2020-09-18 PROCEDURE — 85045 AUTOMATED RETICULOCYTE COUNT: CPT | Performed by: INTERNAL MEDICINE

## 2020-09-18 PROCEDURE — 0202U NFCT DS 22 TRGT SARS-COV-2: CPT | Performed by: EMERGENCY MEDICINE

## 2020-09-18 PROCEDURE — 71045 X-RAY EXAM CHEST 1 VIEW: CPT

## 2020-09-18 PROCEDURE — 87086 URINE CULTURE/COLONY COUNT: CPT | Performed by: INTERNAL MEDICINE

## 2020-09-18 PROCEDURE — 86900 BLOOD TYPING SEROLOGIC ABO: CPT | Performed by: EMERGENCY MEDICINE

## 2020-09-18 PROCEDURE — 84466 ASSAY OF TRANSFERRIN: CPT | Performed by: INTERNAL MEDICINE

## 2020-09-18 PROCEDURE — 83540 ASSAY OF IRON: CPT | Performed by: INTERNAL MEDICINE

## 2020-09-18 RX ORDER — POTASSIUM CHLORIDE 750 MG/1
10 CAPSULE, EXTENDED RELEASE ORAL DAILY
Status: DISCONTINUED | OUTPATIENT
Start: 2020-09-19 | End: 2020-09-21 | Stop reason: HOSPADM

## 2020-09-18 RX ORDER — OXYCODONE HYDROCHLORIDE 5 MG/1
2.5 TABLET ORAL EVERY 6 HOURS PRN
COMMUNITY
End: 2020-09-21 | Stop reason: HOSPADM

## 2020-09-18 RX ORDER — LEVOFLOXACIN 750 MG/1
750 TABLET ORAL DAILY
COMMUNITY
Start: 2020-09-17 | End: 2020-09-21 | Stop reason: HOSPADM

## 2020-09-18 RX ORDER — LACTULOSE 10 G/15ML
20 SOLUTION ORAL DAILY PRN
COMMUNITY

## 2020-09-18 RX ORDER — HYDRALAZINE HYDROCHLORIDE 20 MG/ML
10 INJECTION INTRAMUSCULAR; INTRAVENOUS EVERY 8 HOURS PRN
Status: DISCONTINUED | OUTPATIENT
Start: 2020-09-18 | End: 2020-09-19

## 2020-09-18 RX ORDER — SODIUM CHLORIDE 0.9 % (FLUSH) 0.9 %
10 SYRINGE (ML) INJECTION EVERY 12 HOURS SCHEDULED
Status: DISCONTINUED | OUTPATIENT
Start: 2020-09-18 | End: 2020-09-21 | Stop reason: HOSPADM

## 2020-09-18 RX ORDER — FERROUS SULFATE 325(65) MG
325 TABLET ORAL 2 TIMES DAILY
Status: DISCONTINUED | OUTPATIENT
Start: 2020-09-18 | End: 2020-09-21 | Stop reason: HOSPADM

## 2020-09-18 RX ORDER — ACETAMINOPHEN 500 MG
500 TABLET ORAL EVERY 6 HOURS PRN
Status: DISCONTINUED | OUTPATIENT
Start: 2020-09-18 | End: 2020-09-18 | Stop reason: SDUPTHER

## 2020-09-18 RX ORDER — FERROUS SULFATE 325(65) MG
325 TABLET ORAL 2 TIMES DAILY
COMMUNITY

## 2020-09-18 RX ORDER — CARBOXYMETHYLCELLULOSE SODIUM 10 MG/ML
1 SOLUTION/ DROPS OPHTHALMIC 2 TIMES DAILY PRN
COMMUNITY

## 2020-09-18 RX ORDER — CARBOXYMETHYLCELLULOSE SODIUM 10 MG/ML
1 GEL OPHTHALMIC 3 TIMES DAILY PRN
Status: DISCONTINUED | OUTPATIENT
Start: 2020-09-18 | End: 2020-09-21 | Stop reason: HOSPADM

## 2020-09-18 RX ORDER — ACETAMINOPHEN 160 MG/5ML
650 SOLUTION ORAL EVERY 4 HOURS PRN
Status: DISCONTINUED | OUTPATIENT
Start: 2020-09-18 | End: 2020-09-19

## 2020-09-18 RX ORDER — ACETAMINOPHEN 500 MG
500 TABLET ORAL EVERY 6 HOURS PRN
COMMUNITY

## 2020-09-18 RX ORDER — SODIUM CHLORIDE 9 MG/ML
50 INJECTION, SOLUTION INTRAVENOUS CONTINUOUS
Status: DISCONTINUED | OUTPATIENT
Start: 2020-09-18 | End: 2020-09-19

## 2020-09-18 RX ORDER — LACTULOSE 10 G/15ML
20 SOLUTION ORAL DAILY PRN
Status: DISCONTINUED | OUTPATIENT
Start: 2020-09-18 | End: 2020-09-21 | Stop reason: HOSPADM

## 2020-09-18 RX ORDER — SENNA PLUS 8.6 MG/1
2 TABLET ORAL NIGHTLY
COMMUNITY

## 2020-09-18 RX ORDER — BISACODYL 10 MG
10 SUPPOSITORY, RECTAL RECTAL DAILY PRN
COMMUNITY

## 2020-09-18 RX ORDER — NITROGLYCERIN 0.4 MG/1
0.4 TABLET SUBLINGUAL
Status: DISCONTINUED | OUTPATIENT
Start: 2020-09-18 | End: 2020-09-21 | Stop reason: HOSPADM

## 2020-09-18 RX ORDER — ONDANSETRON 2 MG/ML
4 INJECTION INTRAMUSCULAR; INTRAVENOUS EVERY 6 HOURS PRN
Status: DISCONTINUED | OUTPATIENT
Start: 2020-09-18 | End: 2020-09-21 | Stop reason: HOSPADM

## 2020-09-18 RX ORDER — SENNA PLUS 8.6 MG/1
2 TABLET ORAL NIGHTLY
Status: DISCONTINUED | OUTPATIENT
Start: 2020-09-18 | End: 2020-09-21 | Stop reason: HOSPADM

## 2020-09-18 RX ORDER — OXYCODONE HYDROCHLORIDE 5 MG/1
2.5 TABLET ORAL EVERY 6 HOURS PRN
Status: DISCONTINUED | OUTPATIENT
Start: 2020-09-18 | End: 2020-09-21 | Stop reason: HOSPADM

## 2020-09-18 RX ORDER — SODIUM CHLORIDE 0.9 % (FLUSH) 0.9 %
10 SYRINGE (ML) INJECTION AS NEEDED
Status: DISCONTINUED | OUTPATIENT
Start: 2020-09-18 | End: 2020-09-21 | Stop reason: HOSPADM

## 2020-09-18 RX ORDER — ACETAMINOPHEN 325 MG/1
650 TABLET ORAL EVERY 4 HOURS PRN
Status: DISCONTINUED | OUTPATIENT
Start: 2020-09-18 | End: 2020-09-21 | Stop reason: HOSPADM

## 2020-09-18 RX ORDER — ACETAMINOPHEN 650 MG/1
650 SUPPOSITORY RECTAL EVERY 4 HOURS PRN
Status: DISCONTINUED | OUTPATIENT
Start: 2020-09-18 | End: 2020-09-19

## 2020-09-18 RX ORDER — BISACODYL 10 MG
10 SUPPOSITORY, RECTAL RECTAL DAILY PRN
Status: DISCONTINUED | OUTPATIENT
Start: 2020-09-18 | End: 2020-09-21 | Stop reason: HOSPADM

## 2020-09-18 RX ADMIN — METOPROLOL TARTRATE 6.25 MG: 25 TABLET, FILM COATED ORAL at 23:18

## 2020-09-18 RX ADMIN — SENNOSIDES 2 TABLET: 8.6 TABLET, FILM COATED ORAL at 23:18

## 2020-09-18 RX ADMIN — FERROUS SULFATE TAB 325 MG (65 MG ELEMENTAL FE) 325 MG: 325 (65 FE) TAB at 23:17

## 2020-09-18 RX ADMIN — SODIUM CHLORIDE 50 ML/HR: 9 INJECTION, SOLUTION INTRAVENOUS at 21:36

## 2020-09-18 NOTE — ED NOTES
Patient masked in triage and taken to room.  Patient sent from the [facility] for abnormal labs of a Hgb of 7.2     Jared Holden RN  09/18/20 2751

## 2020-09-18 NOTE — PROGRESS NOTES
Clinical Pharmacy Services: Medication History    Gaby Diaz is a 89 y.o. female presenting to [facility] for   Chief Complaint   Patient presents with   • Abnormal Lab     Patient sent from the [facility] for abnormal labs of a Hgb of 7.2       She  has a past medical history of Arthritis of back, Arthritis of neck, Atrial fibrillation (CMS/HCC), Hip arthrosis, Hyperlipidemia, Hypertension, Knee swelling, Myocardial ischemia, and Stroke syndrome.    Allergies as of 09/18/2020   • (No Known Allergies)       Medication information was obtained from: nursing home medication [facility]  Pharmacy and Phone Number:     Prior to Admission Medications     Prescriptions Last Dose Informant Patient Reported? Taking?    acetaminophen (TYLENOL) 500 MG tablet  Nursing Home Yes Yes    Take 500 mg by mouth Every 6 (Six) Hours As Needed for Mild Pain  or Fever.    bisacodyl (DULCOLAX) 10 MG suppository  Nursing Home Yes Yes    Insert 10 mg into the rectum Daily As Needed for Constipation.    carboxymethylcellulose (Artificial Tears) 1 % ophthalmic solution  Nursing Home Yes Yes    Administer 1 drop to both eyes 2 (Two) Times a Day As Needed.    ferrous sulfate 325 (65 FE) MG tablet  Nursing Home Yes Yes    Take 325 mg by mouth 2 (two) times a day.    lactulose (CHRONULAC) 10 GM/15ML solution  Nursing Home Yes Yes    Take 20 g by mouth Daily As Needed.    levoFLOXacin (LEVAQUIN) 750 MG tablet  Nursing Home Yes Yes    Take 750 mg by mouth Daily.    metoprolol tartrate (LOPRESSOR) 25 MG tablet  Nursing Home Yes Yes    Take 6.25 mg by mouth 2 (Two) Times a Day.    oxyCODONE (ROXICODONE) 5 MG immediate release tablet  Nursing Home Yes Yes    Take 2.5 mg by mouth Every 6 (Six) Hours As Needed for Moderate Pain .    potassium chloride (K-DUR,KLOR-CON) 20 MEQ CR tablet  Nursing Home No Yes    Take 1 tablet by mouth 2 (Two) Times a Day.    senna (senna) 8.6 MG tablet  Nursing Home Yes Yes    Take 2 tablets by mouth  Every Night.            Medication notes:     This medication list is complete to the best of my knowledge as of 9/18/2020    Please call if questions.    Amaya Leone [facility]  Medication History Technician  [phone]    9/18/2020 19:36 EDT

## 2020-09-18 NOTE — ED PROVIDER NOTES
EMERGENCY DEPARTMENT ENCOUNTER    Room Number:  23/23  Date of encounter:  9/18/2020  PCP: Arsh Ford MD  Historian: Patient, [facility]      HPI:  Chief Complaint: Low hemoglobin  A complete HPI/ROS/PMH/PSH/SH/FH are unobtainable due to: None    Context: Gaby Diaz is a 89 y.o. female who presents to the ED c/o: Low hemoglobin.  Patient was reportedly sent here for low hemoglobin.  Per outside records, her hemoglobin is 7.6 which is the same that has been for the past 9 days.  Patient states that she is asymptomatic and has no complaints.  He states that she feels well.  The patient's nurse at her [facility] state that she showed some signs of confusion and that she did not recognize her family this morning.  Patient denies having any associated blood in her stool.  Her states that she has not noticed any.      PAST MEDICAL HISTORY  Active Ambulatory Problems     Diagnosis Date Noted   • Atrial fibrillation (CMS/HCC) 07/15/2016   • Mixed hyperlipidemia 07/15/2016   • Essential hypertension 07/15/2016     Resolved Ambulatory Problems     Diagnosis Date Noted   • Encounter for rehabilitation 08/25/2017     Past Medical History:   Diagnosis Date   • Arthritis of back    • Arthritis of neck    • Hip arthrosis    • Hyperlipidemia    • Hypertension    • Knee swelling    • Myocardial ischemia    • Stroke syndrome          PAST SURGICAL HISTORY  Past Surgical History:   Procedure Laterality Date   • HIP SURGERY     • REPLACEMENT TOTAL KNEE Bilateral          FAMILY HISTORY  Family History   Problem Relation Age of Onset   • Hypertension Mother    • Heart attack Father    • Heart attack Brother    • Hypertension Brother    • Stroke Brother    • Heart attack Daughter          SOCIAL HISTORY  Social History     Socioeconomic History   • Marital status:      Spouse name: Not on file   • Number of children: Not on file   • Years of education: Not on file   • Highest education  level: Not on file   Tobacco Use   • Smoking status: Never Smoker   • Smokeless tobacco: Never Used   • Tobacco comment: caff use: 1 cup   Substance and Sexual Activity   • Alcohol use: No   • Drug use: No         ALLERGIES  Patient has no known allergies.        REVIEW OF SYSTEMS  Review of Systems     All systems reviewed and negative except for those discussed in HPI.       PHYSICAL EXAM    I have reviewed the triage vital signs and nursing notes.    ED Triage Vitals [09/18/20 1434]   Temp Heart Rate Resp BP SpO2   98.6 °F (37 °C) 110 18 114/70 96 %      Temp src Heart Rate Source Patient Position BP Location FiO2 (%)   Tympanic Monitor Lying Left arm --       Physical Exam  GENERAL: not distressed  HENT: nares patent  EYES: no scleral icterus, conjunctival pallor  CV: regular rhythm, regular rate  RESPIRATORY: normal effort, clear to auscultation bilaterally  ABDOMEN: soft, nontender  RECTAL: Heme-negative  MUSCULOSKELETAL: no deformity  NEURO: alert, oriented to name, hospital, year and president without any hesitation, moves all extremities, follows commands, symmetric smile  SKIN: warm, dry        LAB RESULTS  Recent Results (from the past 24 hour(s))   Comprehensive Metabolic Panel    Collection Time: 09/18/20  3:11 PM    Specimen: Blood   Result Value Ref Range    Glucose 101 (H) 65 - 99 mg/dL    BUN 21 8 - 23 mg/dL    Creatinine 1.03 (H) 0.57 - 1.00 mg/dL    Sodium 140 136 - 145 mmol/L    Potassium 4.0 3.5 - 5.2 mmol/L    Chloride 105 98 - 107 mmol/L    CO2 26.6 22.0 - 29.0 mmol/L    Calcium 8.0 (L) 8.6 - 10.5 mg/dL    Total Protein 5.3 (L) 6.0 - 8.5 g/dL    Albumin 2.90 (L) 3.50 - 5.20 g/dL    ALT (SGPT) 8 1 - 33 U/L    AST (SGOT) 14 1 - 32 U/L    Alkaline Phosphatase 120 (H) 39 - 117 U/L    Total Bilirubin 0.4 0.0 - 1.2 mg/dL    eGFR Non African Amer 50 (L) >60 mL/min/1.73    Globulin 2.4 gm/dL    A/G Ratio 1.2 g/dL    BUN/Creatinine Ratio 20.4 7.0 - 25.0    Anion Gap 8.4 5.0 - 15.0 mmol/L   Type &  Screen    Collection Time: 09/18/20  3:11 PM    Specimen: Blood   Result Value Ref Range    ABO Type A     RH type Positive     Antibody Screen Negative     T&S Expiration Date 9/21/2020 11:59:59 PM    CBC Auto Differential    Collection Time: 09/18/20  3:12 PM    Specimen: Blood   Result Value Ref Range    WBC 9.48 3.40 - 10.80 10*3/mm3    RBC 2.72 (L) 3.77 - 5.28 10*6/mm3    Hemoglobin 8.1 (L) 12.0 - 15.9 g/dL    Hematocrit 24.9 (L) 34.0 - 46.6 %    MCV 91.5 79.0 - 97.0 fL    MCH 29.8 26.6 - 33.0 pg    MCHC 32.5 31.5 - 35.7 g/dL    RDW 14.2 12.3 - 15.4 %    RDW-SD 46.7 37.0 - 54.0 fl    MPV 10.1 6.0 - 12.0 fL    Platelets 187 140 - 450 10*3/mm3    Neutrophil % 69.6 42.7 - 76.0 %    Lymphocyte % 15.8 (L) 19.6 - 45.3 %    Monocyte % 11.3 5.0 - 12.0 %    Eosinophil % 0.5 0.3 - 6.2 %    Basophil % 0.2 0.0 - 1.5 %    Immature Grans % 2.6 (H) 0.0 - 0.5 %    Neutrophils, Absolute 6.59 1.70 - 7.00 10*3/mm3    Lymphocytes, Absolute 1.50 0.70 - 3.10 10*3/mm3    Monocytes, Absolute 1.07 (H) 0.10 - 0.90 10*3/mm3    Eosinophils, Absolute 0.05 0.00 - 0.40 10*3/mm3    Basophils, Absolute 0.02 0.00 - 0.20 10*3/mm3    Immature Grans, Absolute 0.25 (H) 0.00 - 0.05 10*3/mm3    nRBC 0.1 0.0 - 0.2 /100 WBC   Urinalysis With Microscopic If Indicated (No Culture) - Urine, Clean Catch    Collection Time: 09/18/20  4:42 PM    Specimen: Urine, Clean Catch   Result Value Ref Range    Color, UA Yellow Yellow, Straw    Appearance, UA Clear Clear    pH, UA <=5.0 5.0 - 8.0    Specific Gravity, UA 1.025 1.005 - 1.030    Glucose, UA Negative Negative    Ketones, UA Negative Negative    Bilirubin, UA Negative Negative    Blood, UA Negative Negative    Protein,  mg/dL (2+) (A) Negative    Leuk Esterase, UA Large (3+) (A) Negative    Nitrite, UA Negative Negative    Urobilinogen, UA 1.0 E.U./dL 0.2 - 1.0 E.U./dL   Urinalysis, Microscopic Only - Urine, Clean Catch    Collection Time: 09/18/20  4:42 PM    Specimen: Urine, Clean Catch   Result  Value Ref Range    RBC, UA 0-2 None Seen, 0-2 /HPF    WBC, UA Too Numerous to Count (A) None Seen, 0-2 /HPF    Bacteria, UA None Seen None Seen /HPF    Squamous Epithelial Cells, UA 0-2 None Seen, 0-2 /HPF    Hyaline Casts, UA 0-2 None Seen /LPF    Methodology Automated Microscopy    Protime-INR    Collection Time: 09/18/20  5:42 PM    Specimen: Blood   Result Value Ref Range    Protime 14.9 (H) 11.7 - 14.2 Seconds    INR 1.19 (H) 0.90 - 1.10   aPTT    Collection Time: 09/18/20  5:42 PM    Specimen: Blood   Result Value Ref Range    PTT 32.7 22.7 - 35.4 seconds       Ordered the above labs and independently reviewed the results.        RADIOLOGY  Ct Head Without Contrast    Result Date: 9/18/2020  EXAM:  CT HEAD WO CONTRAST-  HISTORY:  Transient confusion.  COMPARISON:  None.  TECHNIQUE: Noncontrast images of the brain were obtained. Reformatted images were reviewed. Radiation dose reduction techniques were utilized, including automated exposure control and exposure modulation based on body size.  FINDINGS:   There is small volume intraventricular hemorrhage layering within the occipital horns bilaterally, right slightly greater than left. There is mild curvilinear hyperattenuation within the posterior right frontal lobe with an area of encephalomalacia/gliosis, which could be due to mineralization from laminar necrosis or blood products. No pathologic extra-axial fluid collection is identified. There are moderate to large areas of encephalomalacia/gliosis involving the right MCA territory, left occipital lobe, and bilateral cerebellar hemispheres. There is a chronic infarct involving the right caudate body. These findings are superimposed on at least moderate small vessel ischemic disease, overall limiting evaluation for detection of acute infarction. There is ex vacuo dilatation of the lateral ventricles, right greater than left. There is background moderate to advanced global parenchymal volume loss. There is a  partially empty sella. There is extensive calcific intracranial atherosclerosis with involvement of the posterior circulation. There are bilateral ocular lens replacements. There is near complete opacification of the right sphenoid sinus with surrounding osteitis, suggestive of chronicity. There is moderate right and mild left mucosal thickening of the maxillary sinuses. Associated hyperattenuation within the right maxillary sinus can be seen with fungal colonization or inspissated secretions. There is bilateral temporomandibular joint disease. There are old fractures of the right orbital floor, right lateral orbital wall, and maxillary sinus walls, likely from old ZMC fracture.      1.  Small volume bilateral intraventricular hemorrhage, right slightly greater than left. 2.  Extensive encephalomalacia/gliosis and background small vessel ischemic disease limit evaluation for acute infarction. An area of curvilinear hyperattenuation within the posterior right frontal lobe, along an area of encephalomalacia/gliosis, may represent mineralization from limited necrosis versus blood products. Comparison with prior imaging would be helpful, if available. Further evaluation with contrast-enhanced MRI is recommended. 3.  Old right facial bone fractures likely secondary to remote ZMC fracture. 4.  Hyperattenuation associated with mucosal thickening in the right maxillary sinus and sphenoid sinus can be seen with fungal colonization or inspissated secretions.  Emergent findings discussed with Dr. Alvarenga at 4:45 PM.  This report was finalized on 9/18/2020 4:52 PM by Dr. Nell Crisostomo M.D.      Xr Chest 1 View    Result Date: 9/18/2020  ONE VIEW PORTABLE CHEST  HISTORY: Confusion. Hypertension.  FINDINGS: There are no prior exams for comparison. The lungs are moderately well-expanded with what appears to be some localized pleural effusion and probable atelectasis and pneumonia at the right base. There is a large hiatus hernia  extending more into the left chest and measuring up to 14.5 cm. The heart is mildly enlarged without overt CHF.  This report was finalized on 9/18/2020 4:27 PM by Dr. Jose Garcia M.D.        I ordered the above noted radiological studies. Reviewed by me and discussed with radiologist.  See dictation for official radiology interpretation.      PROCEDURES    Procedures      MEDICATIONS GIVEN IN ER    Medications - No data to display      PROGRESS, DATA ANALYSIS, CONSULTS, AND MEDICAL DECISION MAKING    All labs have been independently reviewed by me.  All radiology studies have been reviewed by me and discussed with radiologist dictating the report.   EKG's independently viewed and interpreted by me.  Discussion below represents my analysis of pertinent findings related to patient's condition, differential diagnosis, treatment plan and final disposition.    Differential diagnosis for altered mental status includes:  - vital sign abnormalities such as HTN encephalopathy, hypotension, hypoxemia, hypercarbia, heat stroke  - toxic/metabolic pathology such as hypoglycemia, DKA, hypo/hyper-natremia, thyroid storm, myxedema coma, medication side effect (either intentional or accidental)  - infectious etiology  - intracranial pathology such as stroke, seizure, intracranial mass, intracranial hemorrhage  - psychiatric pathology    Patient has a well-appearing neuro exam.  She seems to be at her baseline self right now based on description from nurse.    ED Course as of Sep 18 1850   Fri Sep 18, 2020   1531 Hemoglobin(!): 8.1 [TD]   1540 Obtain additional medical records from the patient's facility.  She has a hemoglobin of 7.6 that was obtained this morning.  Is been within 0.2 range for the past 9 days.  Therefore, I called the nurse recently with a care of the patient. Her name is Madeline.  She states the patient was sent here primarily because of low hemoglobin.  Additionally, she showed some signs of being confused,  namely she trouble recognizing family who visited her this morning.  Normally she is alert to her location, year and president.    [TD]   1631 EKG interpreted by myself.  Time 1553.  Atrial fibrillation.  Heart rate 98.  Normal intervals and axis.  No acute ST abnormality.    [TD]   1632 On medical chart review, old EKG obtained from 1/13/2020.  Atrial fibrillation.  Heart rate 119.  Left axis deviation.  No acute ST normality.    [TD]   1645 I discussed the CT head imaging with Dr. Nell Crisostomo.  She has acute intraventricular hemorrhage.    [TD]   1745 I discussed with Dr. Romero, neurosurgery.  It is unclear if this is truly acute hemorrhage or lingering subacute hemorrhage.  Nonetheless, he recommends observation.    [TD]   1812 I discussed with Dr. Chambers who will admit.  Will admit to telemetry.    [TD]      ED Course User Index  [TD] Cesar Alvarenga II, MD           PPE: Both the patient and I wore a surgical mask throughout the entire patient encounter. I wore protective goggles.     AS OF 18:50 EDT VITALS:    BP - 159/98  HR - 104  TEMP - 98.6 °F (37 °C) (Tympanic)  O2 SATS - 96%        DIAGNOSIS  Final diagnoses:   Intraventricular hemorrhage (CMS/HCC)   Anemia, unspecified type         DISPOSITION  Admit           Cesar Alvarenga II, MD  09/18/20 2507

## 2020-09-19 ENCOUNTER — APPOINTMENT (OUTPATIENT)
Dept: CT IMAGING | Facility: HOSPITAL | Age: 85
End: 2020-09-19

## 2020-09-19 LAB
ALBUMIN SERPL-MCNC: 2.8 G/DL (ref 3.5–5.2)
ALBUMIN/GLOB SERPL: 1.2 G/DL
ALP SERPL-CCNC: 122 U/L (ref 39–117)
ALT SERPL W P-5'-P-CCNC: 6 U/L (ref 1–33)
ANION GAP SERPL CALCULATED.3IONS-SCNC: 8.6 MMOL/L (ref 5–15)
AST SERPL-CCNC: 13 U/L (ref 1–32)
BASOPHILS # BLD AUTO: 0.02 10*3/MM3 (ref 0–0.2)
BASOPHILS NFR BLD AUTO: 0.2 % (ref 0–1.5)
BILIRUB SERPL-MCNC: 0.4 MG/DL (ref 0–1.2)
BUN SERPL-MCNC: 18 MG/DL (ref 8–23)
BUN/CREAT SERPL: 21.4 (ref 7–25)
CALCIUM SPEC-SCNC: 8.2 MG/DL (ref 8.6–10.5)
CHLORIDE SERPL-SCNC: 110 MMOL/L (ref 98–107)
CO2 SERPL-SCNC: 24.4 MMOL/L (ref 22–29)
CREAT SERPL-MCNC: 0.84 MG/DL (ref 0.57–1)
DEPRECATED RDW RBC AUTO: 47 FL (ref 37–54)
EOSINOPHIL # BLD AUTO: 0.09 10*3/MM3 (ref 0–0.4)
EOSINOPHIL NFR BLD AUTO: 1.1 % (ref 0.3–6.2)
ERYTHROCYTE [DISTWIDTH] IN BLOOD BY AUTOMATED COUNT: 13.9 % (ref 12.3–15.4)
GFR SERPL CREATININE-BSD FRML MDRD: 64 ML/MIN/1.73
GLOBULIN UR ELPH-MCNC: 2.4 GM/DL
GLUCOSE SERPL-MCNC: 81 MG/DL (ref 65–99)
HCT VFR BLD AUTO: 26.1 % (ref 34–46.6)
HGB BLD-MCNC: 8.3 G/DL (ref 12–15.9)
IMM GRANULOCYTES # BLD AUTO: 0.17 10*3/MM3 (ref 0–0.05)
IMM GRANULOCYTES NFR BLD AUTO: 2 % (ref 0–0.5)
LYMPHOCYTES # BLD AUTO: 1.82 10*3/MM3 (ref 0.7–3.1)
LYMPHOCYTES NFR BLD AUTO: 21.3 % (ref 19.6–45.3)
MCH RBC QN AUTO: 29.2 PG (ref 26.6–33)
MCHC RBC AUTO-ENTMCNC: 31.8 G/DL (ref 31.5–35.7)
MCV RBC AUTO: 91.9 FL (ref 79–97)
MONOCYTES # BLD AUTO: 0.97 10*3/MM3 (ref 0.1–0.9)
MONOCYTES NFR BLD AUTO: 11.4 % (ref 5–12)
NEUTROPHILS NFR BLD AUTO: 5.46 10*3/MM3 (ref 1.7–7)
NEUTROPHILS NFR BLD AUTO: 64 % (ref 42.7–76)
NRBC BLD AUTO-RTO: 0.1 /100 WBC (ref 0–0.2)
PLATELET # BLD AUTO: 194 10*3/MM3 (ref 140–450)
PMV BLD AUTO: 10.4 FL (ref 6–12)
POTASSIUM SERPL-SCNC: 4.1 MMOL/L (ref 3.5–5.2)
PROT SERPL-MCNC: 5.2 G/DL (ref 6–8.5)
RBC # BLD AUTO: 2.84 10*6/MM3 (ref 3.77–5.28)
SODIUM SERPL-SCNC: 143 MMOL/L (ref 136–145)
WBC # BLD AUTO: 8.53 10*3/MM3 (ref 3.4–10.8)

## 2020-09-19 PROCEDURE — G0378 HOSPITAL OBSERVATION PER HR: HCPCS

## 2020-09-19 PROCEDURE — 80053 COMPREHEN METABOLIC PANEL: CPT | Performed by: INTERNAL MEDICINE

## 2020-09-19 PROCEDURE — 97162 PT EVAL MOD COMPLEX 30 MIN: CPT | Performed by: PHYSICAL THERAPIST

## 2020-09-19 PROCEDURE — 85025 COMPLETE CBC W/AUTO DIFF WBC: CPT | Performed by: INTERNAL MEDICINE

## 2020-09-19 PROCEDURE — 96372 THER/PROPH/DIAG INJ SC/IM: CPT

## 2020-09-19 PROCEDURE — 25010000002 CYANOCOBALAMIN PER 1000 MCG: Performed by: HOSPITALIST

## 2020-09-19 PROCEDURE — 99202 OFFICE O/P NEW SF 15 MIN: CPT | Performed by: NEUROLOGICAL SURGERY

## 2020-09-19 PROCEDURE — 97110 THERAPEUTIC EXERCISES: CPT | Performed by: PHYSICAL THERAPIST

## 2020-09-19 PROCEDURE — 70450 CT HEAD/BRAIN W/O DYE: CPT

## 2020-09-19 PROCEDURE — 92610 EVALUATE SWALLOWING FUNCTION: CPT

## 2020-09-19 RX ORDER — CYANOCOBALAMIN 1000 UG/ML
1000 INJECTION, SOLUTION INTRAMUSCULAR; SUBCUTANEOUS DAILY
Status: COMPLETED | OUTPATIENT
Start: 2020-09-19 | End: 2020-09-21

## 2020-09-19 RX ORDER — SODIUM CHLORIDE 9 MG/ML
50 INJECTION, SOLUTION INTRAVENOUS CONTINUOUS
Status: DISCONTINUED | OUTPATIENT
Start: 2020-09-19 | End: 2020-09-20

## 2020-09-19 RX ADMIN — FERROUS SULFATE TAB 325 MG (65 MG ELEMENTAL FE) 325 MG: 325 (65 FE) TAB at 10:06

## 2020-09-19 RX ADMIN — SODIUM CHLORIDE, PRESERVATIVE FREE 10 ML: 5 INJECTION INTRAVENOUS at 22:04

## 2020-09-19 RX ADMIN — SODIUM CHLORIDE 50 ML/HR: 9 INJECTION, SOLUTION INTRAVENOUS at 18:21

## 2020-09-19 RX ADMIN — CYANOCOBALAMIN 1000 MCG: 1000 INJECTION, SOLUTION INTRAMUSCULAR at 18:22

## 2020-09-19 RX ADMIN — POTASSIUM CHLORIDE 10 MEQ: 10 CAPSULE, COATED, EXTENDED RELEASE ORAL at 10:06

## 2020-09-19 RX ADMIN — SENNOSIDES 2 TABLET: 8.6 TABLET, FILM COATED ORAL at 22:03

## 2020-09-19 RX ADMIN — FERROUS SULFATE TAB 325 MG (65 MG ELEMENTAL FE) 325 MG: 325 (65 FE) TAB at 22:03

## 2020-09-19 RX ADMIN — METOPROLOL TARTRATE 6.25 MG: 25 TABLET, FILM COATED ORAL at 22:03

## 2020-09-19 RX ADMIN — METOPROLOL TARTRATE 6.25 MG: 25 TABLET, FILM COATED ORAL at 10:06

## 2020-09-19 NOTE — PLAN OF CARE
Problem: Adult Inpatient Plan of Care  Goal: Plan of Care Review  Flowsheets (Taken 9/19/2020 7753)  Outcome Summary: Clinical swallowing evaluation completed. Pt presents with suspected mild-moderate oropharyngeal dysphagia characterized by slowed and impaired oral transit (causing suspected premature spillage) and delayed swallow initiation and impaired airway closure indicated by overt coughing during trial of mechanical soft solid and throat clearing after trials of thin by straw. During trial of mechanical soft solid, suspect spillage of portion of trial while pt still completing mastication/bolus formation resulting in overt coughing/choking. It took time for pt to stop coughing and recover after trial. Pt exhibited no overt s/s of penetration/aspiration during any trials of puree solids or thin by cup. Recommend puree solid diet and thin liquids by cup only. No straws and fully upright posture with small bites/drinks. Meds whole in puree if small, crushed if large.

## 2020-09-19 NOTE — PLAN OF CARE
Goal Outcome Evaluation:  Plan of Care Reviewed With: patient  Progress: no change  Outcome Summary: Pt admitted this shift. A&O x 4. VSS on RA. NIH 1 for mild aphasia. Passed bedside dysphagia screen. Pt up to bedside commode once with assist x 2, very unsteady. Will CTM.

## 2020-09-19 NOTE — THERAPY EVALUATION
Acute Care - Speech Language Pathology   Swallow Initial Evaluation [facility]     Patient Name: Gaby Diaz  : 2/10/1931  MRN: [id number]  Today's Date: 2020               Admit Date: 2020    Visit Dx:     ICD-10-CM ICD-9-CM   1. Intraventricular hemorrhage (CMS/HCC)  I61.5 431   2. Anemia, unspecified type  D64.9 285.9     Patient Active Problem List   Diagnosis   • Atrial fibrillation (CMS/HCC)   • Mixed hyperlipidemia   • Essential hypertension   • Intraventricular hemorrhage (CMS/HCC)   • Fall -    • Anemia     Past Medical History:   Diagnosis Date   • Arthritis of back    • Arthritis of neck    • Atrial fibrillation (CMS/HCC)    • Hip arthrosis    • Hyperlipidemia    • Hypertension    • Knee swelling    • Myocardial ischemia     inferior wall   • Stroke syndrome      Past Surgical History:   Procedure Laterality Date   • HIP SURGERY     • REPLACEMENT TOTAL KNEE Bilateral         SWALLOW EVALUATION (last 72 hours)      SLP Adult Swallow Evaluation     Row Name 20 0141          Document Type  evaluation  -ML    Subjective Information  no complaints  -ML    Patient Observations  alert;cooperative  -ML    Care Plan Review  evaluation/treatment results reviewed  -ML    Patient Effort  good  -ML    Symptoms Noted During/After Treatment  none  -ML          Patient Profile Reviewed  yes  -ML    Pertinent History Of Current Problem  Per chart, pt with medical hx of recent fall resulting in intraventricular hemorrhage which was evaluated at another facility admitted this date because of low hemoglobin and worsening confusion.  Head CT concerning for evolving traumatic subarachnoid hemorrhage and intraventricular hemorrhage per chart.   -ML    Current Method of Nutrition  NPO failed screen  -ML    Precautions/Limitations, Vision  WFL;for purposes of eval  -ML    Precautions/Limitations, Hearing  WFL;for purposes of eval  -ML    Prior Level of Function-Communication  -- unknown  -ML     Prior Level of Function-Swallowing  no diet consistency restrictions per pt, ?reliable report  -ML    Plans/Goals Discussed with  patient;agreed upon  -ML    Patient's Goals for Discharge  patient did not state  -ML          Additional Documentation  Pain Scale: Numbers Pre/Post-Treatment (Group)  -ML          Pretreatment Pain Rating  0/10 - no pain  -ML    Posttreatment Pain Rating  0/10 - no pain  -ML          Dentition Assessment  natural, present and adequate;missing teeth  -ML    Secretion Management  WNL/WFL  -ML    Mucosal Quality  moist, healthy  -ML          Oral Motor General Assessment  generalized oral motor weakness  -ML    Oral Motor, Comment  Pt exhibiting difficulty lateralizing tongue into cheeks and exhibited decreased ROM of labial retraction and protrusion. Pt appeared to exhibit mild left facial droop.   -ML          Respiratory Support Currently in Use  room air  -ML    Eating/Swallowing Skills  self-fed;fed by SLP  -ML    Positioning During Eating  upright in bed;upright 90 degree  -ML    Utensils Used  spoon;cup;straw  -ML    Consistencies Trialed  mechanical soft, no mixed consistencies;pureed;thin liquids  -ML          Clinical Swallow Evaluation Summary  Clinical swallowing evaluation completed. Pt presents with suspected mild-moderate oropharyngeal dysphagia characterized by slowed and impaired oral transit (causing suspected premature spillage) and delayed swallow initiation and impaired airway closure indicated by overt coughing during trial of mechanical soft solid and throat clearing after trials of thin by straw. During trial of mechanical soft solid, suspect spillage of portion of trial while pt still completing mastication/bolus formation resulting in overt coughing/choking. It took time for pt to stop coughing and recover after trial. Pt exhibited no overt s/s of penetration/aspiration during any trials of puree solids or thin by cup. Recommend puree solid diet and thin  liquids by cup only. No straws and fully upright posture with small bites/drinks. Meds whole in puree if small, crushed if large.   -ML          SLP Swallowing Diagnosis  mild-moderate;oral dysfunction;suspected pharyngeal dysfunction  -ML    Functional Impact  risk of aspiration/pneumonia  -ML    Rehab Potential/Prognosis, Swallowing  good, to achieve stated therapy goals  -ML    Swallow Criteria for Skilled Therapeutic Interventions Met  demonstrates skilled criteria  -ML          Therapy Frequency (Swallow)  PRN  -ML    Predicted Duration Therapy Intervention (Days)  until discharge  -ML    SLP Diet Recommendation  puree;thin liquids  -ML    Recommended Diagnostics  reassess via clinical swallow evaluation  -ML    Recommended Precautions and Strategies  upright posture during/after eating;small bites of food and sips of liquid;no straw  -ML    SLP Rec. for Method of Medication Administration  meds whole;meds crushed;with pudding or applesauce  -ML    Monitor for Signs of Aspiration  yes;notify SLP if any concerns  -ML    Anticipated Dischage Disposition (SLP)  anticipate therapy at next level of care  -ML          Oral Nutrition/Hydration Goal Selection (SLP)  oral nutrition/hydration, SLP goal 1  -ML          Oral Nutrition/Hydration Goal 1, SLP  Pt will safely swallow least restrictive diet without overt s/s of penetration/aspiration.   -ML    Time Frame (Oral Nutrition/Hydration Goal 1, SLP)  by discharge  -ML      User Key  (r) = Recorded By, (t) = Taken By, (c) = Cosigned By    Initials Name Effective Dates    ML Lisa Lane MS CCC-SLP 10/04/18 -           EDUCATION  The patient has been educated in the following areas:   Dysphagia (Swallowing Impairment).    SLP Recommendation and Plan        Patient was not wearing a face mask during this therapy encounter. Therapist used appropriate personal protective equipment including mask, eye protection and gloves.  Mask used was standard procedure mask.  Appropriate PPE was worn during the entire therapy session. Hand hygiene was completed before and after therapy session. Patient is not in enhanced droplet precautions.            Outcome Summary: Clinical swallowing evaluation completed. Pt presents with suspected mild-moderate oropharyngeal dysphagia characterized by slowed and impaired oral transit (causing suspected premature spillage) and delayed swallow initiation and impaired airway closure indicated by overt coughing during trial of mechanical soft solid and throat clearing after trials of thin by straw. During trial of mechanical soft solid, suspect spillage of portion of trial while pt still completing mastication/bolus formation resulting in overt coughing/choking. It took time for pt to stop coughing and recover after trial. Pt exhibited no overt s/s of penetration/aspiration during any trials of puree solids or thin by cup. Recommend puree solid diet and thin liquids by cup only. No straws and fully upright posture with small bites/drinks. Meds whole in puree if small, crushed if large.    SLP GOALS     Row Name 09/19/20 0141             Oral Nutrition/Hydration Goal 1 (SLP)    Oral Nutrition/Hydration Goal 1, SLP  Pt will safely swallow least restrictive diet without overt s/s of penetration/aspiration.   -ML      Time Frame (Oral Nutrition/Hydration Goal 1, SLP)  by discharge  -ML        User Key  (r) = Recorded By, (t) = Taken By, (c) = Cosigned By    Initials Name Provider Type    Lisa Kelley MS CCC-SLP Speech and Language Pathologist           SLP Outcome Measures (last 72 hours)      SLP Outcome Measures     Row Name 09/19/20 1500             SLP Outcome Measures    Outcome Measure Used?  Adult NOMS  -ML         Adult FCM Scores    FCM Chosen  Swallowing  -ML      Swallowing FCM Score  4  -ML        User Key  (r) = Recorded By, (t) = Taken By, (c) = Cosigned By    Initials Name Effective Dates    Lisa Kelley MS CCC-SLP  10/04/18 -            Time Calculation:   Time Calculation- SLP     Row Name 09/19/20 1506             Time Calculation- SLP    SLP Start Time  1415  -ML      SLP Received On  09/19/20  -ML        User Key  (r) = Recorded By, (t) = Taken By, (c) = Cosigned By    Initials Name Provider Type    Lisa Kelley MS CCC-SLP Speech and Language Pathologist          Therapy Charges for Today     Code Description Service Date Service Provider Modifiers Qty    [id number] HC ST EVAL ORAL PHARYNG SWALLOW 3 9/19/2020 Lisa Lane MS CCC-SLP GN 1               MS JUNIOR Sanchez  9/19/2020

## 2020-09-19 NOTE — PLAN OF CARE
Problem: Adult Inpatient Plan of Care  Goal: Plan of Care Review  Recent Flowsheet Documentation  Taken 9/19/2020 1436 by Mer Frausto, PT  Plan of Care Reviewed With: patient  Outcome Summary: PT admitted from faciltiy with intraventricular hemorrhage. Pt presents with limited ROM of both knees, generalized weakness, impaired balance and unsteady gait. SHe required mod A for bed mobility and MIn ax 2 to ambualte a short distance with a Rwx and pivot to the chair. Pt would benefit from PT to address these imapirments and work towards the outlined goals.

## 2020-09-19 NOTE — PROGRESS NOTES
Name: Gaby Diaz ADMIT: 2020   : 2/10/1931  PCP: Arsh Ford MD    MRN: [id number] LOS: 1 days   AGE/SEX: 89 y.o. female  ROOM: [facility]     Subjective   Subjective   Sleeping but awakens easily.  Answers most questions appropriately.  Only complaint is she feels cold.  She does not know exactly why she is here.  She does not remember falling last month.    Review of Systems   Unable to perform ROS: Dementia        Objective   Objective   Vital Signs  Temp:  [97.2 °F (36.2 °C)-98.6 °F (37 °C)] 97.2 °F (36.2 °C)  Heart Rate:  [] 94  Resp:  [16-18] 16  BP: (114-163)/() 161/99  SpO2:  [91 %-99 %] 96 %  on   ;   Device (Oxygen Therapy): room air  Body mass index is 20.14 kg/m².  Physical Exam  Vitals signs and nursing note reviewed.   Constitutional:       General: She is not in acute distress.     Appearance: She is cachectic. She is ill-appearing (Chronically).   Neck:      Vascular: No JVD.      Trachea: No tracheal deviation.   Cardiovascular:      Rate and Rhythm: Normal rate. Rhythm irregular.   Pulmonary:      Effort: Pulmonary effort is normal. No respiratory distress.      Breath sounds: Normal breath sounds.   Abdominal:      General: Bowel sounds are normal. There is no distension.      Palpations: Abdomen is soft.      Tenderness: There is no abdominal tenderness.   Skin:     General: Skin is warm and dry.   Neurological:      Mental Status: She is disoriented.   Psychiatric:         Behavior: Behavior normal. Behavior is cooperative.         Cognition and Memory: Cognition is impaired.         Results Review     I reviewed the patient's new clinical results.  Results from last 7 days   Lab Units 20  0447 20  1512 20  1147   WBC 10*3/mm3 8.53 9.48 8.61   HEMOGLOBIN g/dL 8.3* 8.1* 8.8*   PLATELETS 10*3/mm3 194 187 234     Results from last 7 days   Lab Units 20  0447 20  1511   SODIUM mmol/L 143 140   POTASSIUM mmol/L 4.1 4.0   CHLORIDE mmol/L  110* 105   CO2 mmol/L 24.4 26.6   BUN mg/dL 18 21   CREATININE mg/dL 0.84 1.03*   GLUCOSE mg/dL 81 101*   Estimated Creatinine Clearance: 39.4 mL/min (by C-G formula based on SCr of 0.84 mg/dL).  Results from last 7 days   Lab Units 09/19/20  0447 09/18/20  1511   ALBUMIN g/dL 2.80* 2.90*   BILIRUBIN mg/dL 0.4 0.4   ALK PHOS U/L 122* 120*   AST (SGOT) U/L 13 14   ALT (SGPT) U/L 6 8     Results from last 7 days   Lab Units 09/19/20  0447 09/18/20  1511   CALCIUM mg/dL 8.2* 8.0*   ALBUMIN g/dL 2.80* 2.90*       COVID19   Date Value Ref Range Status   09/18/2020 Not Detected Not Detected - Ref. Range Final     No results found for: HGBA1C, POCGLU    CT Head Without Contrast  CT SCAN OF THE BRAIN WITHOUT CONTRAST     HISTORY: Transient confusion. Follow-up of intraventricular hemorrhage.  The CT scan was performed through the brain without contrast and  compared to yesterday's exam and again demonstrates extensive diffuse  atrophy and chronic small vessel ischemic change. Again noted is a tiny  amount of acute blood layering in both occipital horns without change.  There appears to be an area of old infarction in the right parietal  region. There is an area of focal increased density in the  posterolateral right frontal lobe that is unchanged from yesterday and  may represent mineralization related to old infarct. I cannot completely  exclude acute hemorrhage at this location but the appearance remains  unchanged.     There is extensive mucosal thickening in the sphenoid sinus with  associated chronic wall thickening. There is also some mucosal  thickening scattered in the maxillary sinuses, right greater than left  that is unchanged.     CONCLUSION: Extensive diffuse atrophy and old ischemic changes. Very  small amount of acute intraventricular hemorrhage layering in both  occipital horns unchanged from yesterday. Small focal area of  mineralization versus acute intraparenchymal hemorrhage in  posterolateral right  frontal lobe also unchanged. See above discussion.     Radiation dose reduction techniques were utilized, including automated  exposure control and exposure modulation based on body size.       Scheduled Medications  ferrous sulfate, 325 mg, Oral, BID  metoprolol tartrate, 6.25 mg, Oral, BID  potassium chloride, 10 mEq, Oral, Daily  senna, 2 tablet, Oral, Nightly  sodium chloride, 10 mL, Intravenous, Q12H    Infusions  sodium chloride, 50 mL/hr, Last Rate: 50 mL/hr (09/18/20 2136)    Diet  NPO Diet NPO Except: Sips With Meds, Ice Chips       Assessment/Plan     Active Hospital Problems    Diagnosis  POA   • **Intraventricular hemorrhage (CMS/HCC) [I61.5]  Yes   • Fall - 8/282/2020 [W19.XXXA]  Unknown   • Anemia [D64.9]  Unknown   • Atrial fibrillation (CMS/HCC) [I48.91]  Yes   • Essential hypertension [I10]  Yes      Resolved Hospital Problems   No resolved problems to display.       89-year-old gentleman with recent fall with [facility] on 8/28/2020 evaluated at another facility sent in because of low hemoglobin and worsening confusion.  Head CT concerning for evolving traumatic subarachnoid hemorrhage and intraventricular hemorrhage.    Neurosurgery has evaluated patient reviewed head CT.  See their note for details.  Planned for repeat head CT which has been done.  Await their further recommendation.  Holding any blood thinning agents.  Previously prescribed Eliquis.  She is anemic but hemoglobin is stable.  Ferritin is normal and iron studies seem consistent with chronic disease.  B12 slightly low, will add replacement.  Hemoccult pending.  No evidence of infection.  Await SLP, failed dysphagia screening.  Maintain IV fluids for now.      · SCDs for DVT prophylaxis.  · Full code.  · Anticipate discharge to SNU facility in 1-2 days.      Dwight Mccain MD  [facility]  09/19/20  13:48 EDT

## 2020-09-19 NOTE — CONSULTS
"NEUROSURGERY CONSULT      Gaby Diaz  2/10/1931  [id number]    Referring Provider: Little Chambers MD  [address] [place] [address]  Reason for Consultation: Intraventricular hemorrhage    Patient Care Team:  Arsh Ford MD as PCP - General  Arsh Ford MD as PCP - Family Medicine    Chief Complaint: Nursing home reported she was not herself, low hemoglobin    Subjective .     History of Present Illness: Patient is an 89-year-old  female who presents from the nursing home to the emergency room with a report \"low hemoglobin \"and not acting herself.  Patient has no physical complaints or self but clearly has some signs of confusion and dementia.  Patient has no physical complaint this morning for me and denies headache nausea or vomiting.  The ER doctor discovered that the patient had a diagnosis of interventricular hemorrhage 3 weeks ago at [facility] and subsequently transferred to [facility] for neurosurgical evaluation.  She was scheduled to follow-up with [facility] neurosurgery sometime in the next 2 weeks.  She was advised to remain off Eliquis which she takes for A. fib by [facility] neurosurgery for 1 month at which time they would follow her up and it appears Eliquis has not been restarted as directed.    While in the room and during my examination of the patient I wore a mask and eye protection.  I washed my hands before and after this patient encounter.  The patient was also wearing a mask.    Review of Systems  Review of Systems   Unable to perform ROS: Dementia       History  Past Medical History:   Diagnosis Date   • Arthritis of back    • Arthritis of neck    • Atrial fibrillation (CMS/HCC)    • Hip arthrosis    • Hyperlipidemia    • Hypertension    • Knee swelling    • Myocardial ischemia     inferior wall   • Stroke syndrome    ,   Past Surgical History:   Procedure Laterality Date   • HIP SURGERY     • REPLACEMENT TOTAL KNEE Bilateral    ,   Family " History   Problem Relation Age of Onset   • Hypertension Mother    • Heart attack Father    • Heart attack Brother    • Hypertension Brother    • Stroke Brother    • Heart attack Daughter    ,   Social History     Tobacco Use   • Smoking status: Never Smoker   • Smokeless tobacco: Never Used   • Tobacco comment: caff use: 1 cup   Substance Use Topics   • Alcohol use: No   • Drug use: No     E-cigarette/Vaping     E-cigarette/Vaping Substances     E-cigarette/Vaping Devices       ,   Medications Prior to Admission   Medication Sig Dispense Refill Last Dose   • acetaminophen (TYLENOL) 500 MG tablet Take 500 mg by mouth Every 6 (Six) Hours As Needed for Mild Pain  or Fever.      • bisacodyl (DULCOLAX) 10 MG suppository Insert 10 mg into the rectum Daily As Needed for Constipation.      • carboxymethylcellulose (Artificial Tears) 1 % ophthalmic solution Administer 1 drop to both eyes 2 (Two) Times a Day As Needed.      • ferrous sulfate 325 (65 FE) MG tablet Take 325 mg by mouth 2 (two) times a day.      • lactulose (CHRONULAC) 10 GM/15ML solution Take 20 g by mouth Daily As Needed.      • levoFLOXacin (LEVAQUIN) 750 MG tablet Take 750 mg by mouth Daily.      • metoprolol tartrate (LOPRESSOR) 25 MG tablet Take 6.25 mg by mouth 2 (Two) Times a Day.      • oxyCODONE (ROXICODONE) 5 MG immediate release tablet Take 2.5 mg by mouth Every 6 (Six) Hours As Needed for Moderate Pain .      • potassium chloride (K-DUR,KLOR-CON) 20 MEQ CR tablet Take 1 tablet by mouth 2 (Two) Times a Day. 90 tablet 3    • senna (senna) 8.6 MG tablet Take 2 tablets by mouth Every Night.      , Scheduled Meds:  ferrous sulfate, 325 mg, Oral, BID  metoprolol tartrate, 6.25 mg, Oral, BID  potassium chloride, 10 mEq, Oral, Daily  senna, 2 tablet, Oral, Nightly  sodium chloride, 10 mL, Intravenous, Q12H    , Continuous Infusions:  sodium chloride, 50 mL/hr, Last Rate: 50 mL/hr (09/18/20 2136)    , PRN Meds:  •  acetaminophen **OR** acetaminophen **OR**  acetaminophen  •  bisacodyl  •  carboxymethylcellulose sod  •  hydrALAZINE  •  lactulose  •  nitroglycerin  •  ondansetron  •  oxyCODONE  •  sodium chloride and Allergies:  Patient has no known allergies.    Objective     Vital Signs   Temp:  [98.1 °F (36.7 °C)-98.6 °F (37 °C)] 98.3 °F (36.8 °C)  Heart Rate:  [] 94  Resp:  [16-18] 16  BP: (114-163)/() 135/92  Body mass index is 20.14 kg/m².    Physical Exam   Physical Exam     Neurologic Exam    Physical Exam:    CONSTITUTIONAL: This 89 year old  female appears advanced aged, very thin and in no acute distress.    HEAD & FACE: the head and face are symmetric, normocephalic and atraumatic.    EYES: Inspection of the conjunctivae and lids reveals no swelling, erythema or discharge.  Pupils are round, equal and reactive to light and there is no scleral icterus.    FUNDOSCOPIC:  There are no retinal hemorrhages bilaterally.  There is no papilledema bilaterally.     EARS, NOSE, MOUTH & THROAT: On inspection, the ears, nose and oral cavity are within normal limits.    NECK: the neck is supple and symmetric. The trachea is midline with no masses.    PULMONARY: Respiratory effort is normal with no increased work of breathing or signs of respiratory distress.    CARDIOVASCULAR: Pedal pulses are +1/4 bilaterally. Examination of the extremities shows no edema or varicosities.    LYMPHATIC: There is no palpable lymphadenopathy of the neck.    MUSCULOSKELETAL: Gait and station are within normal limits. The spine has normal alignment and range of motion,    SKIN: The skin is warm, dry and intact    NEUROLOGIC:    Cranial Nerves 2 through 12 are grossly intact  Normal motor strength noted without ulnar drift. Muscle bulk and tone are normal.  Sensory exam is normal to fine touch to confrontational testing bilaterally.  Reflexes are absent in the upper and lower extremities  Superficial/Primitive Reflexes: primitive reflexes were absent.  No coordination  deficit observed to finger-to-nose.  Cortical function reveals some memory disorder as she cannot answer many of the detailed questions and is oriented really only to self. Speech is normal and fluent.    PSYCHIATRIC: oriented to person only no abnormal mood or affect    Results Review:   I reviewed the patient's new clinical results.    Images:    I personally reviewed the images from the following radiographic studies.    CT scan of the head reveals bilateral interventricular hemorrhage that sounds very similar and may be less prominent than the studies done at [facility] 3 weeks ago.  No hydrocephalus profound atrophy.    Lab Results (last 24 hours)     Procedure Component Value Units Date/Time    Comprehensive Metabolic Panel [[id number]]  (Abnormal) Collected: 09/18/20 1511    Specimen: Blood Updated: 09/18/20 1544     Glucose 101 mg/dL      BUN 21 mg/dL      Creatinine 1.03 mg/dL      Sodium 140 mmol/L      Potassium 4.0 mmol/L      Chloride 105 mmol/L      CO2 26.6 mmol/L      Calcium 8.0 mg/dL      Total Protein 5.3 g/dL      Albumin 2.90 g/dL      ALT (SGPT) 8 U/L      AST (SGOT) 14 U/L      Alkaline Phosphatase 120 U/L      Total Bilirubin 0.4 mg/dL      eGFR Non African Amer 50 mL/min/1.73      Globulin 2.4 gm/dL      A/G Ratio 1.2 g/dL      BUN/Creatinine Ratio 20.4     Anion Gap 8.4 mmol/L     Narrative:      GFR Normal >60  Chronic Kidney Disease <60  Kidney Failure <15      CBC & Differential [[id number]]  (Abnormal) Collected: 09/18/20 1512    Specimen: Blood Updated: 09/18/20 1527    Narrative:      The following orders were created for panel order CBC & Differential.  Procedure                               Abnormality         Status                     ---------                               -----------         ------                     CBC Auto Differential[[id number]]        Abnormal            Final result                 Please view results for these tests on the  individual orders.    CBC Auto Differential [[id number]]  (Abnormal) Collected: 09/18/20 1512    Specimen: Blood Updated: 09/18/20 1527     WBC 9.48 10*3/mm3      RBC 2.72 10*6/mm3      Hemoglobin 8.1 g/dL      Hematocrit 24.9 %      MCV 91.5 fL      MCH 29.8 pg      MCHC 32.5 g/dL      RDW 14.2 %      RDW-SD 46.7 fl      MPV 10.1 fL      Platelets 187 10*3/mm3      Neutrophil % 69.6 %      Lymphocyte % 15.8 %      Monocyte % 11.3 %      Eosinophil % 0.5 %      Basophil % 0.2 %      Immature Grans % 2.6 %      Neutrophils, Absolute 6.59 10*3/mm3      Lymphocytes, Absolute 1.50 10*3/mm3      Monocytes, Absolute 1.07 10*3/mm3      Eosinophils, Absolute 0.05 10*3/mm3      Basophils, Absolute 0.02 10*3/mm3      Immature Grans, Absolute 0.25 10*3/mm3      nRBC 0.1 /100 WBC     Urinalysis With Microscopic If Indicated (No Culture) - Urine, Clean Catch [[id number]]  (Abnormal) Collected: 09/18/20 1642    Specimen: Urine, Clean Catch Updated: 09/18/20 1708     Color, UA Yellow     Appearance, UA Clear     pH, UA <=5.0     Specific Gravity, UA 1.025     Glucose, UA Negative     Ketones, UA Negative     Bilirubin, UA Negative     Blood, UA Negative     Protein,  mg/dL (2+)     Leuk Esterase, UA Large (3+)     Nitrite, UA Negative     Urobilinogen, UA 1.0 E.U./dL    Urinalysis, Microscopic Only - Urine, Clean Catch [[id number]]  (Abnormal) Collected: 09/18/20 1642    Specimen: Urine, Clean Catch Updated: 09/18/20 1708     RBC, UA 0-2 /HPF      WBC, UA Too Numerous to Count /HPF      Bacteria, UA None Seen /HPF      Squamous Epithelial Cells, UA 0-2 /HPF      Hyaline Casts, UA 0-2 /LPF      Methodology Automated Microscopy    Urine Culture - Urine, Urine, Clean Catch [[id number]] Collected: 09/18/20 1642    Specimen: Urine, Clean Catch Updated: 09/19/20 0933    Protime-INR [[id number]]  (Abnormal) Collected: 09/18/20 1742    Specimen: Blood Updated: 09/18/20 1808     Protime 14.9 Seconds      INR 1.19    aPTT [[id number]]   (Normal) Collected: 09/18/20 1742    Specimen: Blood Updated: 09/18/20 1808     PTT 32.7 seconds     COVID PRE-OP / PRE-PROCEDURE SCREENING ORDER (NO ISOLATION) - Swab, Nasopharynx [[id number]]  (Normal) Collected: 09/18/20 1906    Specimen: Swab from Nasopharynx Updated: 09/18/20 2023    Narrative:      The following orders were created for panel order COVID PRE-OP / PRE-PROCEDURE SCREENING ORDER (NO ISOLATION) - Swab, Nasopharynx.  Procedure                               Abnormality         Status                     ---------                               -----------         ------                     Respiratory Panel PCR w/...[[id number]]  Normal              Final result                 Please view results for these tests on the individual orders.    Respiratory Panel PCR w/COVID-19(SARS-CoV-2) AGAPITO/KARYN/MICHELLE/PAD/COR/MAD In-House, NP Swab in UTM/VTM, 3-4 HR TAT - Swab, Nasopharynx [[id number]]  (Normal) Collected: 09/18/20 1906    Specimen: Swab from Nasopharynx Updated: 09/18/20 2023     ADENOVIRUS, PCR Not Detected     Coronavirus 229E Not Detected     Coronavirus HKU1 Not Detected     Coronavirus NL63 Not Detected     Coronavirus OC43 Not Detected     COVID19 Not Detected     Human Metapneumovirus Not Detected     Human Rhinovirus/Enterovirus Not Detected     Influenza A PCR Not Detected     Influenza A H1 Not Detected     Influenza A H1 2009 PCR Not Detected     Influenza A H3 Not Detected     Influenza B PCR Not Detected     Parainfluenza Virus 1 Not Detected     Parainfluenza Virus 2 Not Detected     Parainfluenza Virus 3 Not Detected     Parainfluenza Virus 4 Not Detected     RSV, PCR Not Detected     Bordetella pertussis pcr Not Detected     Bordetella parapertussis PCR Not Detected     Chlamydophila pneumoniae PCR Not Detected     Mycoplasma pneumo by PCR Not Detected    Narrative:      Fact sheet for providers:  https://docs.my[employer]/wp-content/uploads/KXE8728-6405-JY4.1-EUA-Provider-Fact-Sheet-3.pdf    Fact sheet for patients: https://docs.myS[employer]/wp-content/uploads/ZGX2718-3807-CD5.1-EUA-Patient-Fact-Sheet-1.pdf  Fact sheet for providers: https://docs.[employer]/wp-content/uploads/CZA6959-6658-PQ9.1-EUA-Provider-Fact-Sheet-3.pdf    Fact sheet for patients: https://docs.[employer]/wp-content/uploads/BKT3256-7832-ZE4.1-EUA-Patient-Fact-Sheet-1.pdf    Ferritin [[id number]]  (Normal) Collected: 09/18/20 2142    Specimen: Blood Updated: 09/18/20 2342     Ferritin 87.00 ng/mL     Narrative:      Results may be falsely decreased if patient taking Biotin.      Iron Profile [[id number]]  (Abnormal) Collected: 09/18/20 2142    Specimen: Blood Updated: 09/18/20 2321     Iron 27 mcg/dL      Iron Saturation 11 %      Transferrin 158 mg/dL      TIBC 235 mcg/dL     Reticulocytes [[id number]]  (Abnormal) Collected: 09/18/20 2142    Specimen: Blood Updated: 09/18/20 2204     Reticulocyte % 2.55 %      Reticulocyte Absolute 0.0747 10*6/mm3     Vitamin B12 [[id number]]  (Normal) Collected: 09/18/20 2142    Specimen: Blood Updated: 09/18/20 2342     Vitamin B-12 296 pg/mL     Narrative:      Results may be falsely increased if patient taking Biotin.      Folate [[id number]]  (Normal) Collected: 09/18/20 2142    Specimen: Blood Updated: 09/18/20 2342     Folate 6.22 ng/mL     Narrative:      Results may be falsely increased if patient taking Biotin.      CBC Auto Differential [[id number]]  (Abnormal) Collected: 09/19/20 0447    Specimen: Blood Updated: 09/19/20 0613     WBC 8.53 10*3/mm3      RBC 2.84 10*6/mm3      Hemoglobin 8.3 g/dL      Hematocrit 26.1 %      MCV 91.9 fL      MCH 29.2 pg      MCHC 31.8 g/dL      RDW 13.9 %      RDW-SD 47.0 fl      MPV 10.4 fL      Platelets 194 10*3/mm3      Neutrophil % 64.0 %      Lymphocyte % 21.3 %      Monocyte % 11.4 %      Eosinophil % 1.1 %      Basophil % 0.2 %      Immature Grans % 2.0  %      Neutrophils, Absolute 5.46 10*3/mm3      Lymphocytes, Absolute 1.82 10*3/mm3      Monocytes, Absolute 0.97 10*3/mm3      Eosinophils, Absolute 0.09 10*3/mm3      Basophils, Absolute 0.02 10*3/mm3      Immature Grans, Absolute 0.17 10*3/mm3      nRBC 0.1 /100 WBC     Comprehensive Metabolic Panel [[id number]]  (Abnormal) Collected: 09/19/20 0447    Specimen: Blood Updated: 09/19/20 0637     Glucose 81 mg/dL      BUN 18 mg/dL      Creatinine 0.84 mg/dL      Sodium 143 mmol/L      Potassium 4.1 mmol/L      Chloride 110 mmol/L      CO2 24.4 mmol/L      Calcium 8.2 mg/dL      Total Protein 5.2 g/dL      Albumin 2.80 g/dL      ALT (SGPT) 6 U/L      AST (SGOT) 13 U/L      Alkaline Phosphatase 122 U/L      Total Bilirubin 0.4 mg/dL      eGFR Non African Amer 64 mL/min/1.73      Globulin 2.4 gm/dL      A/G Ratio 1.2 g/dL      BUN/Creatinine Ratio 21.4     Anion Gap 8.6 mmol/L     Narrative:      GFR Normal >60  Chronic Kidney Disease <60  Kidney Failure <15            Assessment/Plan       Intraventricular hemorrhage (CMS/HCC)    Atrial fibrillation (CMS/HCC)    Essential hypertension    Fall - 8/282/2020    Anemia      It appears that the CT scan reveals the continued evolution following her traumatic subarachnoid hemorrhage and intraventricular hemorrhage 3 weeks ago.  The subarachnoid hemorrhage previously evident on the outside imaging is no longer apparent.  The interventricular hemorrhage sounds like it is less prominent and she has not developed hydrocephalus.  We will repeat a CT scan today to make sure there is no change overnight.  Once medically stabilized and if her CT scan is stable she could resume her convalescence at the nursing facility and follow-up with [facility] neurosurgery as scheduled.    Cesar Romero MD  09/19/20  09:43 EDT     Addendum:    CT Head this afternoon stable.  No additional hemorrhage and no hydrocephalus.  OK for discharge when disposition clarified.  Should follow up as  scheduled with [facility] neurosurgery who has outlined the plan for long term follow up and withsonia Taylorilenta for one month following original injury in late August.    Cesar Romero MD  09/19/20  07:55 EDT

## 2020-09-19 NOTE — PLAN OF CARE
Second CT of head completed and no concern for change . Neurosurgery signed off. No falls. Working with PT. Speech cleared patient for pureed diet with thins liquids no straws. Urine out[put mild and still waiting BM. Will CTM   Problem: Adult Inpatient Plan of Care  Goal: Absence of Hospital-Acquired Illness or Injury  Intervention: Identify and Manage Fall Risk  Recent Flowsheet Documentation  Taken 9/19/2020 1405 by Yuliya Romero RN  Safety Promotion/Fall Prevention: activity supervised  Taken 9/19/2020 1204 by Yuliya Romero RN  Safety Promotion/Fall Prevention:   activity supervised   nonskid shoes/slippers when out of bed  Taken 9/19/2020 1004 by Yuliya Romero RN  Safety Promotion/Fall Prevention:   activity supervised   nonskid shoes/slippers when out of bed  Taken 9/19/2020 0804 by Yuliya Romero RN  Safety Promotion/Fall Prevention: activity supervised  Intervention: Prevent Skin Injury  Recent Flowsheet Documentation  Taken 9/19/2020 1405 by Yuliya Romero RN  Body Position:   turned   semi-prone, right  Taken 9/19/2020 1204 by Yuliya Romero RN  Body Position:   turned   side-lying, right  Taken 9/19/2020 1004 by Yuliya Romero RN  Body Position: patient/family refused  Taken 9/19/2020 0804 by Yuliya Romero RN  Body Position:   turned   side-lying, right  Intervention: Prevent and Manage VTE (venous thromboembolism) Risk  Recent Flowsheet Documentation  Taken 9/19/2020 0804 by Yuliya Romero RN  VTE Prevention/Management:   bilateral   dorsiflexion/plantar flexion performed   sequential compression devices off   bleeding risk factor(s) identified   patient refused intervention  Goal: Optimal Comfort and Wellbeing  Intervention: Provide Person-Centered Care  Recent Flowsheet Documentation  Taken 9/19/2020 0804 by Yuliya Romero RN  Trust Relationship/Rapport: care explained     Problem: Skin Injury Risk Increased  Goal: Skin Health and Integrity  Intervention: Optimize Skin Protection  Recent Flowsheet  Documentation  Taken 9/19/2020 1405 by Yuliya Romero RN  Pressure Reduction Techniques:   frequent weight shift encouraged   sit time limited to 2 hours  Skin Protection: skin-to-skin areas padded  Taken 9/19/2020 0804 by Yuliya Romero RN  Pressure Reduction Techniques:   frequent weight shift encouraged   sit time limited to 2 hours  Skin Protection:   adhesive use limited   skin-to-skin areas padded     Problem: Fall Injury Risk  Goal: Absence of Fall and Fall-Related Injury  Intervention: Identify and Manage Contributors to Fall Injury Risk  Recent Flowsheet Documentation  Taken 9/19/2020 1405 by Yuliya Romero RN  Medication Review/Management: medications reviewed  Taken 9/19/2020 1204 by Yuliya Romero RN  Medication Review/Management: medications reviewed  Taken 9/19/2020 1004 by Yuliya Romero RN  Medication Review/Management: medications reviewed  Taken 9/19/2020 0804 by Yuliya Romero RN  Medication Review/Management: medications reviewed  Intervention: Promote Injury-Free Environment  Recent Flowsheet Documentation  Taken 9/19/2020 1405 by Yuliya Romero RN  Safety Promotion/Fall Prevention: activity supervised  Taken 9/19/2020 1204 by Yuliya Romero RN  Safety Promotion/Fall Prevention:   activity supervised   nonskid shoes/slippers when out of bed  Taken 9/19/2020 1004 by Yuliya Romero RN  Safety Promotion/Fall Prevention:   activity supervised   nonskid shoes/slippers when out of bed  Taken 9/19/2020 0804 by Yuliya Romero RN  Safety Promotion/Fall Prevention: activity supervised   Goal Outcome Evaluation:  Plan of Care Reviewed With: patient  Progress: no change  Outcome Summary: Pt admitted this shift. A&O x 4. VSS on RA. NIH 1 for mild aphasia. Passed bedside dysphagia screen. Pt up to bedside commode once with assist x 2, very unsteady. Will CTM.

## 2020-09-19 NOTE — H&P
"Internal medicine history and physical  INTERNAL MEDICINE   [facility]       Patient Identification:  Name: Gaby Diaz  Age: 89 y.o.  Sex: female  :  2/10/1931  MRN: [id number]                   Primary Care Physician: Arsh Ford MD                               Date of admission:2020    Chief Complaint: Sent to the emergency room from nursing home for evaluation of low hemoglobin and intermittent confusion noted by the caring nurse.    History of Present Illness:   Source of information hospital records including ER visit to [facility] and discussion with ER physician.  Patient is a 89-year-old female who lives at [facility] apparently had a fall with scalp laceration and closed head injury on 2020 for which she was taken to [facility].  She was noted to have facial ecchymosis and laceration which was repaired and at that time imaging studies reveal intraventricular hemorrhage as well as subarachnoid hemorrhage.  It appeared that patient was then transferred from the ER to [facility].  Apparently after being at the [facility] based on information gathered after discussion with ER physician patient was sent back to the [facility] and was notified to the nursing staff that she had low hemoglobin of 7.9.  While being there patient nursing staff noticed her to be intermittently confused and this morning did not recognize a family member who was visiting her for brief period of time.  Because of this change in mental status as well as hemoglobin of 7.9 EMS was called and patient was sent to our facility.  Patient herself is unable to give details of her symptoms and she keeps saying \"I do not know\" to most of the questions are \"I do not remember\" to most of the questions.  CT scan of the head performed in our facility revealed small volume bilateral intraventricular hemorrhage right slightly greater than " left with extensive encephalomalacia and background small vessel disease and an area of hypoattenuation within the posterior right frontal lobe concerning for many possibilities including possibility of blood products.  Upon review of encounter on 8/28/2020 when she visited the emergency room at [facility] for head trauma secondary to moderate to severe fall with right-sided facial swelling and bruising the report read as follows:  IMPRESSION:     1. Multiple foci of subarachnoid hemorrhage along the right frontoparietal sulci, with a small amount of layering intraventricular hemorrhage, without significant associated mass effect, midline shift, or hydrocephalus.    2. Findings compatible with a background of generalized parenchymal volume loss with multiple chronic infarcts, as described above.    3. Facial soft tissue injury and right-sided facial bone fractures, better evaluated on the concurrent CT face portion of the exam.  Past Medical History:  Past Medical History:   Diagnosis Date   • Arthritis of back    • Arthritis of neck    • Atrial fibrillation (CMS/HCC)    • Hip arthrosis    • Hyperlipidemia    • Hypertension    • Knee swelling    • Myocardial ischemia     inferior wall   • Stroke syndrome      Past Surgical History:  Past Surgical History:   Procedure Laterality Date   • HIP SURGERY     • REPLACEMENT TOTAL KNEE Bilateral       Home Meds:  (Not in a hospital admission)    Current Meds:   No current facility-administered medications for this encounter.     Current Outpatient Medications:   •  acetaminophen (TYLENOL) 500 MG tablet, Take 500 mg by mouth Every 6 (Six) Hours As Needed for Mild Pain  or Fever., Disp: , Rfl:   •  bisacodyl (DULCOLAX) 10 MG suppository, Insert 10 mg into the rectum Daily As Needed for Constipation., Disp: , Rfl:   •  carboxymethylcellulose (Artificial Tears) 1 % ophthalmic solution, Administer 1 drop to both eyes 2 (Two) Times a Day As Needed., Disp: , Rfl:   •  ferrous  "sulfate 325 (65 FE) MG tablet, Take 325 mg by mouth 2 (two) times a day., Disp: , Rfl:   •  lactulose (CHRONULAC) 10 GM/15ML solution, Take 20 g by mouth Daily As Needed., Disp: , Rfl:   •  levoFLOXacin (LEVAQUIN) 750 MG tablet, Take 750 mg by mouth Daily., Disp: , Rfl:   •  metoprolol tartrate (LOPRESSOR) 25 MG tablet, Take 6.25 mg by mouth 2 (Two) Times a Day., Disp: , Rfl:   •  oxyCODONE (ROXICODONE) 5 MG immediate release tablet, Take 2.5 mg by mouth Every 6 (Six) Hours As Needed for Moderate Pain ., Disp: , Rfl:   •  potassium chloride (K-DUR,KLOR-CON) 20 MEQ CR tablet, Take 1 tablet by mouth 2 (Two) Times a Day., Disp: 90 tablet, Rfl: 3  •  senna (senna) 8.6 MG tablet, Take 2 tablets by mouth Every Night., Disp: , Rfl:   Allergies:  No Known Allergies  Social History:   Social History     Tobacco Use   • Smoking status: Never Smoker   • Smokeless tobacco: Never Used   • Tobacco comment: caff use: 1 cup   Substance Use Topics   • Alcohol use: No      Family History:  Family History   Problem Relation Age of Onset   • Hypertension Mother    • Heart attack Father    • Heart attack Brother    • Hypertension Brother    • Stroke Brother    • Heart attack Daughter           Review of Systems  See history of present illness and past medical history.   Very limited and could not be obtained from the patient.      Vitals:   /91   Pulse 94   Temp 98.6 °F (37 °C) (Tympanic)   Resp 18   Ht 165.1 cm (65\")   Wt 54.9 kg (121 lb)   SpO2 99%   BMI 20.14 kg/m²   I/O: No intake or output data in the 24 hours ending 09/18/20 2011  Exam:  Patient is examined using the personal protective equipment as per guidelines from infection control for this particular patient as enacted.  Hand washing was performed before and after patient interaction.  General Appearance:   Alert answers simple questions with yes and no but claims that she does not remember on no to most of the question.  Does not appear to be in any acute " distress.   Head:    Normocephalic, without obvious abnormality, atraumatic, healed scar of scalp laceration on the right frontoparietal area.  No obvious ecchymosis or deformity of the face noted.   Eyes:    PERRL, conjunctiva/corneas clear, EOM's intact, both eyes   Ears:    Normal external ear canals, both ears   Nose:   Nares normal, septum midline, mucosa normal, no drainage    or sinus tenderness   Throat:   Lips, tongue, gums normal; oral mucosa pink and moist   Neck:   Supple, symmetrical, trachea midline, no adenopathy;     thyroid:  no enlargement/tenderness/nodules; no carotid    bruit or JVD   Back:     Symmetric, no curvature, ROM normal, no CVA tenderness   Lungs:     Clear to auscultation bilaterally, respirations unlabored   Chest Wall:    No tenderness or deformity    Heart:   S1-S2 regular   Abdomen:    Soft nontender   Extremities:   Extremities normal, atraumatic, no cyanosis or edema   Pulses:   Pulses palpable in all extremities; symmetric all extremities   Skin:  Ecchymotic changes noted   Neurologic:  Grossly nonfocal but patient is not oriented and answers to most of the questions by saying I do not know I do not remember.       Data Review:      I reviewed the patient's new clinical results.  Results from last 7 days   Lab Units 09/18/20  1512 09/13/20  1147 09/12/20  1221   WBC 10*3/mm3 9.48 8.61 8.25   HEMOGLOBIN g/dL 8.1* 8.8* 8.5*   PLATELETS 10*3/mm3 187 234 211     Results from last 7 days   Lab Units 09/18/20  1511   SODIUM mmol/L 140   POTASSIUM mmol/L 4.0   CHLORIDE mmol/L 105   CO2 mmol/L 26.6   BUN mg/dL 21   CREATININE mg/dL 1.03*   CALCIUM mg/dL 8.0*   GLUCOSE mg/dL 101*     Ct Head Without Contrast    Result Date: 9/18/2020  1.  Small volume bilateral intraventricular hemorrhage, right slightly greater than left. 2.  Extensive encephalomalacia/gliosis and background small vessel ischemic disease limit evaluation for acute infarction. An area of curvilinear hyperattenuation  within the posterior right frontal lobe, along an area of encephalomalacia/gliosis, may represent mineralization from limited necrosis versus blood products. Comparison with prior imaging would be helpful, if available. Further evaluation with contrast-enhanced MRI is recommended. 3.  Old right facial bone fractures likely secondary to remote [facility] fracture. 4.  Hyperattenuation associated with mucosal thickening in the right maxillary sinus and sphenoid sinus can be seen with fungal colonization or inspissated secretions.  Emergent findings discussed with Dr. Alvarenga at 4:45 PM.  This report was finalized on 9/18/2020 4:52 PM by Dr. Nell Crisostomo M.D.      ECG 12 Lead   Final Result   HEART RATE= 98  bpm   RR Interval= 614  ms   TN Interval=   ms   P Horizontal Axis=   deg   P Front Axis=   deg   QRSD Interval= 90  ms   QT Interval= 365  ms   QRS Axis= 6  deg   T Wave Axis= 40  deg   - ABNORMAL ECG -   Atrial fibrillation   Borderline repol abnrm, anterolateral leads   NO SIGNIFICANT CHANGE FROM PREVIOUS ECG   Electronically Signed By: Arpita March ([facility]) 18-Sep-2020 19:27:43   Date and Time of Study: 2020-09-18 15:53:12          Assessment:  Active Hospital Problems    Diagnosis POA   • **Intraventricular hemorrhage (CMS/HCC) [I61.5] Yes   • Fall - 8/282/2020 [W19.XXXA] Unknown   • Anemia [D64.9] Unknown   • Atrial fibrillation (CMS/HCC) [I48.91] Yes   • Essential hypertension [I10] Yes       Medical decision making:  Intraventricular hemorrhage in the context of significant fall on 8/28/2020 resulting in facial bone fracture, right-sided facial ecchymosis, laceration of scalp and documented subarachnoid and intraventricular hemorrhage at that time with subsequent visit to the [facility] based on the information gathered from review of the emergency room record on 8/28/2020 with no obvious focal neurological deficits or mass-effect at present.  Plan is to admit the patient, neurosurgery consultation and  further imaging and disposition guided by their expertise and recommendation.  Anemia with documented hemoglobin of 8.1 declining from 8.5, 5 days ago etiology unclear.  Patient herself denies any hematemesis melena or bright red blood in the stool.  Plan is to check basic anemia studies and stool for Hemoccult and monitor her H&H.  Intermittent confusion-multifactorial in etiology could be due to underlying dementia further exacerbated by intracranial injury and intraventricular bleed noted about 3  weeks ago and possibility of superimposed urinary tract infection based on the abnormal urinalysis that we have.  Patient does not have leukocytosis and does not have fever or appears ill.  Significance of abnormal urinalysis in this situation is unclear would hold antibiotic therapy at this time and follow-up on urine culture results and monitor for now and manage as as her condition evolves.  Neurosurgery have been consulted for abnormal CT scan of the head and noted intraventricular bleed.  Atrial fibrillation with intermittently rapid ventricular response currently have controlled rate-continue her home medications and hold any antiplatelet and anticoagulation therapy at this time.  Hypertension-continue antihypertensive regimen and avoid hypotensive episodes.  Recurrent fall with significant head injury on 8/28/2020-provided with fall precautions.  Little Chambers MD   9/18/2020  20:11 EDT  Much of this encounter note is an electronic transcription/translation of spoken language to printed text. The electronic translation of spoken language may permit erroneous, or at times, nonsensical words or phrases to be inadvertently transcribed; Although I have reviewed the note for such errors, some may still exist

## 2020-09-19 NOTE — THERAPY EVALUATION
Patient Name: Gaby Diaz  : 2/10/1931    MRN: [id number]                              Today's Date: 2020       Admit Date: 2020    Visit Dx:     ICD-10-CM ICD-9-CM   1. Intraventricular hemorrhage (CMS/HCC)  I61.5 431   2. Anemia, unspecified type  D64.9 285.9     Patient Active Problem List   Diagnosis   • Atrial fibrillation (CMS/HCC)   • Mixed hyperlipidemia   • Essential hypertension   • Intraventricular hemorrhage (CMS/HCC)   • Fall -    • Anemia     Past Medical History:   Diagnosis Date   • Arthritis of back    • Arthritis of neck    • Atrial fibrillation (CMS/HCC)    • Hip arthrosis    • Hyperlipidemia    • Hypertension    • Knee swelling    • Myocardial ischemia     inferior wall   • Stroke syndrome      Past Surgical History:   Procedure Laterality Date   • HIP SURGERY     • REPLACEMENT TOTAL KNEE Bilateral      General Information     Row Name 20 1428          Physical Therapy Time and Intention    Document Type  evaluation  -     Mode of Treatment  individual therapy;physical therapy  -     Row Name 20 1428          General Information    Prior Level of Function  min assist:  -     Existing Precautions/Restrictions  fall  -     Barriers to Rehab  previous functional deficit  -     Row Name 20 1428          Cognition    Orientation Status (Cognition)  oriented x 3  -     Row Name 20 1428          Safety Issues, Functional Mobility    Impairments Affecting Function (Mobility)  balance;endurance/activity tolerance;strength  -       User Key  (r) = Recorded By, (t) = Taken By, (c) = Cosigned By    Initials Name Provider Type     Mer Frausto, PT Physical Therapist        Mobility     Row Name 20 1429          Bed Mobility    Bed Mobility  supine-sit  -     Supine-Sit [place] (Bed Mobility)  moderate assist (50% patient effort)  -     Assistive Device (Bed Mobility)  bed rails;head of bed elevated  -     Row Name  09/19/20 1429          Bed-Chair Transfer    Bed-Chair [place] (Transfers)  minimum assist (75% patient effort);2 person assist  -     Assistive Device (Bed-Chair Transfers)  walker, front-wheeled  -KH     Row Name 09/19/20 1429          Sit-Stand Transfer    Sit-Stand [place] (Transfers)  minimum assist (75% patient effort);2 person assist  -KH     Assistive Device (Sit-Stand Transfers)  walker, front-wheeled  -KH     Row Name 09/19/20 1429          Gait/Stairs (Locomotion)    [place] Level (Gait)  minimum assist (75% patient effort);2 person assist  -KH     Assistive Device (Gait)  walker, lionel  -KH     Distance in Feet (Gait)  5 ft forward/backwards x 2 then pivot to the chair  -     Deviations/Abnormal Patterns (Gait)  gait speed decreased;misael decreased;festinating/shuffling  -     Bilateral Gait Deviations  forward flexed posture;heel strike decreased  -       User Key  (r) = Recorded By, (t) = Taken By, (c) = Cosigned By    Initials Name Provider Type    Mer Mcbride PT Physical Therapist        Obj/Interventions     Row Name 09/19/20 1435          Range of Motion Comprehensive    Comment, General Range of Motion  B knees limited 25%  -     Row Name 09/19/20 1435          Strength Comprehensive (MMT)    Comment, General Manual Muscle Testing (MMT) Assessment  BLE at least 3/5  -     Row Name 09/19/20 1435          Motor Skills    Therapeutic Exercise  -- BLE AP, ALQ, seated marching x 10 reps  -       User Key  (r) = Recorded By, (t) = Taken By, (c) = Cosigned By    Initials Name Provider Type    Mer Mcbride, PT Physical Therapist        Goals/Plan     Row Name 09/19/20 1438          Bed Mobility Goal 1 (PT)    Activity/Assistive Device (Bed Mobility Goal 1, PT)  bed mobility activities, all  -     [place] Level/Cues Needed (Bed Mobility Goal 1, PT)  minimum assist (75% or more patient effort)  -     Time Frame (Bed Mobility Goal 1, PT)  1  week  -     Row Name 09/19/20 1438          Transfer Goal 1 (PT)    Activity/Assistive Device (Transfer Goal 1, PT)  transfers, all;walker, rolling  -KH     [place] Level/Cues Needed (Transfer Goal 1, PT)  contact guard assist  -KH     Time Frame (Transfer Goal 1, PT)  1 week  -[facility] Name 09/19/20 1438          Gait Training Goal 1 (PT)    Activity/Assistive Device (Gait Training Goal 1, PT)  gait (walking locomotion);walker, rolling  -KH     [place] Level (Gait Training Goal 1, PT)  contact guard assist  -KH     Distance (Gait Training Goal 1, PT)  50ft  -KH     Time Frame (Gait Training Goal 1, PT)  1 week  -[facility] Name 09/19/20 1438          Patient Education Goal (PT)    Activity (Patient Education Goal, PT)  HEP  -KH     [place]/Cues/Accuracy (Memory Goal 2, PT)  demonstrates adequately  -KH     Time Frame (Patient Education Goal, PT)  1 week  -       User Key  (r) = Recorded By, (t) = Taken By, (c) = Cosigned By    Initials Name Provider Type    Mer Mcbride, PT Physical Therapist        Clinical Impression     Row Name 09/19/20 1436          Pain    Additional Documentation  Pain Scale: Numbers Pre/Post-Treatment (Group)  -KH     Row Name 09/19/20 1436          Pain Scale: Numbers Pre/Post-Treatment    Pretreatment Pain Rating  0/10 - no pain  -     Posttreatment Pain Rating  0/10 - no pain  -[facility] Name 09/19/20 1436          Plan of Care Review    Plan of Care Reviewed With  patient  -     Outcome Summary  [facility]. Pt presents with limited ROM of both knees, generalized weakness, impaired balance and unsteady gait. SHe required mod A for bed mobility and MIn ax 2 to ambualte a short distance with a Rwx and pivot to the chair. Pt would benefit from PT to address these imapirments and work towards the outlined goals.  -     Row Name 09/19/20 1436          Therapy Assessment/Plan (PT)    Patient/Family  Therapy Goals Statement (PT)  return to facility  -     Rehab Potential (PT)  good, to achieve stated therapy goals  -     Criteria for Skilled Interventions Met (PT)  meets criteria  -     Row Name 09/19/20 1436          Positioning and Restraints    Pre-Treatment Position  in bed  -     Post Treatment Position  chair  -KH     In Chair  reclined;call light within reach;encouraged to call for assist;exit alarm on;notified nsg  -       User Key  (r) = Recorded By, (t) = Taken By, (c) = Cosigned By    Initials Name Provider Type    Mer Mcbride, PT Physical Therapist        Outcome Measures     Row Name 09/19/20 1439          How much help from another person do you currently need...    Turning from your back to your side while in flat bed without using bedrails?  3  -KH     Moving from lying on back to sitting on the side of a flat bed without bedrails?  2  -KH     Moving to and from a bed to a chair (including a wheelchair)?  3  -KH     Standing up from a chair using your arms (e.g., wheelchair, bedside chair)?  3  -KH     Climbing 3-5 steps with a railing?  1  -KH     To walk in hospital room?  3  -KH     AM-PAC 6 Clicks Score (PT)  15  -     Row Name 09/19/20 1439          Functional Assessment    Outcome Measure Options  AM-PAC 6 Clicks Basic Mobility (PT)  -       User Key  (r) = Recorded By, (t) = Taken By, (c) = Cosigned By    Initials Name Provider Type    Mer Mcbride, PT Physical Therapist        Physical Therapy Education                 Title: PT OT SLP Therapies (In Progress)     Topic: Physical Therapy (In Progress)     Point: Mobility training (In Progress)     Learning Progress Summary           Patient Acceptance, D, NR by JAGDEEP at 9/19/2020 1440                   Point: Home exercise program (In Progress)     Learning Progress Summary           Patient Acceptance, D, NR by JAGDEEP at 9/19/2020 1440                   Point: Body mechanics (In Progress)     Learning  Progress Summary           Patient Acceptance, D, NR by  at 9/19/2020 1440                   Point: Precautions (In Progress)     Learning Progress Summary           Patient Acceptance, D, NR by  at 9/19/2020 1440                               User Key     Initials Effective Dates Name Provider Type Discipline     02/05/19 -  Mer Frausto PT Physical Therapist PT              PT Recommendation and Plan  Planned Therapy Interventions (PT): balance training, bed mobility training, gait training, home exercise program, strengthening, transfer training, patient/family education  Plan of Care Reviewed With: patient  Outcome Summary: [facility]. Pt presents with limited ROM of both knees, generalized weakness, impaired balance and unsteady gait. SHe required mod A for bed mobility and MIn ax 2 to ambualte a short distance with a Rwx and pivot to the chair. Pt would benefit from PT to address these imapirments and work towards the outlined goals.     Time Calculation:   PT Charges     Row Name 09/19/20 1441             Time Calculation    Start Time  1017  -      Stop Time  1035  -      Time Calculation (min)  18 min  -      PT Received On  09/19/20  -      PT - Next Appointment  09/21/20  -      PT Goal Re-Cert Due Date  09/26/20  -         Time Calculation- PT    Total Timed Code Minutes- PT  8 minute(s)  -        User Key  (r) = Recorded By, (t) = Taken By, (c) = Cosigned By    Initials Name Provider Type     Mer Frausto PT Physical Therapist        Therapy Charges for Today     Code Description Service Date Service Provider Modifiers Qty    27291432369 HC PT EVAL MOD COMPLEXITY 2 9/19/2020 Mer Frausto, PT GP 1    94631283966 HC PT THER PROC EA 15 MIN 9/19/2020 Mer Frausto, PT GP 1    28426121512 HC PT THER SUPP EA 15 MIN 9/19/2020 Mer Frausto, PT GP 1          PT G-Codes  Outcome Measure  Options: AM-PAC 6 Clicks Basic Mobility (PT)  -[facility] 6 Clicks Score (PT): 15    Mer Frausto, PT  9/19/2020

## 2020-09-20 LAB
ANION GAP SERPL CALCULATED.3IONS-SCNC: 10.1 MMOL/L (ref 5–15)
BACTERIA SPEC AEROBE CULT: NO GROWTH
BUN SERPL-MCNC: 18 MG/DL (ref 8–23)
BUN/CREAT SERPL: 22.5 (ref 7–25)
CALCIUM SPEC-SCNC: 7.9 MG/DL (ref 8.6–10.5)
CHLORIDE SERPL-SCNC: 110 MMOL/L (ref 98–107)
CO2 SERPL-SCNC: 21.9 MMOL/L (ref 22–29)
CREAT SERPL-MCNC: 0.8 MG/DL (ref 0.57–1)
DEPRECATED RDW RBC AUTO: 46.8 FL (ref 37–54)
ERYTHROCYTE [DISTWIDTH] IN BLOOD BY AUTOMATED COUNT: 13.9 % (ref 12.3–15.4)
GFR SERPL CREATININE-BSD FRML MDRD: 68 ML/MIN/1.73
GLUCOSE BLDC GLUCOMTR-MCNC: 114 MG/DL (ref 70–130)
GLUCOSE SERPL-MCNC: 67 MG/DL (ref 65–99)
HCT VFR BLD AUTO: 25.5 % (ref 34–46.6)
HGB BLD-MCNC: 8 G/DL (ref 12–15.9)
MCH RBC QN AUTO: 28.8 PG (ref 26.6–33)
MCHC RBC AUTO-ENTMCNC: 31.4 G/DL (ref 31.5–35.7)
MCV RBC AUTO: 91.7 FL (ref 79–97)
PLATELET # BLD AUTO: 189 10*3/MM3 (ref 140–450)
PMV BLD AUTO: 10.4 FL (ref 6–12)
POTASSIUM SERPL-SCNC: 3.6 MMOL/L (ref 3.5–5.2)
RBC # BLD AUTO: 2.78 10*6/MM3 (ref 3.77–5.28)
SODIUM SERPL-SCNC: 142 MMOL/L (ref 136–145)
WBC # BLD AUTO: 8.96 10*3/MM3 (ref 3.4–10.8)

## 2020-09-20 PROCEDURE — 82962 GLUCOSE BLOOD TEST: CPT

## 2020-09-20 PROCEDURE — 96372 THER/PROPH/DIAG INJ SC/IM: CPT

## 2020-09-20 PROCEDURE — G0378 HOSPITAL OBSERVATION PER HR: HCPCS

## 2020-09-20 PROCEDURE — 25010000002 CYANOCOBALAMIN PER 1000 MCG: Performed by: HOSPITALIST

## 2020-09-20 PROCEDURE — 85027 COMPLETE CBC AUTOMATED: CPT | Performed by: HOSPITALIST

## 2020-09-20 PROCEDURE — 80048 BASIC METABOLIC PNL TOTAL CA: CPT | Performed by: HOSPITALIST

## 2020-09-20 RX ADMIN — POTASSIUM CHLORIDE 10 MEQ: 10 CAPSULE, COATED, EXTENDED RELEASE ORAL at 09:56

## 2020-09-20 RX ADMIN — SENNOSIDES 2 TABLET: 8.6 TABLET, FILM COATED ORAL at 20:49

## 2020-09-20 RX ADMIN — CYANOCOBALAMIN 1000 MCG: 1000 INJECTION, SOLUTION INTRAMUSCULAR at 09:56

## 2020-09-20 RX ADMIN — SODIUM CHLORIDE, PRESERVATIVE FREE 10 ML: 5 INJECTION INTRAVENOUS at 20:50

## 2020-09-20 RX ADMIN — METOPROLOL TARTRATE 6.25 MG: 25 TABLET, FILM COATED ORAL at 09:56

## 2020-09-20 RX ADMIN — FERROUS SULFATE TAB 325 MG (65 MG ELEMENTAL FE) 325 MG: 325 (65 FE) TAB at 20:50

## 2020-09-20 RX ADMIN — FERROUS SULFATE TAB 325 MG (65 MG ELEMENTAL FE) 325 MG: 325 (65 FE) TAB at 09:56

## 2020-09-20 RX ADMIN — METOPROLOL TARTRATE 6.25 MG: 25 TABLET, FILM COATED ORAL at 20:49

## 2020-09-20 NOTE — PLAN OF CARE
Goal Outcome Evaluation:  Plan of Care Reviewed With: patient  Progress: improving  Outcome Summary: VSS, A&Ox4, no complaints. No significant changes in pt this shift. Pt slept most of the night. Still need stool sample, pt has not had BM since this was ordered. Will CTM.

## 2020-09-20 NOTE — PROGRESS NOTES
Name: Gaby Diaz ADMIT: 2020   : 2/10/1931  PCP: Arsh Ford MD    MRN: [id number] LOS: 2 days   AGE/SEX: 89 y.o. female  ROOM: [facility]   Subjective   Chief Complaint   Patient presents with   • Abnormal Lab     Patient [facility] for abnormal labs of a Hgb of 7.2       Denies any bleeding  Had repeat CT yesterday  Feels well    ROS  No f/c  No n/v  No cp/palp  No soa/cough    Objective   Vital Signs  Temp:  [97.4 °F (36.3 °C)-98.2 °F (36.8 °C)] 98.2 °F (36.8 °C)  Heart Rate:  [93-94] 94  Resp:  [16-18] 18  BP: (124-156)/(79-90) 124/79  SpO2:  [94 %-95 %] 95 %  on   ;   Device (Oxygen Therapy): room air  Body mass index is 20.14 kg/m².    Physical Exam  Constitutional:       General: She is not in acute distress.     Appearance: She is well-developed.   HENT:      Head: Normocephalic and atraumatic.   Eyes:      General: No scleral icterus.  Neck:      Musculoskeletal: Neck supple.   Cardiovascular:      Rate and Rhythm: Normal rate.      Heart sounds: Normal heart sounds.   Pulmonary:      Effort: Pulmonary effort is normal. No respiratory distress.   Abdominal:      General: There is no distension.      Palpations: Abdomen is soft.   Neurological:      Mental Status: She is alert.   Psychiatric:         Behavior: Behavior normal.         Results Review:       I reviewed the patient's new clinical results.  Results from last 7 days   Lab Units 20  0520  1512   WBC 10*3/mm3 8.96 8.53 9.48   HEMOGLOBIN g/dL 8.0* 8.3* 8.1*   PLATELETS 10*3/mm3 189 194 187     Results from last 7 days   Lab Units 20  0520 20  1511   SODIUM mmol/L 142 143 140   POTASSIUM mmol/L 3.6 4.1 4.0   CHLORIDE mmol/L 110* 110* 105   CO2 mmol/L 21.9* 24.4 26.6   BUN mg/dL 18 18 21   CREATININE mg/dL 0.80 0.84 1.03*   GLUCOSE mg/dL 67 81 101*   Estimated Creatinine Clearance: 41.3 mL/min (by C-G formula based on SCr of 0.8 mg/dL).  Results from last 7  days   Lab Units 09/19/20  0447 09/18/20  1511   ALBUMIN g/dL 2.80* 2.90*   BILIRUBIN mg/dL 0.4 0.4   ALK PHOS U/L 122* 120*   AST (SGOT) U/L 13 14   ALT (SGPT) U/L 6 8     Results from last 7 days   Lab Units 09/20/20  0520 09/19/20  0447 09/18/20  1511   CALCIUM mg/dL 7.9* 8.2* 8.0*   ALBUMIN g/dL  --  2.80* 2.90*         Coag   Results from last 7 days   Lab Units 09/18/20  1742   INR  1.19*   APTT seconds 32.7     HbA1C No results found for: HGBA1C  Infection   Results from last 7 days   Lab Units 09/18/20  1642   URINECX  No growth     Radiology(recent) Ct Head Without Contrast    Result Date: 9/18/2020  1.  Small volume bilateral intraventricular hemorrhage, right slightly greater than left. 2.  Extensive encephalomalacia/gliosis and background small vessel ischemic disease limit evaluation for acute infarction. An area of curvilinear hyperattenuation within the posterior right frontal lobe, along an area of encephalomalacia/gliosis, may represent mineralization from limited necrosis versus blood products. Comparison with prior imaging would be helpful, if available. Further evaluation with contrast-enhanced MRI is recommended. 3.  Old right facial bone fractures likely secondary to remote [facility] fracture. 4.  Hyperattenuation associated with mucosal thickening in the right maxillary sinus and sphenoid sinus can be seen with fungal colonization or inspissated secretions.  Emergent findings discussed with Dr. Alvarenga at 4:45 PM.  This report was finalized on 9/18/2020 4:52 PM by Dr. Nell Crisostomo M.D.      No results found for: TROPONINT, TROPONINI, BNP  No components found for: TSH;2    cyanocobalamin, 1,000 mcg, Intramuscular, Daily  ferrous sulfate, 325 mg, Oral, BID  metoprolol tartrate, 6.25 mg, Oral, BID  potassium chloride, 10 mEq, Oral, Daily  senna, 2 tablet, Oral, Nightly  sodium chloride, 10 mL, Intravenous, Q12H      sodium chloride, 50 mL/hr, Last Rate: 50 mL/hr (09/19/20 1821)    Diet Pureed;  Thin      Assessment/Plan      Active Hospital Problems    Diagnosis  POA   • **Intraventricular hemorrhage (CMS/HCC) [I61.5]  Yes   • Fall - 8/282/2020 [W19.XXXA]  Unknown   • Anemia [D64.9]  Unknown   • Atrial fibrillation (CMS/HCC) [I48.91]  Yes   • Essential hypertension [I10]  Yes      Resolved Hospital Problems   No resolved problems to display.          89-year-old gentleman with recent fall with [facility] on 8/28/2020 evaluated at another facility sent in because of low hemoglobin and worsening confusion.  Head CT concerning for evolving traumatic subarachnoid hemorrhage and intraventricular hemorrhage.     Neurosurgery has evaluated patient reviewed head CT and recommend follow up with [facility].  Holding any blood thinning agents.   She is anemic but hemoglobin is stable.  Ferritin is normal and iron studies seem consistent with chronic disease.  B12 slightly low, added replacement.  Hemoccult pending.  No evidence of infection.        · SCDs for DVT prophylaxis.    · Anticipate discharge to SNU facility tomorrow         LASHON RN  LASHON family      Royal Centeno MD  [facility]  09/20/20  14:28 EDT

## 2020-09-20 NOTE — DISCHARGE PLACEMENT REQUEST
"Gaby Adames (89 y.o. Female)     Date of Birth Social Security Number Address Home Phone MRN    02/10/1931  [address] [address] [phone] [id number]    [employer] Marital Status          [place]        Admission Date Admission Type Admitting Provider Attending Provider Department, Room/Bed    9/18/20 Emergency [facility] MD Jackson, Alan David, MD [address], [address]    Discharge Date Discharge Disposition Discharge Destination                       Attending Provider: Royal Centeno MD    Allergies: No Known Allergies    Isolation: None   Infection: None   Code Status: CPR    Ht: 165.1 cm (65\")   Wt: 54.9 kg (121 lb)    Admission Cmt: None   Principal Problem: Intraventricular hemorrhage (CMS/HCC) [I61.5]                 Active Insurance as of 9/18/2020     Primary Coverage     Payor Plan Insurance Group Employer/Plan Group    HUMANA MEDICARE REPLACEMENT HUMANA MEDICARE REPLACEMENT [id number]     Payor Plan Address Payor Plan Phone Number Payor Plan Fax Number Effective Dates    [address] [address] [phone]  1/1/2018 - None Entered    formerly [facility] [address]       Subscriber Name Subscriber Birth Date Member ID       GABY ADAMES 2/10/1931 [id number]                 Emergency Contacts      (Rel.) Home Phone Work Phone Mobile Phone    ZacheryRomelia (Daughter) -- -- [phone]    EmilyMargon (Son) -- -- [phone]    Liana Robles (Daughter) -- -- [phone]              "

## 2020-09-21 ENCOUNTER — APPOINTMENT (OUTPATIENT)
Dept: CT IMAGING | Facility: HOSPITAL | Age: 85
End: 2020-09-21

## 2020-09-21 VITALS
BODY MASS INDEX: 20.84 KG/M2 | SYSTOLIC BLOOD PRESSURE: 133 MMHG | RESPIRATION RATE: 16 BRPM | WEIGHT: 125.1 LBS | HEART RATE: 103 BPM | TEMPERATURE: 98.1 F | DIASTOLIC BLOOD PRESSURE: 87 MMHG | OXYGEN SATURATION: 92 % | HEIGHT: 65 IN

## 2020-09-21 PROCEDURE — 96372 THER/PROPH/DIAG INJ SC/IM: CPT

## 2020-09-21 PROCEDURE — G0378 HOSPITAL OBSERVATION PER HR: HCPCS

## 2020-09-21 PROCEDURE — 92610 EVALUATE SWALLOWING FUNCTION: CPT

## 2020-09-21 PROCEDURE — 97110 THERAPEUTIC EXERCISES: CPT

## 2020-09-21 PROCEDURE — 25010000002 CYANOCOBALAMIN PER 1000 MCG: Performed by: HOSPITALIST

## 2020-09-21 RX ORDER — CHOLECALCIFEROL (VITAMIN D3) 125 MCG
500 CAPSULE ORAL DAILY
Start: 2020-09-21

## 2020-09-21 RX ADMIN — POTASSIUM CHLORIDE 10 MEQ: 10 CAPSULE, COATED, EXTENDED RELEASE ORAL at 08:33

## 2020-09-21 RX ADMIN — FERROUS SULFATE TAB 325 MG (65 MG ELEMENTAL FE) 325 MG: 325 (65 FE) TAB at 08:33

## 2020-09-21 RX ADMIN — SODIUM CHLORIDE, PRESERVATIVE FREE 10 ML: 5 INJECTION INTRAVENOUS at 08:34

## 2020-09-21 RX ADMIN — CYANOCOBALAMIN 1000 MCG: 1000 INJECTION, SOLUTION INTRAMUSCULAR at 08:33

## 2020-09-21 RX ADMIN — METOPROLOL TARTRATE 6.25 MG: 25 TABLET, FILM COATED ORAL at 08:33

## 2020-09-21 NOTE — THERAPY EVALUATION
Patient Name: Gaby Diaz  : 2/10/1931    MRN: [id number]                              Today's Date: 2020       Admit Date: 2020    Visit Dx:     ICD-10-CM ICD-9-CM   1. Intraventricular hemorrhage (CMS/HCC)  I61.5 431   2. Anemia, unspecified type  D64.9 285.9     Patient Active Problem List   Diagnosis   • Atrial fibrillation (CMS/HCC)   • Mixed hyperlipidemia   • Essential hypertension   • Intraventricular hemorrhage (CMS/HCC)   • Fall -    • Anemia     Past Medical History:   Diagnosis Date   • Arthritis of back    • Arthritis of neck    • Atrial fibrillation (CMS/HCC)    • Hip arthrosis    • Hyperlipidemia    • Hypertension    • Knee swelling    • Myocardial ischemia     inferior wall   • Stroke syndrome      Past Surgical History:   Procedure Laterality Date   • HIP SURGERY     • REPLACEMENT TOTAL KNEE Bilateral      General Information     Row Name 20 1202          Physical Therapy Time and Intention    Document Type  therapy note (daily note)  -MS     Mode of Treatment  physical therapy  -MS     Row Name 20 1202          General Information    Existing Precautions/Restrictions  fall  -MS     Row Name 20 1202          Cognition    Orientation Status (Cognition)  oriented to;person  -MS     Row Name 20 1202          Safety Issues, Functional Mobility    Safety Issues Affecting Function (Mobility)  insight into deficits/self-awareness;positioning of assistive device;sequencing abilities;judgment  -MS       User Key  (r) = Recorded By, (t) = Taken By, (c) = Cosigned By    Initials Name Provider Type    Ximena Friend PT Physical Therapist        Mobility     Row Name 20 1202          Bed Mobility    Bed Mobility  supine-sit  -MS     Supine-Sit [place] (Bed Mobility)  minimum assist (75% patient effort);verbal cues;nonverbal cues (demo/gesture)  -MS     Assistive Device (Bed Mobility)  bed rails;head of bed elevated  -MS     Comment (Bed  Mobility)  SBA-CGA for sitting balance.  -MS     Row Name 09/21/20 1202          Transfers    Comment (Transfers)  Sequencing and hand placement cues. Strong posterior lean but corrected well with cues.  -MS     Row Name 09/21/20 1202          Sit-Stand Transfer    Sit-Stand [place] (Transfers)  minimum assist (75% patient effort);moderate assist (50% patient effort);verbal cues;nonverbal cues (demo/gesture) x2  -MS     Assistive Device (Sit-Stand Transfers)  walker, front-wheeled  -MS     Row Name 09/21/20 1202          Gait/Stairs (Locomotion)    [place] Level (Gait)  minimum assist (75% patient effort);verbal cues;nonverbal cues (demo/gesture)  -MS     Assistive Device (Gait)  walker, front-wheeled  -MS     Distance in Feet (Gait)  5'  -MS     Deviations/Abnormal Patterns (Gait)  misael decreased;gait speed decreased  -MS     Comment (Gait/Stairs)  heavy sequencing- small steps to chair, fatigued quickly, varied O2 levels (notified RN)  -MS       User Key  (r) = Recorded By, (t) = Taken By, (c) = Cosigned By    Initials Name Provider Type    Ximena Friend, PT Physical Therapist        Obj/Interventions     [facility] Name 09/21/20 1205          Motor Skills    Therapeutic Exercise  -- Heel slides, APs and LAQs x 10 reps  -MS       User Key  (r) = Recorded By, (t) = Taken By, (c) = Cosigned By    Initials Name Provider Type    Ximena Friend, PT Physical Therapist        Goals/Plan    No documentation.       Clinical Impression     Row Name 09/21/20 1205          Pain Scale: Numbers Pre/Post-Treatment    Pretreatment Pain Rating  0/10 - no pain  -MS     Row Name 09/21/20 1205          Plan of Care Review    Plan of Care Reviewed With  patient  -MS     Outcome Summary  Patient continues to progress gradually towards goals with PT. Min-modA for standing and ambulated 5' [place] with a RW to the chair. Initially, patient demos a strong posterior lean but corrects well when cued. Will continue to  advance as able.  -MS       User Key  (r) = Recorded By, (t) = Taken By, (c) = Cosigned By    Initials Name Provider Type    MS Guadarrama Ximena LILI, PT Physical Therapist        Outcome Measures    No documentation.       Physical Therapy Education                 Title: PT OT SLP Therapies (In Progress)     Topic: Physical Therapy (In Progress)     Point: Mobility training (In Progress)     Learning Progress Summary           Patient Acceptance, D, NR by  at 9/19/2020 1440                   Point: Home exercise program (In Progress)     Learning Progress Summary           Patient Acceptance, D, NR by  at 9/19/2020 1440                   Point: Body mechanics (In Progress)     Learning Progress Summary           Patient Acceptance, D, NR by  at 9/19/2020 1440                   Point: Precautions (In Progress)     Learning Progress Summary           Patient Acceptance, D, NR by  at 9/19/2020 1440                               User Key     Initials Effective Dates Name Provider Type Counts include [facility] beds at the [facility] 02/05/19 -  Mer Frausto, PT Physical Therapist PT              PT Recommendation and Plan     Plan of Care Reviewed With: patient  Outcome Summary: Patient continues to progress gradually towards goals with PT. Min-modA for standing and ambulated 5' [place] with a RW to the chair. Initially, patient demos a strong posterior lean but corrects well when cued. Will continue to advance as able.     Time Calculation:   PT Charges     Row Name 09/21/20 1202             Time Calculation    Start Time  0944  -MS      Stop Time  1002  -MS      Time Calculation (min)  18 min  -MS      PT Received On  09/21/20  -MS      PT - Next Appointment  09/22/20  -MS        User Key  (r) = Recorded By, (t) = Taken By, (c) = Cosigned By    Initials Name Provider Type    MS GuadarramaXimena, PT Physical Therapist        Therapy Charges for Today     Code Description Service Date Service Provider Modifiers Qty    02530667839   PT THER PROC EA 15 MIN 9/21/2020 Ximena Guadarrama, PT GP 1    54296168378  PT THER SUPP EA 15 MIN 9/21/2020 Ximena Guadarrama, PT GP 1          PT G-Codes  Outcome Measure Options: AM-PAC 6 Clicks Basic Mobility (PT)  AM-PAC 6 Clicks Score (PT): 15    Ximena Guadarrama, PT  9/21/2020

## 2020-09-21 NOTE — PLAN OF CARE
Problem: Adult Inpatient Plan of Care  Goal: Plan of Care Review  Recent Flowsheet Documentation  Taken 9/21/2020 1205 by Ximena Guadarrama, PT  Plan of Care Reviewed With: patient  Outcome Summary: Patient continues to progress gradually towards goals with PT. Min-modA for standing and ambulated 5' [place] with a RW to the chair. Initially, patient demos a strong posterior lean but corrects well when cued. Will continue to advance as able.    Patient was not wearing a face mask during this therapy encounter. Therapist used appropriate personal protective equipment including eye protection, mask, and gloves.  Mask used was standard procedure mask. Appropriate PPE was worn during the entire therapy session. Hand hygiene was completed before and after therapy session. Patient is not in enhanced droplet precautions.

## 2020-09-21 NOTE — PROGRESS NOTES
Continued Stay Note  [facility]     Patient Name: Gaby Diaz  MRN: [id number]  Today's Date: 9/21/2020    Admit Date: 9/18/2020    Discharge Plan     Row Name 09/21/20 1142       Plan    Plan  Return to [facility] - daughter to provide transport    Patient/Family in Agreement with Plan  yes    Plan Comments  CCP spoke with [facility] [place] and they can accept pt back today. Packet in pinky. jony lam/ccp        Discharge Codes    No documentation.             Mer Blair RN

## 2020-09-21 NOTE — DISCHARGE SUMMARY
NAME: Gaby iDaz ADMIT: 2020   : 2/10/1931  PCP: Arsh Ford MD    MRN: [id number] LOS: 0 days   AGE/SEX: 89 y.o. female  ROOM: [facility]     Date of Admission:  2020  Date of Discharge:  2020    PCP: Arsh Ford MD    CHIEF COMPLAINT  Abnormal Lab (Patient sent from the [facility] for abnormal labs of a Hgb of 7.2)      DISCHARGE DIAGNOSIS  Active Hospital Problems    Diagnosis  POA   • **Intraventricular hemorrhage (CMS/HCC) [I61.5]  Yes   • Fall -  [W19.XXXA]  Yes   • Anemia [D64.9]  Yes   • Atrial fibrillation (CMS/HCC) [I48.91]  Yes   • Essential hypertension [I10]  Yes      Resolved Hospital Problems   No resolved problems to display.       SECONDARY DIAGNOSES  Past Medical History:   Diagnosis Date   • Arthritis of back    • Arthritis of neck    • Atrial fibrillation (CMS/HCC)    • Hip arthrosis    • Hyperlipidemia    • Hypertension    • Knee swelling    • Myocardial ischemia     inferior wall   • Stroke syndrome        CONSULTS   [facility]    HOSPITAL COURSE  89-year-old with recent fall with [facility] on 2020 evaluated at another facility sent in because of low hemoglobin and worsening confusion.  Head CT concerning for evolving traumatic subarachnoid hemorrhage and intraventricular hemorrhage.     Neurosurgery has evaluated patient reviewed head CT and recommend follow up with [facility].  Holding any blood thinning agents.   She is anemic but hemoglobin is stable.  Ferritin is normal and iron studies seem consistent with chronic disease.  B12 slightly low, added replacement.  No evidence of infection or bleeding. Can follow up with Arsh Ford MD for the anemia.        DIAGNOSTICS  CT Head Without Contrast [[id number]] Abdirashid as Reviewed   Order Status: Completed Collected: 20 1328    Updated: 20 1445   Narrative:     CT SCAN OF THE BRAIN WITHOUT CONTRAST       HISTORY: Transient confusion. Follow-up of intraventricular hemorrhage.   The CT scan  was performed through the brain without contrast and   compared to yesterday's exam and again demonstrates extensive diffuse   atrophy and chronic small vessel ischemic change. Again noted is a tiny   amount of acute blood layering in both occipital horns without change.   There appears to be an area of old infarction in the right parietal   region. There is an area of focal increased density in the   posterolateral right frontal lobe that is unchanged from yesterday and   may represent mineralization related to old infarct. I cannot completely   exclude acute hemorrhage at this location but the appearance remains   unchanged.       There is extensive mucosal thickening in the sphenoid sinus with   associated chronic wall thickening. There is also some mucosal   thickening scattered in the maxillary sinuses, right greater than left   that is unchanged.       CONCLUSION: Extensive diffuse atrophy and old ischemic changes. Very   small amount of acute intraventricular hemorrhage layering in both   occipital horns unchanged from yesterday. Small focal area of   mineralization versus acute intraparenchymal hemorrhage in   posterolateral right frontal lobe also unchanged. See above discussion.        09/20/2020 0520 09/20/2020 0653 Basic Metabolic Panel [[id number]]    (Abnormal)   Blood    Final result Component Value Units   Glucose 67 mg/dL   BUN 18 mg/dL   Creatinine 0.80 mg/dL   Sodium 142 mmol/L   Potassium 3.6 mmol/L   Chloride 110High  mmol/L   CO2 21.9Low  mmol/L   Calcium 7.9Low  mg/dL   eGFR Non African Am 68 mL/min/1.73   BUN/Creatinine Ratio 22.5    Anion Gap 10.1 mmol/L           09/20/2020 0520 09/20/2020 0627 CBC (No Diff) [[id number]]   (Abnormal)   Blood    Final result Component Value Units   WBC 8.96 10*3/mm3   RBC 2.78Low  10*6/mm3   Hemoglobin 8.0Low  g/dL   Hematocrit 25.5Low  %   MCV 91.7 fL   MCH 28.8 pg   MCHC 31.4Low  g/dL   RDW 13.9 %   RDW-SD 46.8 fl   MPV 10.4 fL   Platelets 189 10*3/mm3            09/18/2020 2142 09/18/2020 2342 Ferritin [[id number]]    Blood    Final result Component Value Units   Ferritin 87.00 ng/mL           09/18/2020 2142 09/18/2020 2321 Iron Profile [[id number]]   (Abnormal)   Blood    Final result Component Value Units   Iron 27Low  mcg/dL   Iron Saturation 11Low  %   Transferrin 158Low  mg/dL   TIBC 235Low  mcg/dL           09/18/2020 2142 09/18/2020 2204 Reticulocytes [[id number]]   (Abnormal)   Blood    Final result Component Value Units   Reticulocyte % 2.55High  %   Reticulocyte Absolute 0.0747 10*6/mm3           09/18/2020 2142 09/18/2020 2342 Vitamin B12 [[id number]]    Blood    Final result Component Value Units   Vitamin B-12 296 pg/mL           09/18/2020 2142 09/18/2020 2342 Folate [[id number]]    Blood    Final result Component Value Units   Folate 6.22 ng/mL              PHYSICAL EXAM  Objective    Alert  nad  No resp distress  elderly    CONDITION ON DISCHARGE  Stable.      DISCHARGE DISPOSITION   Skilled Nursing Facility (DC - External)      DISCHARGE MEDICATIONS       Your medication list      START taking these medications      Instructions Last Dose Given Next Dose Due   vitamin B-12 500 MCG tablet  Commonly known as: CYANOCOBALAMIN      Take 1 tablet by mouth Daily.          CONTINUE taking these medications      Instructions Last Dose Given Next Dose Due   acetaminophen 500 MG tablet  Commonly known as: TYLENOL      Take 500 mg by mouth Every 6 (Six) Hours As Needed for Mild Pain  or Fever.       Artificial Tears 1 % ophthalmic solution  Generic drug: carboxymethylcellulose      Administer 1 drop to both eyes 2 (Two) Times a Day As Needed.       bisacodyl 10 MG suppository  Commonly known as: DULCOLAX      Insert 10 mg into the rectum Daily As Needed for Constipation.       ferrous sulfate 325 (65 FE) MG tablet      Take 325 mg by mouth 2 (two) times a day.       lactulose 10 GM/15ML solution  Commonly known as: CHRONULAC      Take 20 g by mouth Daily As Needed.        metoprolol tartrate 25 MG tablet  Commonly known as: LOPRESSOR      Take 6.25 mg by mouth 2 (Two) Times a Day.       potassium chloride 20 MEQ CR tablet  Commonly known as: K-DUR,KLOR-CON      Take 1 tablet by mouth 2 (Two) Times a Day.       senna 8.6 MG tablet  Commonly known as: SENOKOT      Take 2 tablets by mouth Every Night.          STOP taking these medications    levoFLOXacin 750 MG tablet  Commonly known as: LEVAQUIN        oxyCODONE 5 MG immediate release tablet  Commonly known as: ROXICODONE              Where to Get Your Medications      Information about where to get these medications is not yet available    Ask your nurse or doctor about these medications  · vitamin B-12 500 MCG tablet          Future Appointments   Date Time Provider Department Center   10/27/2020 11:00 AM Gabriella Joshi APRN [facility] None     Additional Instructions for the Follow-ups that You Need to Schedule     Discharge Follow-up with PCP   As directed       Currently Documented PCP:    Arsh Ford MD    PCP Phone Number:    [phone]     Follow Up Details: 1-2 weeks         Discharge Follow-up with Specialty: [facility] as previously planned in ~1 week   As directed      Specialty: [facility] as previously planned in ~1 week            Contact information for follow-up providers     Arsh Ford MD .    Specialty: Family Medicine  Why: 1-2 weeks  Contact information:  [address] [facility] [address] [phone]                   Contact information for after-discharge care     Destination     [facility] .    Service: Intermediate Care  Contact information:  [facility] [address] [phone]                             TEST  RESULTS PENDING AT DISCHARGE         Royal Centeno MD  [facility]  09/21/20  13:00 EDT      Time: greater than 32 minutes on discharge  It was a pleasure taking care of this patient while in  [facility].

## 2020-09-21 NOTE — THERAPY EVALUATION
Acute Care - Speech Language Pathology   Swallow Re-Evaluation [facility]     Patient Name: Gaby Diaz  : 2/10/1931  MRN: [id number]  Today's Date: 2020               Admit Date: 2020    Visit Dx:     ICD-10-CM ICD-9-CM   1. Intraventricular hemorrhage (CMS/HCC)  I61.5 431   2. Anemia, unspecified type  D64.9 285.9     Patient Active Problem List   Diagnosis   • Atrial fibrillation (CMS/HCC)   • Mixed hyperlipidemia   • Essential hypertension   • Intraventricular hemorrhage (CMS/HCC)   • Fall -    • Anemia     Past Medical History:   Diagnosis Date   • Arthritis of back    • Arthritis of neck    • Atrial fibrillation (CMS/HCC)    • Hip arthrosis    • Hyperlipidemia    • Hypertension    • Knee swelling    • Myocardial ischemia     inferior wall   • Stroke syndrome      Past Surgical History:   Procedure Laterality Date   • HIP SURGERY     • REPLACEMENT TOTAL KNEE Bilateral         SWALLOW EVALUATION (last 72 hours)      SLP Adult Swallow Evaluation     Row Name 20 1034 20 0141                Rehab Evaluation    Document Type  re-evaluation  -LN  evaluation  -ML       Subjective Information  no complaints  -LN  no complaints  -ML       Patient Observations  alert;cooperative  -LN  alert;cooperative  -ML       Patient/Family/Caregiver Comments/Observations  Granddaughter present  -LN  --       Care Plan Review  --  evaluation/treatment results reviewed  -ML       Patient Effort  good  -LN  good  -ML       Symptoms Noted During/After Treatment  none  -LN  none  -ML          General Information    Patient Profile Reviewed  yes  -LN  yes  -ML       Pertinent History Of Current Problem  Bedside swallow eval  rec puree/thins via cup due to throat clear with thins and coughing with mech soft.   -LN  Per chart, pt with medical hx of recent fall resulting in intraventricular hemorrhage which was evaluated at another facility admitted this date because of low hemoglobin and  worsening confusion.  Head CT concerning for evolving traumatic subarachnoid hemorrhage and intraventricular hemorrhage per chart.   -ML       Current Method of Nutrition  pureed;thin liquids  -LN  NPO failed screen  -ML       Precautions/Limitations, Vision  WFL;for purposes of eval  -LN  WFL;for purposes of eval  -ML       Precautions/Limitations, Hearing  WFL;for purposes of eval  -LN  WFL;for purposes of eval  -ML       Prior Level of Function-Communication  other (see comments) unknown  -LN  -- unknown  -ML       Prior Level of Function-Swallowing  no diet consistency restrictions  -LN  no diet consistency restrictions per pt, ?reliable report  -ML       Plans/Goals Discussed with  patient;agreed upon  -LN  patient;agreed upon  -ML       Patient's Goals for Discharge  --  patient did not state  -ML          Pain    Additional Documentation  Pain Scale: Numbers Pre/Post-Treatment (Group)  -LN  Pain Scale: Numbers Pre/Post-Treatment (Group)  -ML          Pain Scale: Numbers Pre/Post-Treatment    Pretreatment Pain Rating  0/10 - no pain  -LN  0/10 - no pain  -ML       Posttreatment Pain Rating  0/10 - no pain  -LN  0/10 - no pain  -ML          Oral Motor and Function    Dentition Assessment  upper dentures/partial in place;lower dentures/partial in place;other (see comments) Upper and lower partials  -LN  natural, present and adequate;missing teeth  -ML       Secretion Management  WNL/WFL  -LN  WNL/WFL  -ML       Mucosal Quality  moist, healthy  -LN  moist, healthy  -ML          Oral Musculature and Cranial Nerve Assessment    Oral Motor General Assessment  generalized oral motor weakness  -LN  generalized oral motor weakness  -ML       Oral Motor, Comment  Mild left facial droop still noted  -LN  Pt exhibiting difficulty lateralizing tongue into cheeks and exhibited decreased ROM of labial retraction and protrusion. Pt appeared to exhibit mild left facial droop.   -ML          General Eating/Swallowing  Observations    Respiratory Support Currently in Use  room air  -LN  room air  -ML       Eating/Swallowing Skills  self-fed;fed by SLP  -LN  self-fed;fed by SLP  -ML       Positioning During Eating  upright in bed  -LN  upright in bed;upright 90 degree  -ML       Utensils Used  spoon;straw  -LN  spoon;cup;straw  -ML       Consistencies Trialed  mechanical soft, no mixed consistencies;pureed;thin liquids  -LN  mechanical soft, no mixed consistencies;pureed;thin liquids  -ML          Clinical Swallow Eval    Clinical Swallow Evaluation Summary  Pt seen this date for re-eval of her swallow, following initial bedside eval on 9/19 which rec puree/thin liquids. Pt assisted in trialing thins via cup, puree, and mechanical soft consistencies. The patient stated she has a poor appetite and granddaughter agreed. She was agreeable to limited trials of each liquid and consistency. No clinical s/s of aspiration noted with any trials, though mastication/bolus formation was delayed and weak for mechanical soft consistencies. Rec puree, with some mashed and thin liquids by cup only. No straws, small bites/sips, and upright for po. Meds whole with puree if small, though needs crushed if large.   -LN  Clinical swallowing evaluation completed. Pt presents with suspected mild-moderate oropharyngeal dysphagia characterized by slowed and impaired oral transit (causing suspected premature spillage) and delayed swallow initiation and impaired airway closure indicated by overt coughing during trial of mechanical soft solid and throat clearing after trials of thin by straw. During trial of mechanical soft solid, suspect spillage of portion of trial while pt still completing mastication/bolus formation resulting in overt coughing/choking. It took time for pt to stop coughing and recover after trial. Pt exhibited no overt s/s of penetration/aspiration during any trials of puree solids or thin by cup. Recommend puree solid diet and thin liquids  by cup only. No straws and fully upright posture with small bites/drinks. Meds whole in puree if small, crushed if large.   -ML          Clinical Impression    SLP Swallowing Diagnosis  mild-moderate;oral dysfunction;suspected pharyngeal dysfunction  -LN  mild-moderate;oral dysfunction;suspected pharyngeal dysfunction  -ML       Functional Impact  risk of aspiration/pneumonia  -LN  risk of aspiration/pneumonia  -ML       Rehab Potential/Prognosis, Swallowing  good, to achieve stated therapy goals  -LN  good, to achieve stated therapy goals  -ML       Swallow Criteria for Skilled Therapeutic Interventions Met  demonstrates skilled criteria  -LN  demonstrates skilled criteria  -ML          Recommendations    Therapy Frequency (Swallow)  PRN  -LN  PRN  -ML       Predicted Duration Therapy Intervention (Days)  until discharge  -LN  until discharge  -ML       SLP Diet Recommendation  puree with some mashed;thin liquids  -LN  puree;thin liquids  -ML       Recommended Diagnostics  reassess via clinical swallow evaluation;other (see comments) Reassess in a few days  -LN  reassess via clinical swallow evaluation  -ML       Recommended Precautions and Strategies  upright posture during/after eating;small bites of food and sips of liquid;no straw  -LN  upright posture during/after eating;small bites of food and sips of liquid;no straw  -ML       SLP Rec. for Method of Medication Administration  meds whole;with pudding or applesauce;as tolerated CRUSH LARGE PILLS  -LN  meds whole;meds crushed;with pudding or applesauce  -ML       Monitor for Signs of Aspiration  yes;notify SLP if any concerns  -LN  yes;notify SLP if any concerns  -ML       Anticipated Dischage Disposition (SLP)  anticipate therapy at next level of care  -LN  anticipate therapy at next level of care  -ML          Swallow Goals (SLP)    Oral Nutrition/Hydration Goal Selection (SLP)  oral nutrition/hydration, SLP goal 1  -LN  oral nutrition/hydration, SLP goal 1   -ML          Oral Nutrition/Hydration Goal 1 (SLP)    Oral Nutrition/Hydration Goal 1, SLP  Pt will safely swallow least restrictive diet without overt s/s of penetration/aspiration.   -LN  Pt will safely swallow least restrictive diet without overt s/s of penetration/aspiration.   -ML       Time Frame (Oral Nutrition/Hydration Goal 1, SLP)  by discharge  -LN  by discharge  -ML       Progress/Outcomes (Oral Nutrition/Hydration Goal 1, SLP)  continuing progress toward goal  -LN  --         User Key  (r) = Recorded By, (t) = Taken By, (c) = Cosigned By    Initials Name Effective Dates    ML Lisa Lane, MS CCC-SLP 10/04/18 -     LN Yuliya Cantrell 12/17/19 -           EDUCATION  The patient has been educated in the following areas:   Dysphagia (Swallowing Impairment) Oral Care/Hydration.    SLP Recommendation and Plan  SLP Swallowing Diagnosis: mild-moderate, oral dysfunction, suspected pharyngeal dysfunction  SLP Diet Recommendation: puree with some mashed, thin liquids  Recommended Precautions and Strategies: upright posture during/after eating, small bites of food and sips of liquid, no straw  SLP Rec. for Method of Medication Administration: meds whole, with pudding or applesauce, as tolerated(CRUSH LARGE PILLS)     Monitor for Signs of Aspiration: yes, notify SLP if any concerns  Recommended Diagnostics: reassess via clinical swallow evaluation, other (see comments)(Reassess in a few days)  Swallow Criteria for Skilled Therapeutic Interventions Met: demonstrates skilled criteria  Anticipated Dischage Disposition (SLP): anticipate therapy at next level of care  Rehab Potential/Prognosis, Swallowing: good, to achieve stated therapy goals  Therapy Frequency (Swallow): PRN  Predicted Duration Therapy Intervention (Days): until discharge                              SLP GOALS     Row Name 09/21/20 1034 09/19/20 0141          Oral Nutrition/Hydration Goal 1 (SLP)    Oral Nutrition/Hydration Goal 1, SLP  Pt  will safely swallow least restrictive diet without overt s/s of penetration/aspiration.   -LN  Pt will safely swallow least restrictive diet without overt s/s of penetration/aspiration.   -ML     Time Frame (Oral Nutrition/Hydration Goal 1, SLP)  by discharge  -LN  by discharge  -ML     Progress/Outcomes (Oral Nutrition/Hydration Goal 1, SLP)  continuing progress toward goal  -LN  --       User Key  (r) = Recorded By, (t) = Taken By, (c) = Cosigned By    Initials Name Provider Type    Lisa Kelley MS CCC-SLP Speech and Language Pathologist    Yuliya Espinal Speech and Language Pathologist           SLP Outcome Measures (last 72 hours)      SLP Outcome Measures     Row Name 09/19/20 1500             SLP Outcome Measures    Outcome Measure Used?  Adult NOMS  -ML         Adult FCM Scores    FCM Chosen  Swallowing  -ML      Swallowing FCM Score  4  -ML        User Key  (r) = Recorded By, (t) = Taken By, (c) = Cosigned By    Initials Name Effective Dates    Lisa Kelley MS CCC-SLP 10/04/18 -            Time Calculation:   Time Calculation- SLP     Row Name 09/21/20 1115             Time Calculation- SLP    SLP Start Time  1031  -LN      SLP Received On  09/21/20  -LN        User Key  (r) = Recorded By, (t) = Taken By, (c) = Cosigned By    Initials Name Provider Type    Yuliya Espinal Speech and Language Pathologist          Therapy Charges for Today     Code Description Service Date Service Provider Modifiers Qty    [id number]  ST EVAL ORAL PHARYNG SWALLOW 4 9/21/2020 Yuliya Cantrell GN 1               Yuliya Cantrell  9/21/2020

## 2020-09-21 NOTE — PLAN OF CARE
Goal Outcome Evaluation:  Plan of Care Reviewed With: patient  Progress: improving  Outcome Summary: VSS. Pt disoriented to time and situation, especially when just awakening from sleep, but re-orients quickly. Still need stool sample, pt has not had BM. Possible d/c in AM. Will CTM.

## 2020-09-23 ENCOUNTER — HOSPITAL ENCOUNTER (OUTPATIENT)
Dept: CT IMAGING | Facility: HOSPITAL | Age: 85
Discharge: HOME | End: 2020-09-23
Admitting: FAMILY MEDICINE

## 2020-09-23 DIAGNOSIS — J18.9 PNEUMONIA DUE TO INFECTIOUS ORGANISM, UNSPECIFIED LATERALITY, UNSPECIFIED PART OF LUNG: ICD-10-CM

## 2020-09-23 DIAGNOSIS — R93.89 ABNORMAL CHEST X-RAY: ICD-10-CM

## 2020-09-23 PROCEDURE — 71250 CT THORAX DX C-: CPT

## 2020-09-25 NOTE — PROGRESS NOTES
Case Management Discharge Note      Final Note: [facility] IC Level of care. jony rn/ccp    Provided Post Acute Provider List?: Refused  Refused Provider List Comment: LTC resident at [place]  Provided Post Acute Provider Quality & Resource List?: Refused  Refused Quality and Resource List Comment: LTC resident at [place]    Selected Continued Care - Discharged on 9/21/2020 Admission date: 9/18/2020 - Discharge disposition: Skilled Nursing Facility (DC - External)    Destination Coordination complete    Service Provider Selected Services Address Phone Fax    [facility] [address] [facility] [address] [phone]          Durable Medical Equipment    No services have been selected for the patient.              Dialysis/Infusion    No services have been selected for the patient.              Home Medical Care    No services have been selected for the patient.              Therapy    No services have been selected for the patient.              Community Resources    No services have been selected for the patient.                  Transportation Services  Private: Car    Final Discharge Disposition Code: 04 - intermediate care facility

## 2020-09-30 ENCOUNTER — OFFICE VISIT (OUTPATIENT)
Dept: CARDIOLOGY | Facility: CLINIC | Age: 85
End: 2020-09-30

## 2020-09-30 VITALS
SYSTOLIC BLOOD PRESSURE: 126 MMHG | OXYGEN SATURATION: 96 % | HEIGHT: 64 IN | HEART RATE: 86 BPM | WEIGHT: 124 LBS | BODY MASS INDEX: 21.17 KG/M2 | RESPIRATION RATE: 16 BRPM | DIASTOLIC BLOOD PRESSURE: 80 MMHG

## 2020-09-30 DIAGNOSIS — I61.5 INTRAVENTRICULAR HEMORRHAGE (HCC): ICD-10-CM

## 2020-09-30 DIAGNOSIS — E78.2 MIXED HYPERLIPIDEMIA: ICD-10-CM

## 2020-09-30 DIAGNOSIS — I48.19 PERSISTENT ATRIAL FIBRILLATION (HCC): Primary | ICD-10-CM

## 2020-09-30 DIAGNOSIS — I10 ESSENTIAL HYPERTENSION: ICD-10-CM

## 2020-09-30 PROCEDURE — 99214 OFFICE O/P EST MOD 30 MIN: CPT | Performed by: NURSE PRACTITIONER

## 2020-09-30 PROCEDURE — 93000 ELECTROCARDIOGRAM COMPLETE: CPT | Performed by: NURSE PRACTITIONER

## 2020-09-30 NOTE — PROGRESS NOTES
"  Date of Office Visit: 2020  Encounter Provider: TONI Gallegos  Place of Service: [facility] CARDIOLOG[facility]  Patient Name: Gaby Diaz  :2/10/1931  Primary Cardiologist: Dr. Jarvis    CC: 1 week hospital follow up    Dear Dr. Ford    HPI: Gaby Diaz is a pleasant 89 y.o. female who presents 2020 for cardiac follow up.      She has a known history of chronic underlying atrial fibrillation, hypertension, and hyperlipidemia. She had a stress nuclear study done in 2010 with a preoperative  evaluation showing anterior ischemia but she had no active angina. She went on to get a left knee replacement done. She had previously had a right knee replacement done.  Her last echocardiogram was done on 2010, showing an ejection fraction of 54% with marked biatrial enlargement, aortic valve calcification without stenosis, mild mitral insufficiency, mild tricuspid insufficiency, and normal right ventricular systolic Pressure.      She fell and fractured her left hip and was treated at [facility] with a total left hip replacement.     She had an echocardiogram done 17.  It showed ejection fraction 60%, mild right ventricular dilation, severe biatrial enlargement, moderate aortic insufficiency, moderate tricuspid insufficiency, mild-to-moderate mitral insufficiency, RVSP 50 mmHg.  She went on to have right hip replacement after this.      She presented to [facility] on 20 and found to have NSTEMI.  Plans were to transfer to [facility], but there were no beds available.  She was then  Transferred to  [facility]  with chest pressure and a \"fluttering\" in her chest.   She had a cardiac catheterization on 20 showing severe proximal LAD stenosis and moderate disease of the RCA and circumflex and proceeded with an  unsuccessful PTCA of the mid LAD.  She underwent a repeat catheterization on 20 with " "atherectomy assisted PCI.    She was started on Eliquis 2.5 mg, and continued plavix, BB, statin. She was discharged on 1/21/2020.  Her echocardiogram done 1/14/2020 showed ejection fraction 29% with severe mid to distal anterior and apical hypokinesis, possible aortic stenosis and moderate mitral insufficiency as well as moderate to severe tricuspid insufficiency.     Echo done 4/29/2020 shows ejection fraction 61-65% with severe biatrial enlargement, mild to moderate aortic insufficiency, mild to moderate tricuspid insufficiency, no pericardial effusion.     Patient presented to [facility] on 9/19/2020.  She had previously been seen at the ER at [facility] after having a fall with a scalp lack ulceration and closed head injury on 8/28/2020. She was noted to have facial ecchymosis and laceration which was repaired and at that time imaging studies reveal intraventricular hemorrhage as well as subarachnoid hemorrhage.  It appeared that patient was then transferred from the ER at [place] to [facility].  Apparently after being at the [facility] based on information gathered after discussion with ER physician patient was sent back to the [facility] and was notified to the nursing staff that she had low hemoglobin of 7.9.  While being there,  patient's nursing staff noticed her to be intermittently confused and on 9/19 2020 she did not recognize a family member who was visiting her for brief period of time.  Because of this change in mental status, as well as hemoglobin of 7.9,  EMS was called and patient was sent to [facility] Patient herself is unable to give details of her symptoms and she keeps saying \"I do not know\" to most of the questions are \"I do not remember\" to most of the questions.  CT scan of the head performed in our facility revealed small volume bilateral intraventricular hemorrhage right slightly greater than left with extensive " encephalomalacia and background small vessel disease and an area of hypoattenuation within the posterior right frontal lobe concerning for many possibilities including possibility of blood products.  Upon review of encounter on 8/28/2020 when she visited the emergency room at [facility] for head trauma secondary to moderate to severe fall with right-sided facial swelling and bruising the report read as follows:  IMPRESSION:     1. Multiple foci of subarachnoid hemorrhage along the right frontoparietal sulci, with a small amount of layering intraventricular hemorrhage, without significant associated mass effect, midline shift, or hydrocephalus.    2. Findings compatible with a background of generalized parenchymal volume loss with multiple chronic infarcts, as described above.    3. Facial soft tissue injury and right-sided facial bone fractures, better evaluated on the concurrent CT face portion of the exam.    She was evaluated by neurosurgery.  Repeat CT showed no additional hemorrhage and no hydrocephalus.  Neurosurgery okayed for discharge and recommended she follow-up as scheduled with [facility] neurosurgery who had outlined the plan for long-term follow-up.  It stated that she could restart her Brilinta 1 month following the original injury on August 28.  She was discharged back to the [facility].    The nursing home wished her to be evaluated by cardiology after her recent hospitalization.  She had been having some increased fatigue and heart rate with activity.  Her metoprolol had been increased from 6.25 twice daily to 12.5 mg twice daily with good response on her heart rate and stable blood pressure.  Physical therapy has been working with the patient but her daughter states that really she is very weak and unsteady.  The patient does not know how she fell or what happened.  The daughter states up until the fall, the patient was living by herself and that her 3 adult children would check on  "her regularly.  She states that her mom called her Wednesday and said that she needed her and when the daughter got her house she found her on her floor with \"blood everywhere\".  The patient is very frail, confused, but pleasant.  She can answer easy questions with yes or no answers but her daughter states that may not always be the correct answer.  She does deny palpitations and shortness of breath.  She is currently in a wheelchair due to weakness and instability.  She is able to swallow her pills.  The note from [facility] stated that she was on Brilinta but she was actually on Eliquis 2.5 mg twice daily for her atrial fibrillation.  Her blood pressure is stable, heart rate in the 80s with atrial fibrillation.  She brings labs from the nursing home completed 9/28/2020 BMP with sodium 141, potassium 4.5, BUN 1.3, white blood cells 9.4 with an H&H of 9.2 and 29.4.  There was a small decrease in her H&H from 9/25/2020 when it was 9.7 and 30.79  Past Medical History:   Diagnosis Date   • Arthritis of back    • Arthritis of neck    • Atrial fibrillation (CMS/HCC)    • Hip arthrosis    • Hyperlipidemia    • Hypertension    • Knee swelling    • Myocardial ischemia     inferior wall   • Stroke syndrome        Past Surgical History:   Procedure Laterality Date   • HIP SURGERY     • REPLACEMENT TOTAL KNEE Bilateral        Social History     Socioeconomic History   • Marital status:      Spouse name: Not on file   • Number of children: Not on file   • Years of education: Not on file   • Highest education level: Not on file   Tobacco Use   • Smoking status: Never Smoker   • Smokeless tobacco: Never Used   • Tobacco comment: caff use: 1 cup   Substance and Sexual Activity   • Alcohol use: No   • Drug use: No       Family History   Problem Relation Age of Onset   • Hypertension Mother    • Heart attack Father    • Heart attack Brother    • Hypertension Brother    • Stroke Brother    • Heart attack Daughter        The " following portion of the patient's history were reviewed and updated as appropriate: past medical history, past surgical history, past social history, past family history, allergies, current medications, and problem list.    Review of Systems   Constitution: Positive for malaise/fatigue. Negative for diaphoresis and fever.   HENT: Negative for congestion, hearing loss, hoarse voice, nosebleeds and sore throat.    Eyes: Negative for photophobia, vision loss in left eye, vision loss in right eye and visual disturbance.   Cardiovascular: Negative for chest pain, dyspnea on exertion, irregular heartbeat, leg swelling, near-syncope, orthopnea, palpitations, paroxysmal nocturnal dyspnea and syncope.   Respiratory: Negative for cough, hemoptysis, shortness of breath, sleep disturbances due to breathing, snoring, sputum production and wheezing.    Endocrine: Negative for cold intolerance, heat intolerance, polydipsia, polyphagia and polyuria.   Hematologic/Lymphatic: Negative for bleeding problem. Does not bruise/bleed easily.   Skin: Negative for color change, dry skin, poor wound healing, rash and suspicious lesions.   Musculoskeletal: Negative for arthritis, back pain, falls, gout, joint pain, joint swelling, muscle cramps, muscle weakness and myalgias.   Gastrointestinal: Negative for bloating, abdominal pain, constipation, diarrhea, dysphagia, melena, nausea and vomiting.   Neurological: Positive for dizziness. Negative for excessive daytime sleepiness, headaches, light-headedness, loss of balance, numbness, paresthesias, seizures, vertigo and weakness.   Psychiatric/Behavioral: Positive for memory loss. Negative for depression and substance abuse. The patient is not nervous/anxious.        No Known Allergies      Current Outpatient Medications:   •  acetaminophen (TYLENOL) 500 MG tablet, Take 500 mg by mouth Every 6 (Six) Hours As Needed for Mild Pain  or Fever., Disp: , Rfl:   •  bisacodyl (DULCOLAX) 10 MG  "suppository, Insert 10 mg into the rectum Daily As Needed for Constipation., Disp: , Rfl:   •  carboxymethylcellulose (Artificial Tears) 1 % ophthalmic solution, Administer 1 drop to both eyes 2 (Two) Times a Day As Needed., Disp: , Rfl:   •  ferrous sulfate 325 (65 FE) MG tablet, Take 325 mg by mouth 2 (two) times a day., Disp: , Rfl:   •  lactulose (CHRONULAC) 10 GM/15ML solution, Take 20 g by mouth Daily As Needed., Disp: , Rfl:   •  metoprolol tartrate (LOPRESSOR) 25 MG tablet, Take 6.25 mg by mouth 2 (Two) Times a Day., Disp: , Rfl:   •  potassium chloride (K-DUR,KLOR-CON) 20 MEQ CR tablet, Take 1 tablet by mouth 2 (Two) Times a Day., Disp: 90 tablet, Rfl: 3  •  senna (senna) 8.6 MG tablet, Take 2 tablets by mouth Every Night., Disp: , Rfl:   •  vitamin B-12 (CYANOCOBALAMIN) 500 MCG tablet, Take 1 tablet by mouth Daily., Disp:  , Rfl:         Objective:     Vitals:    09/30/20 1421   BP: 126/80   Pulse: 86   Resp: 16   SpO2: 96%   Weight: 56.2 kg (124 lb)   Height: 162.6 cm (64\")     Body mass index is 21.28 kg/m².      Constitutional:       General: Not in acute distress.     Appearance: Underweight. Frail. Ill-appearing.   Eyes:      General:         Right eye: No discharge.         Left eye: No discharge.      Conjunctiva/sclera: Conjunctivae normal.   HENT:      Head: Normocephalic and atraumatic.      Right Ear: External ear normal.      Left Ear: External ear normal.      Nose: Nose normal.   Neck:      Musculoskeletal: Normal range of motion and neck supple.      Thyroid: No thyromegaly.      Vascular: No JVD.      Trachea: No tracheal deviation.      Lymphadenopathy: No cervical adenopathy.   Pulmonary:      Effort: Pulmonary effort is normal. No respiratory distress.      Breath sounds: Normal breath sounds. No wheezing. No rales.   Chest:      Chest wall: Not tender to palpatation.   Cardiovascular:      Normal rate. Irregular rhythm.      No gallop.   Pulses:     Intact distal pulses.   Abdominal:    "   General: There is no distension.      Palpations: Abdomen is soft.      Tenderness: There is no abdominal tenderness.   Musculoskeletal: Normal range of motion.         General: No tenderness or deformity.   Skin:     General: Skin is warm and dry.      Findings: No erythema or rash.   Neurological:      Mental Status: Alert and oriented to person, place, and time.      Coordination: Coordination normal.   Psychiatric:         Attention and Perception: Attention normal.         Mood and Affect: Mood normal.         Speech: Speech normal.         Behavior: Behavior normal.         Cognition and Memory: Cognition is impaired.               ECG 12 Lead    Date/Time: 9/30/2020 4:00 PM  Performed by: Gabriella Joshi APRN  Authorized by: Gabriella Joshi APRN   Comparison: compared with previous ECG from 9/18/2020  Similar to previous ECG  Rhythm: atrial fibrillation  Rate: normal  Conduction: conduction normal  QRS axis: left    Clinical impression: abnormal EKG              Assessment:       Diagnosis Plan   1. Persistent atrial fibrillation (CMS/HCC)     2. Essential hypertension     3. Mixed hyperlipidemia     4. Intraventricular hemorrhage (CMS/HCC)            Plan:       1. Atrial fibrillation - rate controlled. Metoprolol increased to 12.5 mg recently by NH.     Off Eliquis secondary to fall and ICH.  Will D/W RM, but her risk vs. Benefit is high at this point.  2. Hypertension - controlled  3. Hyperlipidemia - per PCP  4.  ICH - S/p fall with ICH 8/28/2020. Per note from neurosurgery at [facility], follow up with [facility] neurosurgery as recommended and restart AC one month after original fall.  4. History of hypothyroidism, stable.   5. History of strokes.  6. Family history of cardiovascular disease.  7. Osteoarthritis.  8.  CAD, status post LAD PCI done 1/2020. Denies angina.  Currently not on ASA or plavix s/p fall.  9. LE edema due to venous insufficiency - none at visit today.  10. Right hip OA, s/p replacement.       Note to RM about restarting anticoagulants and AC    RTO in 6 months with RM    As always, it has been a pleasure to participate in your patient's care. Thank you.       Sincerely,       TONI Gallegos      Current Outpatient Medications:   •  acetaminophen (TYLENOL) 500 MG tablet, Take 500 mg by mouth Every 6 (Six) Hours As Needed for Mild Pain  or Fever., Disp: , Rfl:   •  bisacodyl (DULCOLAX) 10 MG suppository, Insert 10 mg into the rectum Daily As Needed for Constipation., Disp: , Rfl:   •  carboxymethylcellulose (Artificial Tears) 1 % ophthalmic solution, Administer 1 drop to both eyes 2 (Two) Times a Day As Needed., Disp: , Rfl:   •  ferrous sulfate 325 (65 FE) MG tablet, Take 325 mg by mouth 2 (two) times a day., Disp: , Rfl:   •  lactulose (CHRONULAC) 10 GM/15ML solution, Take 20 g by mouth Daily As Needed., Disp: , Rfl:   •  metoprolol tartrate (LOPRESSOR) 25 MG tablet, Take 6.25 mg by mouth 2 (Two) Times a Day., Disp: , Rfl:   •  potassium chloride (K-DUR,KLOR-CON) 20 MEQ CR tablet, Take 1 tablet by mouth 2 (Two) Times a Day., Disp: 90 tablet, Rfl: 3  •  senna (senna) 8.6 MG tablet, Take 2 tablets by mouth Every Night., Disp: , Rfl:   •  vitamin B-12 (CYANOCOBALAMIN) 500 MCG tablet, Take 1 tablet by mouth Daily., Disp:  , Rfl:     Dictated utilizing Dragon dictation

## 2020-10-07 ENCOUNTER — APPOINTMENT (OUTPATIENT)
Dept: CT IMAGING | Facility: HOSPITAL | Age: 85
End: 2020-10-07

## 2020-10-07 ENCOUNTER — HOSPITAL ENCOUNTER (INPATIENT)
Facility: HOSPITAL | Age: 85
LOS: 8 days | Discharge: NURSING, REHAB OR LONG-TERM CARE | End: 2020-10-15
Attending: HOSPITALIST | Admitting: HOSPITALIST

## 2020-10-07 ENCOUNTER — HOSPITAL ENCOUNTER (EMERGENCY)
Facility: HOSPITAL | Age: 85
Discharge: ANOTHER HOSPITAL | End: 2020-10-07
Attending: EMERGENCY MEDICINE | Admitting: EMERGENCY MEDICINE

## 2020-10-07 ENCOUNTER — APPOINTMENT (OUTPATIENT)
Dept: GENERAL RADIOLOGY | Facility: HOSPITAL | Age: 85
End: 2020-10-07

## 2020-10-07 VITALS
BODY MASS INDEX: 20.55 KG/M2 | DIASTOLIC BLOOD PRESSURE: 61 MMHG | TEMPERATURE: 97.4 F | HEART RATE: 96 BPM | SYSTOLIC BLOOD PRESSURE: 112 MMHG | WEIGHT: 120.38 LBS | RESPIRATION RATE: 30 BRPM | HEIGHT: 64 IN | OXYGEN SATURATION: 99 %

## 2020-10-07 DIAGNOSIS — I26.02 ACUTE SADDLE PULMONARY EMBOLISM WITH ACUTE COR PULMONALE (HCC): Primary | ICD-10-CM

## 2020-10-07 DIAGNOSIS — I26.99 ACUTE PULMONARY EMBOLISM, UNSPECIFIED PULMONARY EMBOLISM TYPE, UNSPECIFIED WHETHER ACUTE COR PULMONALE PRESENT (HCC): Primary | ICD-10-CM

## 2020-10-07 DIAGNOSIS — I48.0 PAROXYSMAL ATRIAL FIBRILLATION (HCC): ICD-10-CM

## 2020-10-07 DIAGNOSIS — Z86.79 HISTORY OF INTRACRANIAL HEMORRHAGE: ICD-10-CM

## 2020-10-07 LAB
ALBUMIN SERPL-MCNC: 2.9 G/DL (ref 3.5–5.2)
ALBUMIN/GLOB SERPL: 0.9 G/DL
ALP SERPL-CCNC: 157 U/L (ref 39–117)
ALT SERPL W P-5'-P-CCNC: 18 U/L (ref 1–33)
ANION GAP SERPL CALCULATED.3IONS-SCNC: 12.9 MMOL/L (ref 5–15)
AST SERPL-CCNC: 38 U/L (ref 1–32)
BACTERIA UR QL AUTO: ABNORMAL /HPF
BASOPHILS # BLD AUTO: 0.03 10*3/MM3 (ref 0–0.2)
BASOPHILS NFR BLD AUTO: 0.3 % (ref 0–1.5)
BILIRUB SERPL-MCNC: 0.4 MG/DL (ref 0–1.2)
BILIRUB UR QL STRIP: NEGATIVE
BUN SERPL-MCNC: 26 MG/DL (ref 8–23)
BUN/CREAT SERPL: 27.7 (ref 7–25)
CALCIUM SPEC-SCNC: 8.8 MG/DL (ref 8.6–10.5)
CHLORIDE SERPL-SCNC: 105 MMOL/L (ref 98–107)
CLARITY UR: CLEAR
CO2 SERPL-SCNC: 24.1 MMOL/L (ref 22–29)
COLOR UR: YELLOW
CREAT SERPL-MCNC: 0.94 MG/DL (ref 0.57–1)
D DIMER PPP FEU-MCNC: 17.7 MCGFEU/ML (ref 0–0.46)
DEPRECATED RDW RBC AUTO: 59.9 FL (ref 37–54)
EOSINOPHIL # BLD AUTO: 0.14 10*3/MM3 (ref 0–0.4)
EOSINOPHIL NFR BLD AUTO: 1.2 % (ref 0.3–6.2)
ERYTHROCYTE [DISTWIDTH] IN BLOOD BY AUTOMATED COUNT: 17.1 % (ref 12.3–15.4)
GFR SERPL CREATININE-BSD FRML MDRD: 56 ML/MIN/1.73
GLOBULIN UR ELPH-MCNC: 3.2 GM/DL
GLUCOSE SERPL-MCNC: 118 MG/DL (ref 65–99)
GLUCOSE UR STRIP-MCNC: NEGATIVE MG/DL
HCT VFR BLD AUTO: 33.5 % (ref 34–46.6)
HGB BLD-MCNC: 9.8 G/DL (ref 12–15.9)
HGB UR QL STRIP.AUTO: NEGATIVE
HYALINE CASTS UR QL AUTO: ABNORMAL /LPF
IMM GRANULOCYTES # BLD AUTO: 0.13 10*3/MM3 (ref 0–0.05)
IMM GRANULOCYTES NFR BLD AUTO: 1.1 % (ref 0–0.5)
KETONES UR QL STRIP: NEGATIVE
LEUKOCYTE ESTERASE UR QL STRIP.AUTO: NEGATIVE
LYMPHOCYTES # BLD AUTO: 3.43 10*3/MM3 (ref 0.7–3.1)
LYMPHOCYTES NFR BLD AUTO: 29.1 % (ref 19.6–45.3)
MCH RBC QN AUTO: 28.2 PG (ref 26.6–33)
MCHC RBC AUTO-ENTMCNC: 29.3 G/DL (ref 31.5–35.7)
MCV RBC AUTO: 96.3 FL (ref 79–97)
MONOCYTES # BLD AUTO: 1.02 10*3/MM3 (ref 0.1–0.9)
MONOCYTES NFR BLD AUTO: 8.7 % (ref 5–12)
NEUTROPHILS NFR BLD AUTO: 59.6 % (ref 42.7–76)
NEUTROPHILS NFR BLD AUTO: 7.03 10*3/MM3 (ref 1.7–7)
NITRITE UR QL STRIP: NEGATIVE
NRBC BLD AUTO-RTO: 0 /100 WBC (ref 0–0.2)
NT-PROBNP SERPL-MCNC: 2359 PG/ML (ref 0–1800)
PH UR STRIP.AUTO: <=5 [PH] (ref 4.5–8)
PLATELET # BLD AUTO: 228 10*3/MM3 (ref 140–450)
PMV BLD AUTO: 10.7 FL (ref 6–12)
POTASSIUM SERPL-SCNC: 4.3 MMOL/L (ref 3.5–5.2)
PROCALCITONIN SERPL-MCNC: 0.09 NG/ML (ref 0–0.25)
PROT SERPL-MCNC: 6.1 G/DL (ref 6–8.5)
PROT UR QL STRIP: ABNORMAL
RBC # BLD AUTO: 3.48 10*6/MM3 (ref 3.77–5.28)
RBC # UR: ABNORMAL /HPF
REF LAB TEST METHOD: ABNORMAL
SARS-COV-2 RNA PNL SPEC NAA+PROBE: NOT DETECTED
SODIUM SERPL-SCNC: 142 MMOL/L (ref 136–145)
SP GR UR STRIP: 1.02 (ref 1–1.03)
SQUAMOUS #/AREA URNS HPF: ABNORMAL /HPF
TROPONIN T SERPL-MCNC: 0.04 NG/ML (ref 0–0.03)
URATE CRY URNS QL MICRO: ABNORMAL /HPF
UROBILINOGEN UR QL STRIP: ABNORMAL
WBC # BLD AUTO: 11.78 10*3/MM3 (ref 3.4–10.8)
WBC UR QL AUTO: ABNORMAL /HPF

## 2020-10-07 PROCEDURE — 84145 PROCALCITONIN (PCT): CPT | Performed by: EMERGENCY MEDICINE

## 2020-10-07 PROCEDURE — 80053 COMPREHEN METABOLIC PANEL: CPT | Performed by: EMERGENCY MEDICINE

## 2020-10-07 PROCEDURE — 85379 FIBRIN DEGRADATION QUANT: CPT | Performed by: EMERGENCY MEDICINE

## 2020-10-07 PROCEDURE — 96360 HYDRATION IV INFUSION INIT: CPT

## 2020-10-07 PROCEDURE — 84484 ASSAY OF TROPONIN QUANT: CPT | Performed by: EMERGENCY MEDICINE

## 2020-10-07 PROCEDURE — 85025 COMPLETE CBC W/AUTO DIFF WBC: CPT | Performed by: EMERGENCY MEDICINE

## 2020-10-07 PROCEDURE — 99285 EMERGENCY DEPT VISIT HI MDM: CPT | Performed by: EMERGENCY MEDICINE

## 2020-10-07 PROCEDURE — 99285 EMERGENCY DEPT VISIT HI MDM: CPT

## 2020-10-07 PROCEDURE — 93010 ELECTROCARDIOGRAM REPORT: CPT | Performed by: INTERNAL MEDICINE

## 2020-10-07 PROCEDURE — 96361 HYDRATE IV INFUSION ADD-ON: CPT

## 2020-10-07 PROCEDURE — P9612 CATHETERIZE FOR URINE SPEC: HCPCS

## 2020-10-07 PROCEDURE — 0 IOPAMIDOL PER 1 ML: Performed by: EMERGENCY MEDICINE

## 2020-10-07 PROCEDURE — 93005 ELECTROCARDIOGRAM TRACING: CPT | Performed by: EMERGENCY MEDICINE

## 2020-10-07 PROCEDURE — 83880 ASSAY OF NATRIURETIC PEPTIDE: CPT | Performed by: EMERGENCY MEDICINE

## 2020-10-07 PROCEDURE — 71045 X-RAY EXAM CHEST 1 VIEW: CPT

## 2020-10-07 PROCEDURE — 71275 CT ANGIOGRAPHY CHEST: CPT

## 2020-10-07 PROCEDURE — 87635 SARS-COV-2 COVID-19 AMP PRB: CPT | Performed by: EMERGENCY MEDICINE

## 2020-10-07 PROCEDURE — 81001 URINALYSIS AUTO W/SCOPE: CPT | Performed by: EMERGENCY MEDICINE

## 2020-10-07 RX ORDER — DEXTROSE AND SODIUM CHLORIDE 5; .45 G/100ML; G/100ML
100 INJECTION, SOLUTION INTRAVENOUS CONTINUOUS
Status: DISCONTINUED | OUTPATIENT
Start: 2020-10-07 | End: 2020-10-07 | Stop reason: HOSPADM

## 2020-10-07 RX ADMIN — DEXTROSE AND SODIUM CHLORIDE 100 ML/HR: 5; 450 INJECTION, SOLUTION INTRAVENOUS at 15:44

## 2020-10-07 RX ADMIN — IOPAMIDOL 100 ML: 755 INJECTION, SOLUTION INTRAVENOUS at 12:32

## 2020-10-07 NOTE — ED NOTES
Call placed to [facility] Intensivist. Dr Boyce has been paged and will call back      Michaelle De Oliveira  10/07/20 [id number]       Michaelle De Oliveira  10/07/20 [id number]

## 2020-10-07 NOTE — ED NOTES
Patient's Daughter is waiting on campus for the patient on the premises in the car. Daughter's name is Romelia Bingham. The phone number is: [phone].      Miroslava Dutton  10/07/20 1138

## 2020-10-07 NOTE — ED PROVIDER NOTES
Subjective   History of Present Illness  History of Present Illness  Patient was placed in isolation and examined while donned with appropriate PPE including N 95 mask and face shield.     Chief complaint: Shortness of breath    Location: [place]    Quality/Severity: Sats of 80% on room air    Timing/Onset/Duration: Noted this more    Modifying Factors: Improved with    Associated Symptoms: No headache.  No fever chills or cough.  No sore throat earache or nasal congestion.  Patient denies chest pain.  No abdominal pain.  No diarrhea or burning when she urinates.  No nausea or vomiting.    Narrative: This 89-year-old white female who is DNR, presents with shortness of breath that started today.  Patient has a history of A. fib and is not on blood thinner.  The patient had intraventricular hemorrhage August 28, 2020 of this year.        Review of Systems   Constitutional: Negative for chills and fever.   HENT: Negative for ear pain and sore throat.    Respiratory: Positive for shortness of breath. Negative for cough and chest tightness.    Cardiovascular: Positive for palpitations. Negative for chest pain and leg swelling.   Gastrointestinal: Negative for abdominal pain, diarrhea, nausea and vomiting.   Genitourinary: Negative for difficulty urinating and dysuria.   Musculoskeletal: Negative for back pain, neck pain and neck stiffness.   Skin: Negative for rash.   Neurological: Negative for speech difficulty, weakness, numbness and headaches.   Psychiatric/Behavioral: Negative.  Negative for confusion.        Medication List      ASK your doctor about these medications    acetaminophen 500 MG tablet  Commonly known as: TYLENOL     Artificial Tears 1 % ophthalmic solution  Generic drug: carboxymethylcellulose     bisacodyl 10 MG suppository  Commonly known as: DULCOLAX     ferrous sulfate 325 (65 FE) MG tablet     lactulose 10 GM/15ML solution  Commonly known as: CHRONULAC     metoprolol tartrate 25 MG  tablet  Commonly known as: LOPRESSOR     potassium chloride 20 MEQ CR tablet  Commonly known as: K-DUR,KLOR-CON  Take 1 tablet by mouth 2 (Two) Times a Day.     senna 8.6 MG tablet  Commonly known as: SENOKOT     vitamin B-12 500 MCG tablet  Commonly known as: CYANOCOBALAMIN  Take 1 tablet by mouth Daily.            Past Medical History:   Diagnosis Date   • Arthritis of back    • Arthritis of neck    • Atrial fibrillation (CMS/HCC)    • Hip arthrosis    • Hyperlipidemia    • Hypertension    • Knee swelling    • Myocardial ischemia     inferior wall   • Stroke syndrome        No Known Allergies    Past Surgical History:   Procedure Laterality Date   • HIP SURGERY     • REPLACEMENT TOTAL KNEE Bilateral        Family History   Problem Relation Age of Onset   • Hypertension Mother    • Heart attack Father    • Heart attack Brother    • Hypertension Brother    • Stroke Brother    • Heart attack Daughter        Social History     Socioeconomic History   • Marital status:      Spouse name: Not on file   • Number of children: Not on file   • Years of education: Not on file   • Highest education level: Not on file   Tobacco Use   • Smoking status: Never Smoker   • Smokeless tobacco: Never Used   • Tobacco comment: caff use: 1 cup   Substance and Sexual Activity   • Alcohol use: No   • Drug use: No           Objective   Physical Exam  Vitals signs and nursing note reviewed.   Constitutional:       General: She is not in acute distress.     Appearance: She is well-developed. She is not diaphoretic.      Comments: ED Triage Vitals  Temp: 98 °F (36.7 °C) (10/07/20 0934)  Heart Rate: (!) 124 (10/07/20 0934)  Resp: (!) 34 (10/07/20 0934)  BP: 105/65 (10/07/20 0943)  SpO2: 95 % (10/07/20 0934)  Temp src: Oral (10/07/20 0934)  Heart Rate Source: Monitor (10/07/20 0934)  Patient Position: n/a  BP Location: n/a  FiO2 (%): n/a    The patient's vitals were reviewed by me.  Unless otherwise noted they are within normal limits.   The patient is in A. fib with a rate of 124.  She is tachypneic with a respiratory rate of 34.  Her sats are 95% on 4 L     HENT:      Head: Normocephalic and atraumatic.      Right Ear: External ear normal.      Left Ear: External ear normal.      Nose: Nose normal.      Mouth/Throat:      Mouth: Mucous membranes are moist.   Eyes:      General:         Right eye: No discharge.         Left eye: No discharge.      Conjunctiva/sclera: Conjunctivae normal.      Pupils: Pupils are equal, round, and reactive to light.   Neck:      Musculoskeletal: Normal range of motion and neck supple.      Thyroid: No thyromegaly.      Vascular: No JVD.      Trachea: No tracheal deviation.   Cardiovascular:      Rate and Rhythm: Tachycardia present. Rhythm irregular.      Heart sounds: Normal heart sounds. No murmur. No friction rub. No gallop.    Pulmonary:      Effort: Pulmonary effort is normal. No respiratory distress.      Breath sounds: No stridor. Rales (In the bases) present. No wheezing.   Chest:      Chest wall: No tenderness.   Abdominal:      General: Bowel sounds are normal. There is no distension.      Palpations: Abdomen is soft. There is no mass.      Tenderness: There is no abdominal tenderness. There is no guarding or rebound.      Hernia: No hernia is present.   Musculoskeletal: Normal range of motion.         General: No deformity.      Right lower leg: No edema.      Left lower leg: No edema.   Lymphadenopathy:      Cervical: No cervical adenopathy.   Skin:     General: Skin is warm and dry.      Coloration: Skin is not pale.      Findings: No erythema or rash.   Neurological:      General: No focal deficit present.      Mental Status: She is alert and oriented to person, place, and time.   Psychiatric:         Behavior: Behavior normal.         Procedures           ED Course  ED Course as of Oct 07 1259   Wed Oct 07, 2020   1105 The laboratory values were reviewed by me.  The urine microscopic is 0-2, the  d-dimer 17.7.  The white blood cell count 11.7.  The hemoglobin is 9.8.  The hematocrit is 33.  The absolute neutrophil count is 7.  The glucose is 118, the BUN is 26 and the albumin is 2.9.  The AST is 88.  The alk phos is 157.  The GFR 56.  The procalcitonin, BNP, and troponin are pending.  Laboratory values are otherwise unremarkable.    [RC]   1153 The urine microscopic shows 0-2 red blood cells.  The troponin is 0.043.  The serum glucose is 118.  The BUN is 26 and the albumin is 2.9.  The AST is 38.  The alk phos is 157.  The GFR is 56.  The proBNP is 2359.  The d-dimer is 17.7.  The white blood cell count is 11.7.  The hemoglobin is 9.8.  The hematocrit is 33.  The laboratory values are otherwise unremarkable.    [RC]      ED Course User Index  [RC] Elieser Li MD      09:50 EDT, 10/07/20:  The EKG was obtained at 936 and read by me at 937.  EKG shows atrial fibrillation with rate of 130.  There is a normal axis with no hypertrophy.  The QRS and QT intervals are unremarkable.  There is repolarization abnormalities probably rate related and the QT is borderline prolonged at 489 ms.  There is no acute ST elevation or depression.    13:01 EDT, 10/07/20:  The patient was reassessed.  Her sats are 95% on 4 L/min.  Her heart rate is 105.  Vital signs are otherwise stable.  She has no complaints.    13:02 EDT, 10/07/20:  The patient's diagnosis of pulmonary embolus was discussed with her.  Given the fact that this patient had a recent intracranial hemorrhage, anticoagulation is contraindicated.  She will be transferred to the intensive care unit at [facility] for further work-up and evaluation which may include a filter placement.  All the patient's questions were answered she will be transferred in serious condition.    13:02 EDT, 10/07/20:  The intensivist at [facility] was been paged out.  I am awaiting return of his call.     13:05 EDT, 10/07/20:  I spoke with Dr. Boyce, the  intensivist with [facility].  He will accept the patient.     13:11 EDT, 10/07/20:  I spoke with Romelia Juárez, the patient's daughter, she was made aware of the patient's diagnosis and disposition plan.  All of her questions were answered.                           [facility]  No orders to display     Labs Reviewed - No data to display  Ct Head Without Contrast    Result Date: 9/19/2020  Narrative: CT SCAN OF THE BRAIN WITHOUT CONTRAST  HISTORY: Transient confusion. Follow-up of intraventricular hemorrhage. The CT scan was performed through the brain without contrast and compared to yesterday's exam and again demonstrates extensive diffuse atrophy and chronic small vessel ischemic change. Again noted is a tiny amount of acute blood layering in both occipital horns without change. There appears to be an area of old infarction in the right parietal region. There is an area of focal increased density in the posterolateral right frontal lobe that is unchanged from yesterday and may represent mineralization related to old infarct. I cannot completely exclude acute hemorrhage at this location but the appearance remains unchanged.  There is extensive mucosal thickening in the sphenoid sinus with associated chronic wall thickening. There is also some mucosal thickening scattered in the maxillary sinuses, right greater than left that is unchanged.  CONCLUSION: Extensive diffuse atrophy and old ischemic changes. Very small amount of acute intraventricular hemorrhage layering in both occipital horns unchanged from yesterday. Small focal area of mineralization versus acute intraparenchymal hemorrhage in posterolateral right frontal lobe also unchanged. See above discussion.  Radiation dose reduction techniques were utilized, including automated exposure control and exposure modulation based on body size.  This report was finalized on 9/19/2020 2:41 PM by Dr. Jose Garcia M.D.      Ct Head Without Contrast    Result  Date: 9/18/2020  Narrative: EXAM:  CT HEAD WO CONTRAST-  HISTORY:  Transient confusion.  COMPARISON:  None.  TECHNIQUE: Noncontrast images of the brain were obtained. Reformatted images were reviewed. Radiation dose reduction techniques were utilized, including automated exposure control and exposure modulation based on body size.  FINDINGS:   There is small volume intraventricular hemorrhage layering within the occipital horns bilaterally, right slightly greater than left. There is mild curvilinear hyperattenuation within the posterior right frontal lobe with an area of encephalomalacia/gliosis, which could be due to mineralization from laminar necrosis or blood products. No pathologic extra-axial fluid collection is identified. There are moderate to large areas of encephalomalacia/gliosis involving the right MCA territory, left occipital lobe, and bilateral cerebellar hemispheres. There is a chronic infarct involving the right caudate body. These findings are superimposed on at least moderate small vessel ischemic disease, overall limiting evaluation for detection of acute infarction. There is ex vacuo dilatation of the lateral ventricles, right greater than left. There is background moderate to advanced global parenchymal volume loss. There is a partially empty sella. There is extensive calcific intracranial atherosclerosis with involvement of the posterior circulation. There are bilateral ocular lens replacements. There is near complete opacification of the right sphenoid sinus with surrounding osteitis, suggestive of chronicity. There is moderate right and mild left mucosal thickening of the maxillary sinuses. Associated hyperattenuation within the right maxillary sinus can be seen with fungal colonization or inspissated secretions. There is bilateral temporomandibular joint disease. There are old fractures of the right orbital floor, right lateral orbital wall, and maxillary sinus walls, likely from old [facility]  [place].      Impression: 1.  Small volume bilateral intraventricular hemorrhage, right slightly greater than left. 2.  Extensive encephalomalacia/gliosis and background small vessel ischemic disease limit evaluation for acute infarction. An area of curvilinear hyperattenuation within the posterior right frontal lobe, along an area of encephalomalacia/gliosis, may represent mineralization from limited necrosis versus blood products. Comparison with prior imaging would be helpful, if available. Further evaluation with contrast-enhanced MRI is recommended. 3.  Old right facial bone fractures likely secondary to remote [facility] fracture. 4.  Hyperattenuation associated with mucosal thickening in the right maxillary sinus and sphenoid sinus can be seen with fungal colonization or inspissated secretions.  Emergent findings discussed with Dr. Alvarenga at 4:45 PM.  This report was finalized on 9/18/2020 4:52 PM by Dr. Nell Crisostomo M.D.      Ct Chest Without Contrast    Result Date: 9/23/2020  Narrative: CT Chest WO INDICATION: Recent diagnosis of pneumonia with abnormal chest x-ray. History of shortness of air and cough. TECHNIQUE: CT of the thorax without IV contrast. Coronal and sagittal reconstructions were obtained.  Radiation dose reduction techniques included automated exposure control or exposure modulation based on body size. Count of known CT and cardiac nuc med studies performed in previous 12 months: 2. COMPARISON: Chest x-ray 1/13/2020 FINDINGS: There is underlying significant kyphoscoliosis. Images through the thoracic inlet are normal There is a tortuous ectatic thoracic aorta with maximum dimension of the ascending aorta 3.9 cm which is borderline for aneurysm. There is mild cardiomegaly without pericardial fluid. There is a large Bochdalek type hernia containing both stomach, omental fat and loops of colon. Hernia sac measures up to 15.2 cm transversely, 7.0 cm in anterior to posterior. No bowel obstruction or  incarceration is seen. There is a moderate dependent right pleural effusion and a small left dependent pleural effusion. The upper lobes of the lungs are clear. There is some parenchymal density with air bronchograms at both medial lower lobes likely representing atelectasis. The liver is normal. The right kidney is very atrophic. Air is some fluid around the left kidney of uncertain significance. Bone window images demonstrate no compression fracture. There are no rib fractures. No pneumothorax.     Impression: 1. Severe kyphoscoliosis with no acute fracture seen. 2. Large Bochdalek type hernia containing stomach, omental fat and loops of colon. No obstruction seen. 3. Moderately large right pleural effusion with very small left pleural effusion. 4. Parenchymal density) the left lung base with air bronchograms favored to represent compressive atelectasis. The upper lungs are clear. Signer Name: Cecelia Singleton MD  Signed: 9/23/2020 12:21 PM  Workstation Name: [id number]  [facility]    Xr Chest 1 View    Result Date: 9/18/2020  Narrative: ONE VIEW PORTABLE CHEST  HISTORY: Confusion. Hypertension.  FINDINGS: There are no prior exams for comparison. The lungs are moderately well-expanded with what appears to be some localized pleural effusion and probable atelectasis and pneumonia at the right base. There is a large hiatus hernia extending more into the left chest and measuring up to 14.5 cm. The heart is mildly enlarged without overt CHF.  This report was finalized on 9/18/2020 4:27 PM by Dr. Jose Garcia M.D.        Final diagnoses:   Acute pulmonary embolism, unspecified pulmonary embolism type, unspecified whether acute cor pulmonale present (CMS/HCC)   Paroxysmal atrial fibrillation (CMS/HCC)   History of intracranial hemorrhage         ED Medications:  Medications - No data to display    New Medications:     Medication List      ASK your doctor about these medications     acetaminophen 500 MG tablet  Commonly known as: TYLENOL     Artificial Tears 1 % ophthalmic solution  Generic drug: carboxymethylcellulose     bisacodyl 10 MG suppository  Commonly known as: DULCOLAX     ferrous sulfate 325 (65 FE) MG tablet     lactulose 10 GM/15ML solution  Commonly known as: CHRONULAC     metoprolol tartrate 25 MG tablet  Commonly known as: LOPRESSOR     potassium chloride 20 MEQ CR tablet  Commonly known as: K-DUR,KLOR-CON  Take 1 tablet by mouth 2 (Two) Times a Day.     senna 8.6 MG tablet  Commonly known as: SENOKOT     vitamin B-12 500 MCG tablet  Commonly known as: CYANOCOBALAMIN  Take 1 tablet by mouth Daily.            Stopped Medications:     Medication List      ASK your doctor about these medications    acetaminophen 500 MG tablet  Commonly known as: TYLENOL     Artificial Tears 1 % ophthalmic solution  Generic drug: carboxymethylcellulose     bisacodyl 10 MG suppository  Commonly known as: DULCOLAX     ferrous sulfate 325 (65 FE) MG tablet     lactulose 10 GM/15ML solution  Commonly known as: CHRONULAC     metoprolol tartrate 25 MG tablet  Commonly known as: LOPRESSOR     potassium chloride 20 MEQ CR tablet  Commonly known as: K-DUR,KLOR-CON  Take 1 tablet by mouth 2 (Two) Times a Day.     senna 8.6 MG tablet  Commonly known as: SENOKOT     vitamin B-12 500 MCG tablet  Commonly known as: CYANOCOBALAMIN  Take 1 tablet by mouth Daily.              Final diagnoses:   Acute pulmonary embolism, unspecified pulmonary embolism type, unspecified whether acute cor pulmonale present (CMS/HCC)   Paroxysmal atrial fibrillation (CMS/HCC)   History of intracranial hemorrhage            Elieser Li MD  10/07/20 1308       Elieser Li MD  10/07/20 1311

## 2020-10-08 ENCOUNTER — APPOINTMENT (OUTPATIENT)
Dept: CT IMAGING | Facility: HOSPITAL | Age: 85
End: 2020-10-08

## 2020-10-08 ENCOUNTER — APPOINTMENT (OUTPATIENT)
Dept: CARDIOLOGY | Facility: HOSPITAL | Age: 85
End: 2020-10-08

## 2020-10-08 PROBLEM — I26.99 PULMONARY EMBOLI (HCC): Status: ACTIVE | Noted: 2020-10-08

## 2020-10-08 PROBLEM — I26.99 PULMONARY EMBOLUS (HCC): Status: ACTIVE | Noted: 2020-10-08

## 2020-10-08 LAB
ANION GAP SERPL CALCULATED.3IONS-SCNC: 11.4 MMOL/L (ref 5–15)
AORTIC DIMENSIONLESS INDEX: 0.5 (DI)
APTT PPP: 30.9 SECONDS (ref 22.7–35.4)
APTT PPP: 44.9 SECONDS (ref 22.7–35.4)
BASOPHILS # BLD AUTO: 0.02 10*3/MM3 (ref 0–0.2)
BASOPHILS NFR BLD AUTO: 0.2 % (ref 0–1.5)
BH CV ECHO MEAS - ACS: 0.9 CM
BH CV ECHO MEAS - AI DEC SLOPE: 262 CM/SEC^2
BH CV ECHO MEAS - AI MAX PG: 76.4 MMHG
BH CV ECHO MEAS - AI MAX VEL: 437 CM/SEC
BH CV ECHO MEAS - AI P1/2T: 488.5 MSEC
BH CV ECHO MEAS - AO MAX PG: 9 MMHG
BH CV ECHO MEAS - AO MEAN PG (FULL): 4 MMHG
BH CV ECHO MEAS - AO MEAN PG: 5 MMHG
BH CV ECHO MEAS - AO ROOT AREA (BSA CORRECTED): 1.9
BH CV ECHO MEAS - AO ROOT AREA: 7.1 CM^2
BH CV ECHO MEAS - AO ROOT DIAM: 3 CM
BH CV ECHO MEAS - AO V2 MAX: 150 CM/SEC
BH CV ECHO MEAS - AO V2 MEAN: 109 CM/SEC
BH CV ECHO MEAS - AO V2 VTI: 28.2 CM
BH CV ECHO MEAS - ASC AORTA: 3.4 CM
BH CV ECHO MEAS - AVA(I,A): 1.4 CM^2
BH CV ECHO MEAS - AVA(I,D): 1.4 CM^2
BH CV ECHO MEAS - BSA(HAYCOCK): 1.6 M^2
BH CV ECHO MEAS - BSA: 1.6 M^2
BH CV ECHO MEAS - BZI_BMI: 20.6 KILOGRAMS/M^2
BH CV ECHO MEAS - BZI_METRIC_HEIGHT: 162.6 CM
BH CV ECHO MEAS - BZI_METRIC_WEIGHT: 54.4 KG
BH CV ECHO MEAS - EDV(CUBED): 32.8 ML
BH CV ECHO MEAS - EDV(MOD-SP2): 46 ML
BH CV ECHO MEAS - EDV(MOD-SP4): 43 ML
BH CV ECHO MEAS - EDV(TEICH): 41 ML
BH CV ECHO MEAS - EF(CUBED): 67.5 %
BH CV ECHO MEAS - EF(MOD-BP): 63 %
BH CV ECHO MEAS - EF(MOD-SP2): 63 %
BH CV ECHO MEAS - EF(MOD-SP4): 62.8 %
BH CV ECHO MEAS - EF(TEICH): 60.4 %
BH CV ECHO MEAS - ESV(CUBED): 10.6 ML
BH CV ECHO MEAS - ESV(MOD-SP2): 17 ML
BH CV ECHO MEAS - ESV(MOD-SP4): 16 ML
BH CV ECHO MEAS - ESV(TEICH): 16.2 ML
BH CV ECHO MEAS - FS: 31.3 %
BH CV ECHO MEAS - IVS/LVPW: 1.2
BH CV ECHO MEAS - IVSD: 1.4 CM
BH CV ECHO MEAS - LV DIASTOLIC VOL/BSA (35-75): 27.3 ML/M^2
BH CV ECHO MEAS - LV MASS(C)D: 135.7 GRAMS
BH CV ECHO MEAS - LV MASS(C)DI: 86.2 GRAMS/M^2
BH CV ECHO MEAS - LV MAX PG: 2 MMHG
BH CV ECHO MEAS - LV MEAN PG: 1 MMHG
BH CV ECHO MEAS - LV SYSTOLIC VOL/BSA (12-30): 10.2 ML/M^2
BH CV ECHO MEAS - LV V1 MAX: 69 CM/SEC
BH CV ECHO MEAS - LV V1 MEAN: 45.2 CM/SEC
BH CV ECHO MEAS - LV V1 VTI: 12.4 CM
BH CV ECHO MEAS - LVIDD: 3.2 CM
BH CV ECHO MEAS - LVIDS: 2.2 CM
BH CV ECHO MEAS - LVLD AP2: 6.6 CM
BH CV ECHO MEAS - LVLD AP4: 6.1 CM
BH CV ECHO MEAS - LVLS AP2: 5.3 CM
BH CV ECHO MEAS - LVLS AP4: 5.1 CM
BH CV ECHO MEAS - LVOT AREA (M): 3.1 CM^2
BH CV ECHO MEAS - LVOT AREA: 3.1 CM^2
BH CV ECHO MEAS - LVOT DIAM: 2 CM
BH CV ECHO MEAS - LVPWD: 1.2 CM
BH CV ECHO MEAS - MR MAX PG: 95.6 MMHG
BH CV ECHO MEAS - MR MAX VEL: 489 CM/SEC
BH CV ECHO MEAS - MV DEC SLOPE: 304 CM/SEC^2
BH CV ECHO MEAS - MV DEC TIME: 150 SEC
BH CV ECHO MEAS - MV E MAX VEL: 68.9 CM/SEC
BH CV ECHO MEAS - MV MEAN PG: 1 MMHG
BH CV ECHO MEAS - MV P1/2T MAX VEL: 81.4 CM/SEC
BH CV ECHO MEAS - MV P1/2T: 78.4 MSEC
BH CV ECHO MEAS - MV V2 MEAN: 48 CM/SEC
BH CV ECHO MEAS - MV V2 VTI: 14.3 CM
BH CV ECHO MEAS - MVA P1/2T LCG: 2.7 CM^2
BH CV ECHO MEAS - MVA(P1/2T): 2.8 CM^2
BH CV ECHO MEAS - MVA(VTI): 2.7 CM^2
BH CV ECHO MEAS - PA ACC SLOPE: 1377 CM/SEC^2
BH CV ECHO MEAS - PA ACC TIME: 0.06 SEC
BH CV ECHO MEAS - PA MAX PG (FULL): 2.8 MMHG
BH CV ECHO MEAS - PA MAX PG: 3.3 MMHG
BH CV ECHO MEAS - PA PR(ACCEL): 50.7 MMHG
BH CV ECHO MEAS - PA V2 MAX: 91.1 CM/SEC
BH CV ECHO MEAS - PULM DIAS VEL: 38 CM/SEC
BH CV ECHO MEAS - PULM S/D: 0.69
BH CV ECHO MEAS - PULM SYS VEL: 26.2 CM/SEC
BH CV ECHO MEAS - RAP SYSTOLE: 3 MMHG
BH CV ECHO MEAS - RV MAX PG: 0.54 MMHG
BH CV ECHO MEAS - RV MEAN PG: 0 MMHG
BH CV ECHO MEAS - RV V1 MAX: 36.8 CM/SEC
BH CV ECHO MEAS - RV V1 MEAN: 23.5 CM/SEC
BH CV ECHO MEAS - RV V1 VTI: 4.2 CM
BH CV ECHO MEAS - RVSP: 34.6 MMHG
BH CV ECHO MEAS - SI(AO): 126.6 ML/M^2
BH CV ECHO MEAS - SI(CUBED): 14.1 ML/M^2
BH CV ECHO MEAS - SI(LVOT): 24.7 ML/M^2
BH CV ECHO MEAS - SI(MOD-SP2): 18.4 ML/M^2
BH CV ECHO MEAS - SI(MOD-SP4): 17.2 ML/M^2
BH CV ECHO MEAS - SI(TEICH): 15.7 ML/M^2
BH CV ECHO MEAS - SV(AO): 199.3 ML
BH CV ECHO MEAS - SV(CUBED): 22.1 ML
BH CV ECHO MEAS - SV(LVOT): 39 ML
BH CV ECHO MEAS - SV(MOD-SP2): 29 ML
BH CV ECHO MEAS - SV(MOD-SP4): 27 ML
BH CV ECHO MEAS - SV(TEICH): 24.8 ML
BH CV ECHO MEAS - TAPSE (>1.6): 1.1 CM
BH CV ECHO MEAS - TR MAX VEL: 281 CM/SEC
BH CV LOW VAS LEFT PERONEAL VESSEL: 1
BH CV LOW VAS LEFT POPLITEAL SPONT: 1
BH CV LOW VAS LEFT POSTERIOR TIBIAL VESSEL: 1
BH CV LOW VAS LEFT SOLEAL VESSEL: 1
BH CV LOW VAS RIGHT GASTRONEMIUS VESSEL: 1
BH CV LOW VAS RIGHT LESSER SAPH VESSEL: 1
BH CV LOW VAS RIGHT PERONEAL VESSEL: 1
BH CV LOW VAS RIGHT POPLITEAL SPONT: 1
BH CV LOW VAS RIGHT POSTERIOR TIBIAL VESSEL: 1
BH CV LOW VAS RIGHT SOLEAL VESSEL: 1
BH CV LOWER VASCULAR LEFT COMMON FEMORAL AUGMENT: NORMAL
BH CV LOWER VASCULAR LEFT COMMON FEMORAL COMPETENT: NORMAL
BH CV LOWER VASCULAR LEFT COMMON FEMORAL COMPRESS: NORMAL
BH CV LOWER VASCULAR LEFT COMMON FEMORAL PHASIC: NORMAL
BH CV LOWER VASCULAR LEFT COMMON FEMORAL SPONT: NORMAL
BH CV LOWER VASCULAR LEFT DISTAL FEMORAL COMPRESS: NORMAL
BH CV LOWER VASCULAR LEFT GASTRONEMIUS COMPRESS: NORMAL
BH CV LOWER VASCULAR LEFT GREATER SAPH AK COMPRESS: NORMAL
BH CV LOWER VASCULAR LEFT GREATER SAPH BK COMPRESS: NORMAL
BH CV LOWER VASCULAR LEFT LESSER SAPH COMPRESS: NORMAL
BH CV LOWER VASCULAR LEFT MID FEMORAL AUGMENT: NORMAL
BH CV LOWER VASCULAR LEFT MID FEMORAL COMPETENT: NORMAL
BH CV LOWER VASCULAR LEFT MID FEMORAL COMPRESS: NORMAL
BH CV LOWER VASCULAR LEFT MID FEMORAL PHASIC: NORMAL
BH CV LOWER VASCULAR LEFT MID FEMORAL SPONT: NORMAL
BH CV LOWER VASCULAR LEFT PERONEAL COMPRESS: NORMAL
BH CV LOWER VASCULAR LEFT PERONEAL THROMBUS: NORMAL
BH CV LOWER VASCULAR LEFT POPLITEAL AUGMENT: NORMAL
BH CV LOWER VASCULAR LEFT POPLITEAL COMPRESS: NORMAL
BH CV LOWER VASCULAR LEFT POPLITEAL PHASIC: NORMAL
BH CV LOWER VASCULAR LEFT POPLITEAL SPONT: NORMAL
BH CV LOWER VASCULAR LEFT POPLITEAL THROMBUS: NORMAL
BH CV LOWER VASCULAR LEFT POSTERIOR TIBIAL COMPRESS: NORMAL
BH CV LOWER VASCULAR LEFT POSTERIOR TIBIAL THROMBUS: NORMAL
BH CV LOWER VASCULAR LEFT PROFUNDA FEMORAL COMPRESS: NORMAL
BH CV LOWER VASCULAR LEFT PROXIMAL FEMORAL COMPRESS: NORMAL
BH CV LOWER VASCULAR LEFT SAPHENOFEMORAL JUNCTION COMPRESS: NORMAL
BH CV LOWER VASCULAR LEFT SOLEAL COMPRESS: NORMAL
BH CV LOWER VASCULAR LEFT SOLEAL THROMBUS: NORMAL
BH CV LOWER VASCULAR RIGHT COMMON FEMORAL AUGMENT: NORMAL
BH CV LOWER VASCULAR RIGHT COMMON FEMORAL COMPETENT: NORMAL
BH CV LOWER VASCULAR RIGHT COMMON FEMORAL COMPRESS: NORMAL
BH CV LOWER VASCULAR RIGHT COMMON FEMORAL PHASIC: NORMAL
BH CV LOWER VASCULAR RIGHT COMMON FEMORAL SPONT: NORMAL
BH CV LOWER VASCULAR RIGHT DISTAL FEMORAL COMPRESS: NORMAL
BH CV LOWER VASCULAR RIGHT GASTRONEMIUS COMPRESS: NORMAL
BH CV LOWER VASCULAR RIGHT GASTRONEMIUS THROMBUS: NORMAL
BH CV LOWER VASCULAR RIGHT GREATER SAPH AK COMPRESS: NORMAL
BH CV LOWER VASCULAR RIGHT GREATER SAPH BK COMPRESS: NORMAL
BH CV LOWER VASCULAR RIGHT LESSER SAPH COMPRESS: NORMAL
BH CV LOWER VASCULAR RIGHT LESSER SAPH THROMBUS: NORMAL
BH CV LOWER VASCULAR RIGHT MID FEMORAL AUGMENT: NORMAL
BH CV LOWER VASCULAR RIGHT MID FEMORAL COMPETENT: NORMAL
BH CV LOWER VASCULAR RIGHT MID FEMORAL COMPRESS: NORMAL
BH CV LOWER VASCULAR RIGHT MID FEMORAL PHASIC: NORMAL
BH CV LOWER VASCULAR RIGHT MID FEMORAL SPONT: NORMAL
BH CV LOWER VASCULAR RIGHT PERONEAL COMPRESS: NORMAL
BH CV LOWER VASCULAR RIGHT PERONEAL THROMBUS: NORMAL
BH CV LOWER VASCULAR RIGHT POPLITEAL PHASIC: NORMAL
BH CV LOWER VASCULAR RIGHT POPLITEAL SPONT: NORMAL
BH CV LOWER VASCULAR RIGHT POPLITEAL THROMBUS: NORMAL
BH CV LOWER VASCULAR RIGHT POSTERIOR TIBIAL COMPRESS: NORMAL
BH CV LOWER VASCULAR RIGHT POSTERIOR TIBIAL THROMBUS: NORMAL
BH CV LOWER VASCULAR RIGHT PROFUNDA FEMORAL COMPRESS: NORMAL
BH CV LOWER VASCULAR RIGHT PROXIMAL FEMORAL COMPRESS: NORMAL
BH CV LOWER VASCULAR RIGHT SAPHENOFEMORAL JUNCTION COMPRESS: NORMAL
BH CV LOWER VASCULAR RIGHT SOLEAL COMPRESS: NORMAL
BH CV LOWER VASCULAR RIGHT SOLEAL THROMBUS: NORMAL
BH CV VAS BP RIGHT ARM: NORMAL MMHG
BH CV XLRA - RV BASE: 3 CM
BH CV XLRA - RV LENGTH: 5 CM
BH CV XLRA - RV MID: 2 CM
BH CV XLRA - TDI S': 6 CM/SEC
BUN SERPL-MCNC: 31 MG/DL (ref 8–23)
BUN/CREAT SERPL: 29.2 (ref 7–25)
CALCIUM SPEC-SCNC: 8.2 MG/DL (ref 8.6–10.5)
CHLORIDE SERPL-SCNC: 110 MMOL/L (ref 98–107)
CO2 SERPL-SCNC: 21.6 MMOL/L (ref 22–29)
CREAT SERPL-MCNC: 1.06 MG/DL (ref 0.57–1)
DEPRECATED RDW RBC AUTO: 47 FL (ref 37–54)
DEPRECATED RDW RBC AUTO: 49.1 FL (ref 37–54)
EOSINOPHIL # BLD AUTO: 0.02 10*3/MM3 (ref 0–0.4)
EOSINOPHIL NFR BLD AUTO: 0.2 % (ref 0.3–6.2)
ERYTHROCYTE [DISTWIDTH] IN BLOOD BY AUTOMATED COUNT: 14.9 % (ref 12.3–15.4)
ERYTHROCYTE [DISTWIDTH] IN BLOOD BY AUTOMATED COUNT: 15.1 % (ref 12.3–15.4)
GFR SERPL CREATININE-BSD FRML MDRD: 49 ML/MIN/1.73
GLUCOSE SERPL-MCNC: 94 MG/DL (ref 65–99)
HCT VFR BLD AUTO: 27.2 % (ref 34–46.6)
HCT VFR BLD AUTO: 28.2 % (ref 34–46.6)
HGB BLD-MCNC: 8.7 G/DL (ref 12–15.9)
HGB BLD-MCNC: 8.9 G/DL (ref 12–15.9)
IMM GRANULOCYTES # BLD AUTO: 0.09 10*3/MM3 (ref 0–0.05)
IMM GRANULOCYTES NFR BLD AUTO: 1 % (ref 0–0.5)
INR PPP: 1.06 (ref 0.9–1.1)
LEFT ATRIUM VOLUME INDEX: 39 ML/M2
LYMPHOCYTES # BLD AUTO: 1.98 10*3/MM3 (ref 0.7–3.1)
LYMPHOCYTES NFR BLD AUTO: 23 % (ref 19.6–45.3)
MCH RBC QN AUTO: 28.1 PG (ref 26.6–33)
MCH RBC QN AUTO: 28.8 PG (ref 26.6–33)
MCHC RBC AUTO-ENTMCNC: 31.6 G/DL (ref 31.5–35.7)
MCHC RBC AUTO-ENTMCNC: 32 G/DL (ref 31.5–35.7)
MCV RBC AUTO: 89 FL (ref 79–97)
MCV RBC AUTO: 90.1 FL (ref 79–97)
MONOCYTES # BLD AUTO: 0.98 10*3/MM3 (ref 0.1–0.9)
MONOCYTES NFR BLD AUTO: 11.4 % (ref 5–12)
NEUTROPHILS NFR BLD AUTO: 5.51 10*3/MM3 (ref 1.7–7)
NEUTROPHILS NFR BLD AUTO: 64.2 % (ref 42.7–76)
NRBC BLD AUTO-RTO: 0 /100 WBC (ref 0–0.2)
PLATELET # BLD AUTO: 200 10*3/MM3 (ref 140–450)
PLATELET # BLD AUTO: 200 10*3/MM3 (ref 140–450)
PMV BLD AUTO: 10.1 FL (ref 6–12)
PMV BLD AUTO: 10.5 FL (ref 6–12)
POTASSIUM SERPL-SCNC: 5.4 MMOL/L (ref 3.5–5.2)
PROTHROMBIN TIME: 13.7 SECONDS (ref 11.7–14.2)
RBC # BLD AUTO: 3.02 10*6/MM3 (ref 3.77–5.28)
RBC # BLD AUTO: 3.17 10*6/MM3 (ref 3.77–5.28)
SODIUM SERPL-SCNC: 143 MMOL/L (ref 136–145)
WBC # BLD AUTO: 8.6 10*3/MM3 (ref 3.4–10.8)
WBC # BLD AUTO: 8.63 10*3/MM3 (ref 3.4–10.8)

## 2020-10-08 PROCEDURE — 93970 EXTREMITY STUDY: CPT

## 2020-10-08 PROCEDURE — 85027 COMPLETE CBC AUTOMATED: CPT | Performed by: HOSPITALIST

## 2020-10-08 PROCEDURE — 85610 PROTHROMBIN TIME: CPT | Performed by: SURGERY

## 2020-10-08 PROCEDURE — 80048 BASIC METABOLIC PNL TOTAL CA: CPT | Performed by: HOSPITALIST

## 2020-10-08 PROCEDURE — 99222 1ST HOSP IP/OBS MODERATE 55: CPT | Performed by: INTERNAL MEDICINE

## 2020-10-08 PROCEDURE — 93306 TTE W/DOPPLER COMPLETE: CPT | Performed by: INTERNAL MEDICINE

## 2020-10-08 PROCEDURE — 70450 CT HEAD/BRAIN W/O DYE: CPT

## 2020-10-08 PROCEDURE — 93306 TTE W/DOPPLER COMPLETE: CPT

## 2020-10-08 PROCEDURE — 85025 COMPLETE CBC W/AUTO DIFF WBC: CPT | Performed by: SURGERY

## 2020-10-08 PROCEDURE — 25010000002 HEPARIN (PORCINE) PER 1000 UNITS: Performed by: SURGERY

## 2020-10-08 PROCEDURE — 85730 THROMBOPLASTIN TIME PARTIAL: CPT | Performed by: INTERNAL MEDICINE

## 2020-10-08 PROCEDURE — 85730 THROMBOPLASTIN TIME PARTIAL: CPT | Performed by: SURGERY

## 2020-10-08 RX ORDER — LACTULOSE 10 G/15ML
20 SOLUTION ORAL DAILY PRN
Status: DISCONTINUED | OUTPATIENT
Start: 2020-10-08 | End: 2020-10-15 | Stop reason: HOSPADM

## 2020-10-08 RX ORDER — NITROGLYCERIN 0.4 MG/1
0.4 TABLET SUBLINGUAL
Status: DISCONTINUED | OUTPATIENT
Start: 2020-10-08 | End: 2020-10-15 | Stop reason: HOSPADM

## 2020-10-08 RX ORDER — SODIUM CHLORIDE 0.9 % (FLUSH) 0.9 %
10 SYRINGE (ML) INJECTION AS NEEDED
Status: DISCONTINUED | OUTPATIENT
Start: 2020-10-08 | End: 2020-10-15 | Stop reason: HOSPADM

## 2020-10-08 RX ORDER — SENNA PLUS 8.6 MG/1
2 TABLET ORAL NIGHTLY
Status: DISCONTINUED | OUTPATIENT
Start: 2020-10-08 | End: 2020-10-15 | Stop reason: HOSPADM

## 2020-10-08 RX ORDER — HEPARIN SODIUM 5000 [USP'U]/ML
30-60 INJECTION, SOLUTION INTRAVENOUS; SUBCUTANEOUS EVERY 6 HOURS PRN
Status: DISCONTINUED | OUTPATIENT
Start: 2020-10-08 | End: 2020-10-11

## 2020-10-08 RX ORDER — CHOLECALCIFEROL (VITAMIN D3) 125 MCG
500 CAPSULE ORAL DAILY
Status: DISCONTINUED | OUTPATIENT
Start: 2020-10-08 | End: 2020-10-15 | Stop reason: HOSPADM

## 2020-10-08 RX ORDER — ONDANSETRON 2 MG/ML
4 INJECTION INTRAMUSCULAR; INTRAVENOUS EVERY 6 HOURS PRN
Status: DISCONTINUED | OUTPATIENT
Start: 2020-10-08 | End: 2020-10-15 | Stop reason: HOSPADM

## 2020-10-08 RX ORDER — SODIUM CHLORIDE 0.9 % (FLUSH) 0.9 %
10 SYRINGE (ML) INJECTION EVERY 12 HOURS SCHEDULED
Status: DISCONTINUED | OUTPATIENT
Start: 2020-10-08 | End: 2020-10-15 | Stop reason: HOSPADM

## 2020-10-08 RX ORDER — SODIUM CHLORIDE, SODIUM LACTATE, POTASSIUM CHLORIDE, CALCIUM CHLORIDE 600; 310; 30; 20 MG/100ML; MG/100ML; MG/100ML; MG/100ML
50 INJECTION, SOLUTION INTRAVENOUS CONTINUOUS
Status: DISCONTINUED | OUTPATIENT
Start: 2020-10-08 | End: 2020-10-08

## 2020-10-08 RX ORDER — CEFAZOLIN SODIUM 2 G/100ML
2 INJECTION, SOLUTION INTRAVENOUS ONCE
Status: CANCELLED | OUTPATIENT
Start: 2020-10-08 | End: 2020-10-08

## 2020-10-08 RX ORDER — HEPARIN SODIUM 10000 [USP'U]/100ML
12 INJECTION, SOLUTION INTRAVENOUS
Status: DISCONTINUED | OUTPATIENT
Start: 2020-10-08 | End: 2020-10-11

## 2020-10-08 RX ADMIN — SODIUM CHLORIDE, PRESERVATIVE FREE 10 ML: 5 INJECTION INTRAVENOUS at 12:13

## 2020-10-08 RX ADMIN — SENNOSIDES 2 TABLET: 8.6 TABLET, FILM COATED ORAL at 20:01

## 2020-10-08 RX ADMIN — METOPROLOL TARTRATE 6.25 MG: 25 TABLET, FILM COATED ORAL at 12:12

## 2020-10-08 RX ADMIN — Medication 500 MCG: at 12:11

## 2020-10-08 RX ADMIN — METOPROLOL TARTRATE 6.25 MG: 25 TABLET, FILM COATED ORAL at 20:01

## 2020-10-08 RX ADMIN — SODIUM CHLORIDE, PRESERVATIVE FREE 10 ML: 5 INJECTION INTRAVENOUS at 20:01

## 2020-10-08 RX ADMIN — HEPARIN SODIUM 12 UNITS/KG/HR: 10000 INJECTION, SOLUTION INTRAVENOUS at 12:23

## 2020-10-08 RX ADMIN — HEPARIN SODIUM 3300 UNITS: 5000 INJECTION INTRAVENOUS; SUBCUTANEOUS at 19:08

## 2020-10-08 RX ADMIN — SODIUM CHLORIDE, PRESERVATIVE FREE 10 ML: 5 INJECTION INTRAVENOUS at 01:44

## 2020-10-08 NOTE — ED NOTES
Intensivist returned call and informed of patient going to telemetry bed and not ICU     Xiomara Talbot RN  10/07/20 2646

## 2020-10-08 NOTE — CONSULTS
[facility] CONSULT NOTE    Patient name: Gaby Diaz  Referring Provider: Dr. Rondon  Reason for Consultation: Clearance for anticoagulation    Patient Care Team:  Arsh Ford MD as PCP - General  Arsh Ford MD as PCP - Family Medicine    Chief complaint: Pulmonary embolus    Subjective .     History of present illness:    Patient is a 89 y.o.  female with history of fall in late August 2020 context of chronic anticoagulation with Eliquis for atrial fibrillation..  She was originally seen at [facility] and transferred to [facility] secondary to orbital fracture and focal right parietal subarachnoid hemorrhage/intraventricular hemorrhage.  No records are available from the [facility] facility.  [place] records reviewed indicating CT findings and plan for transfer.  Patient was then evaluated by Dr. Romero on 9/19/20 regarding persistent right frontal parietal SAH and intraventricular hemorrhage.  She had had no further falls.  She was admitted for confusion and anemia.  CTs were stable on follow-up.  Patient was advised to follow-up with [facility] regarding consideration of resumption of her anticoagulant.  She has not had follow-up.    Current issue is related to onset of difficulty breathing yesterday as described as shortness of air.  She had associated decreased O2 sats but no chest pain.  She was admitted for findings of submassive pulmonary embolus.  Vascular surgery has recommended anticoagulation or filter.  They feel that anticoagulation is more imperative as the patient already has a very large clot with heart strain.  Placing filter would not treat this finding but would prevent further clots.  They have Doppler studies pending of the lower extremities.  Currently, patient denies any headache.  She reports no additional falls.  No dizziness or visual changes.  I spoke with her daughter, Romelia Bingham, via telephone.  She confirmed that the patient has had no  further falls and no other neurologic issues.  She also confirms that the patient has never had any follow-up with the [facility].  She has remained off of her anticoagulation.    Review of Systems  Review of Systems   Constitutional: Negative for chills and fever.   Respiratory: Positive for shortness of breath.    Cardiovascular: Negative for chest pain.   Gastrointestinal: Negative for nausea and vomiting.   Neurological: Negative for dizziness, speech difficulty, weakness and headaches.   Psychiatric/Behavioral: Negative for confusion.       History  PAST MEDICAL HISTORY  Past Medical History:   Diagnosis Date   • Arthritis of back    • Arthritis of neck    • Atrial fibrillation (CMS/HCC)    • Hip arthrosis    • Hyperlipidemia    • Hypertension    • Knee swelling    • Myocardial ischemia     inferior wall   • Stroke syndrome        PAST SURGICAL HISTORY  Past Surgical History:   Procedure Laterality Date   • HIP SURGERY     • REPLACEMENT TOTAL KNEE Bilateral        FAMILY HISTORY  Family History   Problem Relation Age of Onset   • Hypertension Mother    • Heart attack Father    • Heart attack Brother    • Hypertension Brother    • Stroke Brother    • Heart attack Daughter        SOCIAL HISTORY  Social History     Tobacco Use   • Smoking status: Never Smoker   • Smokeless tobacco: Never Used   • Tobacco comment: caff use: 1 cup   Substance Use Topics   • Alcohol use: No   • Drug use: No       retired  Lives alone.  Has 3 children who are active in her care.    Allergies:  Patient has no known allergies.    MEDICATIONS:  Medications Prior to Admission   Medication Sig Dispense Refill Last Dose   • acetaminophen (TYLENOL) 500 MG tablet Take 500 mg by mouth Every 6 (Six) Hours As Needed for Mild Pain  or Fever.      • bisacodyl (DULCOLAX) 10 MG suppository Insert 10 mg into the rectum Daily As Needed for Constipation.      • carboxymethylcellulose (Artificial Tears) 1 % ophthalmic solution Administer 1 drop  to both eyes 2 (Two) Times a Day As Needed.      • ferrous sulfate 325 (65 FE) MG tablet Take 325 mg by mouth 2 (two) times a day.      • lactulose (CHRONULAC) 10 GM/15ML solution Take 20 g by mouth Daily As Needed.      • metoprolol tartrate (LOPRESSOR) 25 MG tablet Take 6.25 mg by mouth 2 (Two) Times a Day.      • potassium chloride (K-DUR,KLOR-CON) 20 MEQ CR tablet Take 1 tablet by mouth 2 (Two) Times a Day. 90 tablet 3    • senna (senna) 8.6 MG tablet Take 2 tablets by mouth Every Night.      • vitamin B-12 (CYANOCOBALAMIN) 500 MCG tablet Take 1 tablet by mouth Daily.          Current Facility-Administered Medications:   •  [START ON 10/9/2020] influenza vac split quad (FLUZONE,FLUARIX,AFLURIA,FLULAVAL) injection 0.5 mL, 0.5 mL, Intramuscular, Once, Flakito Lyons MD  •  lactulose (CHRONULAC) 10 GM/15ML solution 20 g, 20 g, Oral, Daily PRN, Flakito Lyons MD  •  metoprolol tartrate (LOPRESSOR) tablet 6.25 mg, 6.25 mg, Oral, BID, Flakito Lyons MD  •  nitroglycerin (NITROSTAT) SL tablet 0.4 mg, 0.4 mg, Sublingual, Q5 Min PRN, Flakito Lyons MD  •  ondansetron (ZOFRAN) injection 4 mg, 4 mg, Intravenous, Q6H PRN, Flakito Lyons MD  •  senna (SENOKOT) tablet 2 tablet, 2 tablet, Oral, Nightly, Flakito Lyons MD  •  sodium chloride 0.9 % flush 10 mL, 10 mL, Intravenous, Q12H, Flakito Lyons MD, 10 mL at 10/08/20 0144  •  sodium chloride 0.9 % flush 10 mL, 10 mL, Intravenous, PRN, Flakito Lyons MD  •  vitamin B-12 (CYANOCOBALAMIN) tablet 500 mcg, 500 mcg, Oral, Daily, Flakito Lyons MD    Objective     Results Review:  LABS:    Admission on 10/07/2020   Component Date Value Ref Range Status   • Glucose 10/08/2020 94  65 - 99 mg/dL Final   • BUN 10/08/2020 31* 8 - 23 mg/dL Final   • Creatinine 10/08/2020 1.06* 0.57 - 1.00 mg/dL Final   • Sodium 10/08/2020 143  136 - 145 mmol/L Final   • Potassium 10/08/2020 5.4* 3.5 - 5.2 mmol/L Final   • Chloride 10/08/2020 110* 98 - 107 mmol/L Final   • CO2  10/08/2020 21.6* 22.0 - 29.0 mmol/L Final   • Calcium 10/08/2020 8.2* 8.6 - 10.5 mg/dL Final   • eGFR Non African Amer 10/08/2020 49* >60 mL/min/1.73 Final   • BUN/Creatinine Ratio 10/08/2020 29.2* 7.0 - 25.0 Final   • Anion Gap 10/08/2020 11.4  5.0 - 15.0 mmol/L Final   • WBC 10/08/2020 8.63  3.40 - 10.80 10*3/mm3 Final   • RBC 10/08/2020 3.02* 3.77 - 5.28 10*6/mm3 Final   • Hemoglobin 10/08/2020 8.7* 12.0 - 15.9 g/dL Final   • Hematocrit 10/08/2020 27.2* 34.0 - 46.6 % Final   • MCV 10/08/2020 90.1  79.0 - 97.0 fL Final   • MCH 10/08/2020 28.8  26.6 - 33.0 pg Final   • MCHC 10/08/2020 32.0  31.5 - 35.7 g/dL Final   • RDW 10/08/2020 15.1  12.3 - 15.4 % Final   • RDW-SD 10/08/2020 49.1  37.0 - 54.0 fl Final   • MPV 10/08/2020 10.1  6.0 - 12.0 fL Final   • Platelets 10/08/2020 200  140 - 450 10*3/mm3 Final       DIAGNOSTICS:  CT head-tiny amount of residual bilateral occipital horn ventricular hemorrhage as compared to prior study on 9/19/2020.    Results Review:   I reviewed the patient's new clinical results.  I personally viewed and interpreted the patient's CT head.  Also discussed with Dr. Romero and Dr. Rosen.    Vital Signs   Temp:  [97.4 °F (36.3 °C)-98.4 °F (36.9 °C)] 97.8 °F (36.6 °C)  Heart Rate:  [] 103  Resp:  [26-36] 32  BP: ()/(26-94) 115/89    Physical Exam:  Physical Exam  Vitals signs reviewed.   Constitutional:       General: She is not in acute distress.     Appearance: She is ill-appearing.      Comments: Thin, elderly female in no acute distress   HENT:      Mouth/Throat:      Comments: No perioral cyanosis  Eyes:      Extraocular Movements: EOM normal.      Pupils: Pupils are equal, round, and reactive to light.   Cardiovascular:      Rate and Rhythm: Normal rate.   Pulmonary:      Effort: Tachypnea present. No respiratory distress ( While lying flat in bed).      Comments: Nasal cannula O2  Skin:     General: Skin is warm and dry.      Capillary Refill: Capillary refill takes  less than 2 seconds.   Neurological:      Mental Status: She is alert and oriented to person, place, and time.      Coordination: Finger-Nose-Finger Test normal.   Psychiatric:         Mood and Affect: Mood normal.         Speech: Speech normal.         Thought Content: Thought content normal.         Judgment: Judgment normal.       Neurologic Exam     Mental Status   Oriented to person, place, and time.   Attention: normal. Concentration: normal.   Speech: speech is normal   Level of consciousness: alert  Knowledge: good.   Normal comprehension.     Cranial Nerves     CN II   Visual fields full to confrontation.     CN III, IV, VI   Pupils are equal, round, and reactive to light.  Extraocular motions are normal.   Right pupil: Size: 3 mm. Shape: regular. Reactivity: brisk.   Left pupil: Size: 3 mm. Shape: regular. Reactivity: brisk.   CN III: no CN III palsy  CN VI: no CN VI palsy  Nystagmus: none   Diplopia: none    CN V   Facial sensation intact.     CN VII   Facial expression full, symmetric.     CN VIII   CN VIII normal.     CN IX, X   CN IX normal.   CN X normal.     CN XI   CN XI normal.     CN XII   CN XII normal.     Motor Exam   Right arm pronator drift: absent  Left arm pronator drift: absent    Gait, Coordination, and Reflexes     Gait  Gait: (Not tested due to respiratory issues)    Coordination   Finger to nose coordination: normal    Reflexes   Right plantar: normal  Left plantar: normal  Right Arrington: absent  Left Arrington: absent  Right ankle clonus: absent  Left ankle clonus: absent      Assessment/Plan       Pulmonary embolus (CMS/HCC)    Atrial fibrillation (CMS/HCC)    Hyperlipidemia    Essential hypertension    Anemia    DNR (do not resuscitate)    Dementia without behavioral disturbance (CMS/HCC)    Pleural effusion on right    Pulmonary emboli (CMS/HCC)      PLAN: Patient with history of fall with subarachnoid hemorrhage and small intraventricular hemorrhage that occurred approximately 5 to  6 weeks ago.  She was originally seen in [place] then followed at [facility].  She was seen back here a few weeks ago with repeat CT head which was stable to improving.  She is readmitted overnight due to shortness of air with findings of submassive pulmonary embolus.  Patient has been off anticoagulants since her fall.  She has had no follow-up with her primary neurosurgeon at the [facility] who was following the hemorrhage.  She denies any headache.  CT scan today reveals small amount of residual bilateral occipital horn intraventricular hemorrhage.  There are no new areas of hemorrhage in the area of right frontal parietal subarachnoid hemorrhage has resolved.  She has no focal neurologic deficits on exam.    With regard to anticoagulation, from a neurosurgical standpoint, there is risk of unknown severity of re-hemorrhage as we do not know specifically the etiology of her hemorrhage as it was not a witnessed fall.  However the pattern of hemorrhage (focal small area) indicates trauma versus spontaneous subarachnoid hemorrhage.  She has no known history of aneurysm per her daughter.  Although the interventricular hemorrhage still persists, it has slowly dissipated over time.  Unfortunately, the patient is in a difficult situation as she has a history of atrial fibrillation with stroke.  She was on anticoagulation for this.  This has been off and now she has developed a submassive pulmonary embolus which without anticoagulation could certainly progress.  I spoken to the vascular surgeon, Dr. Omer, as did Dr. Rosen.  A filter can be placed to prevent further clots, but that does not treat the submassive pulmonary embolus.  Dr. Omer feels that anticoagulation is her best treatment with regard to the PE.  I explained to the patient and her daughter that we cannot determine her risk of rehemorrhage in the brain but there is some potential.  However, the risk of death with a submassive PE and treated is likely much  "greater.  I explained that they should do what they feel is best with regard to risk-benefit ratio.  The patient and her daughter both feel they would like to proceed with anticoagulation despite potential intracranial hemorrhage.  I have advised Dr. Omer of the family's decision.  We certainly will remain available if something changes.  We will do follow-up CT head tomorrow to evaluate any changes since initiation of anti coagulation.    I discussed the patient's findings and my recommendations with patient, family, nursing staff and Dr Rosen, Dr. Romero, Dr. Kan Mahmood, APRN  10/08/20  08:41 EDT    \"Dictated utilizing Dragon dictation\".      "

## 2020-10-08 NOTE — PLAN OF CARE
Goal Outcome Evaluation:        Outcome Summary: Pt a DA from [facility]. Pt admitted with elevated d dimer and bilat PEs. Pt currnetly not on any anticoag due to recent brain bleed. Pt in Afib and alert to person and place. Pt on 3-4L nc. VSS. Continue to monitor. Safety maintained.

## 2020-10-08 NOTE — PROGRESS NOTES
Discharge Planning Assessment  [facility]     Patient Name: Gaby Diaz  MRN: [id number]  Today's Date: 10/8/2020    Admit Date: 10/7/2020    Discharge Needs Assessment     Row Name 10/08/20 1154       Living Environment    Lives With  facility resident    Current Living Arrangements  extended care facility    Provides Primary Care For  no one, unable/limited ability to care for self    Family Caregiver if Needed  child(yony), adult    Quality of Family Relationships  helpful;supportive;involved    Able to Return to Prior Arrangements  yes       Transition Planning    Patient/Family Anticipates Transition to  inpatient rehabilitation facility    Patient/Family Anticipated Services at Transition  skilled nursing    Transportation Anticipated  family or friend will provide       Discharge Needs Assessment    Readmission Within the Last 30 Days  no previous admission in last 30 days    Current Outpatient/Agency/Support Group  skilled nursing facility    Equipment Needed After Discharge  walker, rolling;hospital bed    Discharge Facility/Level of Care Needs  nursing facility, skilled    Provided Post Acute Provider List?  N/A    Provided Post Acute Provider Quality & Resource List?  Refused    Refused Quality and Resource List Comment  Plan is to return to [facility]        Discharge Plan     Row Name 10/08/20 2153       Plan    Plan  Plan is return to [facility] upon DC.  Will need precert to return skilled.    Plan Comments  S/W pt's dtr Romelia at bedside.  Facesheet info confirmed.  Pt was admitted from [facility] and plan is for her to return there upon DC.  CCP spoke with Antoine/  at [facility] who confirms pt is from a skilled bed, no bedhold. Will need [employer] MCR precert if pt is to return skilled.  If precert denied, they will accept pt back pvt pay/ Medicaid Pending.  CCP discussed this with pt's dtr Romelia who voices understanding of the process.  PT eval requested when appropriate.         Continued Care and Services - Admitted Since 10/7/2020     Destination     Service Provider Request Status Selected Services Address Phone Fax    [facility]  Pending - Request Sent N/A [address] [place] [address] [phone]            Selected Continued Care - Prior Encounters Includes selections from prior encounters from 7/9/2020 to 10/8/2020    Discharged on 9/21/2020 Admission date: 9/18/2020 - Discharge disposition: Skilled Nursing Facility (DC - External)    Destination     Service Provider Selected Services Address Phone Fax    [facility]  [facility] [address] [place] [address] [phone]                      Demographic Summary     Row Name 10/08/20 1153       General Information    Admission Type  inpatient    Arrived From  subacute/long-term acute care    Referral Source  admission list    Reason for Consult  discharge planning    Preferred Language  English        Functional Status     Row Name 10/08/20 1153       Functional Status, IADL    Medications  assistive person    Meal Preparation  assistive person    Housekeeping  assistive person    Laundry  assistive person    Shopping  assistive person            Lissa Melo RN

## 2020-10-08 NOTE — DISCHARGE PLACEMENT REQUEST
"Gaby Adames (89 y.o. Female)     Date of Birth Social Security Number Address Home Phone MRN    02/10/1931  [address] [id number] [phone] [id number]    Amish Marital Status          [place]        Admission Date Admission Type Admitting Provider Attending Provider Department, Room/Bed    10/7/20 Urgent Flakito Lyons MD [place], Robert Russell, MD [address], [address]    Discharge Date Discharge Disposition Discharge Destination                       Attending Provider: Alexis Wisdom MD    Allergies: No Known Allergies    Isolation: None   Infection: None   Code Status: No CPR    Ht: 162.6 cm (64.02\")   Wt: 54.4 kg (120 lb)    Admission Cmt: None   Principal Problem: Pulmonary embolus (CMS/HCC) [I26.99]                 Active Insurance as of 10/7/2020     Primary Coverage     Payor Plan Insurance Group Employer/Plan Group    [employer] MEDICARE REPLACEMENT HU[employer] MEDICARE REPLACEMENT [id number]     Payor Plan Address Payor Plan Phone Number Payor Plan Fax Number Effective Dates    [address] [address] [phone]  1/1/2018 - None Entered    [facility] [address]       Subscriber Name Subscriber Birth Date Member ID       GABY ADAMES 2/10/1931 [id number]                 Emergency Contacts      (Rel.) Home Phone Work Phone Mobile Phone    ZcaheryRomelia (Daughter) -- -- [phone]    EmilyMargon (Son) -- -- [phone]    Liana Robles (Daughter) -- -- [phone]              "

## 2020-10-08 NOTE — H&P
[facility]    Patient Identification:  Name: Gaby Diaz  Age: 89 y.o.  Sex: female  :  2/10/1931  MRN: [id number]         Primary Care Physician: Arsh Ford MD    cc: JEREMIAH Lopez   Patient is a 89 y.o. female with history of demenita sent from outside hospital for PE. she was sent to [facility] for shortness of breath reportedly 80% on room air. She is not the best historian (thought she was from home but was apparently from NH) but states she was short of breath but doesn't recal specifics. She has a history of atrial fibrillation but had a traumatic SAH and intraventricular hemorrhage august-. at [facility] she was found to have extensive PE and elevated RH pressures. AC was felt to be contraindicated d/t head bleed. initially the plan was to transfer to ICU for further evaluation/treatment however no beds were available. after further observation she was felt to be hemodynamically stable and oxygenating fine and was felt to be stable for transfer to a telemetry bed and so we were called. I discussed with ED provider at [facility] and Dr. Mon of pulmonology service here.     Past Medical History:   Diagnosis Date   • Arthritis of back    • Arthritis of neck    • Atrial fibrillation (CMS/HCC)    • Hip arthrosis    • Hyperlipidemia    • Hypertension    • Knee swelling    • Myocardial ischemia     inferior wall   • Stroke syndrome      Past Surgical History:   Procedure Laterality Date   • HIP SURGERY     • REPLACEMENT TOTAL KNEE Bilateral      Current medications reviewed.    Family History   Problem Relation Age of Onset   • Hypertension Mother    • Heart attack Father    • Heart attack Brother    • Hypertension Brother    • Stroke Brother    • Heart attack Daughter      Social History     Tobacco Use   • Smoking status: Never Smoker   • Smokeless tobacco: Never Used   • Tobacco comment: caff use: 1 cup   Substance Use Topics     • Alcohol use: No   • Drug use: No     Review of Systems   Constitutional: Negative.  Negative for chills and fever.   HENT: Negative.    Eyes: Negative.    Respiratory: Positive for shortness of breath. Negative for cough and chest tightness.    Cardiovascular: Positive for leg swelling. Negative for chest pain.   Gastrointestinal: Negative.  Negative for abdominal pain, diarrhea, nausea and vomiting.   Endocrine: Negative.    Genitourinary: Negative.    Musculoskeletal: Negative.    Skin: Negative.    Allergic/Immunologic: Negative.    Neurological: Negative.    Hematological: Negative.    Psychiatric/Behavioral: Negative.      Objective      Vital Signs  Temp:  [97.4 °F (36.3 °C)-98.4 °F (36.9 °C)] 97.8 °F (36.6 °C)  Heart Rate:  [] 103  Resp:  [26-36] 32  BP: ()/(26-94) 115/89  There is no height or weight on file to calculate BMI.    Physical Exam  Constitutional:       General: She is not in acute distress.     Appearance: She is well-developed. She is not ill-appearing or toxic-appearing.      Comments: frail, elderly   HENT:      Head: Normocephalic and atraumatic.   Eyes:      Conjunctiva/sclera: Conjunctivae normal.   Neck:      Musculoskeletal: Neck supple.      Trachea: No tracheal deviation.   Cardiovascular:      Rate and Rhythm: Normal rate. Rhythm irregularly irregular.      Pulses:           Radial pulses are 2+ on the right side and 2+ on the left side.        Dorsalis pedis pulses are 2+ on the right side and 2+ on the left side.   Pulmonary:      Effort: Tachypnea and respiratory distress (mild) present.      Breath sounds: Decreased breath sounds present. No wheezing, rhonchi or rales.   Abdominal:      General: Bowel sounds are normal. There is no distension.      Palpations: Abdomen is soft.      Tenderness: There is no abdominal tenderness. There is no guarding or rebound.   Musculoskeletal:      Right lower leg: Edema present.      Left lower leg: Edema present.      Comments:  1+ R, trace left   Lymphadenopathy:      Cervical: No cervical adenopathy.   Skin:     General: Skin is warm and dry.   Neurological:      Mental Status: She is alert.      Comments: oriented to [facility], year, self, but not situation or specifics   Psychiatric:         Behavior: Behavior normal.       While in the room and during my examination of the patient I wore gloves, mask, eye protection.  I washed my hands before and after this patient encounter.     Results Review:  I reviewed the patient's new clinical results.  I reviewed the patient's new imaging results and agree with the interpretation.  I personally viewed and interpreted the patient's EKG/Telemetry data.    Results for orders placed during the hospital encounter of 04/29/20   Adult Transthoracic Echo Complete W/ Cont if Necessary Per Protocol    Narrative · Estimated EF appears to be in the range of 61 - 65%  · Normal right ventricular systolic function noted. Right ventricular   cavity is mildly dilated.  · Left atrial cavity size is severely dilated.  · Right atrial cavity size is severely dilated.  · Mild to moderate aortic valve regurgitation is present  · Mild to moderate tricuspid valve regurgitation is present  · Calculated right ventricular systolic pressure from tricuspid   regurgitation is 25.0 mmHg.  · There is no evidence of pericardial effusion.         Lab Results   Component Value Date    ANIONGAP 12.9 10/07/2020     Estimated Creatinine Clearance: 35 mL/min (by C-G formula based on SCr of 0.94 mg/dL).    Assessment/Plan   Active Hospital Problems    Diagnosis  POA   • **Pulmonary embolus (CMS/HCC) [I26.99]  Unknown   • DNR (do not resuscitate) [Z66]  Unknown   • Dementia without behavioral disturbance (CMS/HCC) [F03.90]  Unknown   • Pleural effusion on right [J90]  Unknown   • Anemia [D64.9]  Yes   • Atrial fibrillation (CMS/HCC) [I48.91]  Yes   • Essential hypertension [I10]  Yes   • Hyperlipidemia [E78.5]  Unknown      Resolved  Hospital Problems   No resolved problems to display.     89 y.o. female send from [facility] for PE, extensive, RV:LV 1.4. also persistent right pleural effusion    · currently hemodynamically stable  · age and previous bleeding (very small on CT report) felt to be high risk for AC. review of notes in epic KEISHA stated hold brilinta for a month and then follow up with  neurosurgery- will get records and consult neurosurgery and check a head CT   · consult pulmonology- plan had been to place an IVC filter  · check legs for clot. L>R swollen  · check echo  · DNR.  · discussed with patient and nursing staff and Dr. Mon and ED provider at [place] around 8:39 PM. .    Flakito Lyons MD  10/08/20  00:24 EDT

## 2020-10-08 NOTE — CONSULTS
[facility] Consult Note    Patient Name: Gaby Diaz  :2/10/1931  89 y.o.    Date of Admission: 10/7/2020  Date of Consultation:  10/08/20  Encounter Provider: Arpita March MD  Place of Service: [facility] CARDIOLOG[facility]  Referring Provider: Flakito Lyons MD  Patient Care Team:  Arsh Ford MD as PCP - General  Arsh Ford MD as PCP - Family Medicine      Chief complaint: Pulmonary Embolism    Reason for Consult:  Pulmonary Embolism    History of Present Illness:    Ms Diaz is an 89 year old patient of Dr Jarvis with a history of chronic atrial fibrillation, hypertension, coronary disease (status post prior drug-eluting stent placement to LAD), stroke, hypothyroidism, hyperlipidemia, recent intracranial and subarachnoid hemorrhage, who was admitted with bilateral pulmonary embolism.     In 2020 she was admitted to [place] with NSTEMI and was transferred to [facility] and underwent cardiac catheterization and drug-eluting stent to LAD.  She was started on clopidogrel and apixaban 2.5 mg twice daily.  An echocardiogram done at that time showed EF 29% with sevree distal and apical hypokinesis, moderate MR and moderate to severe TR. Her repeat ECHO in 2020 shoed improved EF of 61-65% with sever bi-atrial enlargement, mild to moderate AI, mild to moderate TR.    In 2020 she had fall and developed ICH and SAH and was taken off of apixaban and clopidogrel.  She was evaluated by neurosurgery and the plan was to reevaluate her before resuming any of her therapies.  She was then discharged back to her rehab facility.      She was seen at [facility] last month with confusion and anemia. She again was seen by neurosurgery who recommended to continue to hold anticoagulation.        She presented back to [facility] emergency room on 10/7/2020 with complaints of shortness of breath and was noted to be hypoxic with oxygen saturations in the  80s.  Work-up included a CT angiogram of the chest which showed evidence of bilateral pulmonary embolism and evidence of right ventricular strain.  She was transferred to [facility] for further evaluation.  Anticoagulation has not been started yet until neurosurgery evaluation.      The patient is a poor historian.  I saw her while she was down in the vascular lab.  She denies any shortness of breath currently but believes she was feeling out of breath yesterday.  She denies any chest pain, dizziness or lightheadedness, near-syncope or syncope.  She has a hard time remembering that she came from the nursing home.    Previous Cardiac Testing  ECHO 4/29/2020  · Estimated EF appears to be in the range of 61 - 65%  · Normal right ventricular systolic function noted. Right ventricular cavity is mildly dilated.  · Left atrial cavity size is severely dilated.  · Right atrial cavity size is severely dilated.  · Mild to moderate aortic valve regurgitation is present  · Mild to moderate tricuspid valve regurgitation is present  · Calculated right ventricular systolic pressure from tricuspid regurgitation is 25.0 mmHg.  · There is no evidence of pericardial effusion.    Cardiac Catheterization 1/18/2020          Past Medical History:   Diagnosis Date   • Arthritis of back    • Arthritis of neck    • Atrial fibrillation (CMS/HCC)    • Hip arthrosis    • Hyperlipidemia    • Hypertension    • Knee swelling    • Myocardial ischemia     inferior wall   • Stroke syndrome        Past Surgical History:   Procedure Laterality Date   • HIP SURGERY     • REPLACEMENT TOTAL KNEE Bilateral          Prior to Admission medications    Medication Sig Start Date End Date Taking? Authorizing Provider   acetaminophen (TYLENOL) 500 MG tablet Take 500 mg by mouth Every 6 (Six) Hours As Needed for Mild Pain  or Fever.   Yes Provider, MD Yaron   bisacodyl (DULCOLAX) 10 MG suppository Insert 10 mg into the rectum Daily As Needed for  Constipation.   Yes Yaron Barnes MD   carboxymethylcellulose (Artificial Tears) 1 % ophthalmic solution Administer 1 drop to both eyes 2 (Two) Times a Day As Needed.   Yes Yaron Barnes MD   ferrous sulfate 325 (65 FE) MG tablet Take 325 mg by mouth 2 (two) times a day.   Yes Yaron Barnes MD   lactulose (CHRONULAC) 10 GM/15ML solution Take 20 g by mouth Daily As Needed.   Yes Yaron Barnes MD   metoprolol tartrate (LOPRESSOR) 25 MG tablet Take 6.25 mg by mouth 2 (Two) Times a Day. 1/21/20  Yes Yaron Barnes MD   potassium chloride (K-DUR,KLOR-CON) 20 MEQ CR tablet Take 1 tablet by mouth 2 (Two) Times a Day. 2/24/20  Yes Gabriella Joshi APRN   senna (senna) 8.6 MG tablet Take 2 tablets by mouth Every Night.   Yes Yaron aBrnes MD   vitamin B-12 (CYANOCOBALAMIN) 500 MCG tablet Take 1 tablet by mouth Daily. 9/21/20  Yes Royal Centeno MD       No Known Allergies    Social History     Socioeconomic History   • Marital status:      Spouse name: Not on file   • Number of children: Not on file   • Years of education: Not on file   • Highest education level: Not on file   Tobacco Use   • Smoking status: Never Smoker   • Smokeless tobacco: Never Used   • Tobacco comment: caff use: 1 cup   Substance and Sexual Activity   • Alcohol use: No   • Drug use: No       Family History   Problem Relation Age of Onset   • Hypertension Mother    • Heart attack Father    • Heart attack Brother    • Hypertension Brother    • Stroke Brother    • Heart attack Daughter        REVIEW OF SYSTEMS:   All systems reviewed.  Pertinent positives identified in HPI.  All other systems are negative.      Objective:     Vitals:    10/07/20 2351 10/07/20 2352 10/08/20 0211   BP: 115/89     BP Location: Right arm     Patient Position: Lying     Pulse: 103     Resp: (!) 32     Temp: 97.8 °F (36.6 °C)     TempSrc: Oral     SpO2:  98%    Weight:   54.6 kg (120 lb 5.9 oz)   Height:   162.6 cm  "(64.02\")     Body mass index is 20.65 kg/m².    General Appearance:    Alert, cooperative, in no acute distress   Head:    Normocephalic, without obvious abnormality, atraumatic   Eyes:            Lids and lashes normal, conjunctivae and sclerae normal, no icterus, no pallor, corneas clear, PERRLA   Ears:    Ears appear intact with no abnormalities noted   Neck:   No adenopathy, supple, trachea midline, no thyromegaly, no carotid bruit, no JVD   Lungs:     Clear to auscultation, respirations regular, even and unlabored    Heart:    Irregularly, irregular, rhythm and normal rate, normal S1 and S2, no murmur, no gallop, no rub, no click   Chest Wall:    No abnormalities observed   Abdomen:     Normal bowel sounds, no masses, no organomegaly, soft, nontender, nondistended, no guarding, no rebound  tenderness   Extremities:   Moves all extremities well, no edema, no cyanosis, no redness   Pulses:   Pulses palpable and equal bilaterally. Normal radial, carotid, femoral, dorsalis pedis and posterior tibial pulses bilaterally. Normal abdominal aorta   Skin:  Psychiatric:   No bleeding, bruising or rash    Alert and oriented x 3, normal mood and affect   Lab Review:     Results from last 7 days   Lab Units 10/08/20  0446 10/07/20  0952   SODIUM mmol/L 143 142   POTASSIUM mmol/L 5.4* 4.3   CHLORIDE mmol/L 110* 105   CO2 mmol/L 21.6* 24.1   BUN mg/dL 31* 26*   CREATININE mg/dL 1.06* 0.94   CALCIUM mg/dL 8.2* 8.8   BILIRUBIN mg/dL  --  0.4   ALK PHOS U/L  --  157*   ALT (SGPT) U/L  --  18   AST (SGOT) U/L  --  38*   GLUCOSE mg/dL 94 118*     Results from last 7 days   Lab Units 10/07/20  0952   TROPONIN T ng/mL 0.043*     Results from last 7 days   Lab Units 10/08/20  0647   WBC 10*3/mm3 8.60  8.63   HEMOGLOBIN g/dL 8.9*  8.7*   HEMATOCRIT % 28.2*  27.2*   PLATELETS 10*3/mm3 200  200                         Telemetry      EKG this AM    EKG baseline      I personally viewed and interpreted the patient's EKG/Telemetry " data.        Assessment and Plan:       1.  Bilateral pulmonary embolism.  I reviewed her CT images which shows moderate amount of bilateral thrombus in the distal main pulmonary arteries.  No evidence of proximal thrombus or saddle embolus.  She does have evidence of right ventricular strain with an enlarged looking right ventricle on her CT scan.  Her troponin is also noted to be elevated.  She was hypoxic on admission but is currently fairly stable on 3 to 4 L of nasal cannula.  She has no dyspnea at rest and appears to be comfortable at this point.  She has not been started on anticoagulation just yet.  2.  Bilateral lower extremity DVT.  Discussed with the vascular tech reports bilateral lower extremity DVTs starting her calves up to her popliteal veins.  3.  Chronic atrial fibrillation.  Rates are fairly well controlled on a low-dose of metoprolol.  She was previously on apixaban that was held in August following intracranial bleed.  4.  Coronary artery disease.  Status post drug-eluting stent placement of her LAD in 1/2020.  She was previously on clopidogrel but this was stopped following her bleed in August.  5.  History of ischemic cardiomyopathy.  This resolved on a repeat echocardiogram in 4/2020.  6.  History of stroke  7.  Hypertension  8.  Hyperlipidemia    -She has moderate bilateral pulmonary embolism with evidence of right ventricular strain.  I think interventional pulmonary embolism would be difficult.  Both due to to her frailty and location of her thrombus.  Her thrombus is somewhat distal and may be difficult to treat effectively with thrombectomy.  Thrombectomy would also require her to be on anticoagulation at least during the procedure.  Unfortunately thrombectomy would be the only option for her because of her recent intracranial bleed contraindicates catheter directed lytic therapy.    -At this point I would recommend treating her conservatively with anticoagulation if okay with  neurosurgery.  I agree with considering an IVC filter but this would not ultimately treat her submassive pulmonary embolism and right ventricular strain.  It appears that both vascular surgery and neurosurgery have discussed this and the patient is to be started on heparin drip today.  -We will proceed with an echocardiogram to document her right ventricular size and function and also to reevaluate her left ventricular function.  -We will continue to monitor closely for any further deterioration.  However as long she remained stable I think conservative management with anticoagulation would be her best option.    Arpita March MD  10/08/20  10:45 EDT

## 2020-10-08 NOTE — CONSULTS
Patient Name: Gaby Diaz Account #: [id number]    MRN: [id number] Admission Date: 10/7/2020      Consulting Service: Vascular Surgery Date of Evaluation: October 8, 2020    Requesting Provider: Dr. Flakito Lyons MD    CHIEF COMPLAINT: Bilateral pulmonary emboli with remote intracranial bleed.  HPI: Gaby Diaz is a 89 y.o. female is being seen for a consultation and evaluation/management of bilateral pulmonary emboli with remote intracranial bleed.  She is over a month out from her prior intracranial subarachnoid hemorrhage likely driven by her Brilinta.  At this point in time no operations are planned from neurosurgery but we are awaiting clearance from them to start anticoagulation.  If they feel anticoagulation is possible will be our strong recommendation to pursue it.  Obviously filter placement may prevent further PE but will not treat the current submassive PE that she appears to have.  Will ask cardiology to evaluate to see if there is a percutaneous option to help with her LV RV ratio.  Currently we would obviously not opt for any lytics and without any significant swelling in her legs it is unclear that there is significant residual clot load.  We will await duplex scan to see what they are.  I will keep her n.p.o. and I have added her on for surgery this afternoon if a filter is needed but at this point time I would recommend against a filter if at all possible and plan for anticoagulation.    PAST MEDICAL HISTORY:   Past Medical History:   Diagnosis Date   • Arthritis of back    • Arthritis of neck    • Atrial fibrillation (CMS/HCC)    • Hip arthrosis    • Hyperlipidemia    • Hypertension    • Knee swelling    • Myocardial ischemia     inferior wall   • Stroke syndrome       PAST SURGICAL HISTORY:   Past Surgical History:   Procedure Laterality Date   • HIP SURGERY     • REPLACEMENT TOTAL KNEE Bilateral       FAMILY HISTORY:   Family History   Problem Relation Age of Onset   • Hypertension  Mother    • Heart attack Father    • Heart attack Brother    • Hypertension Brother    • Stroke Brother    • Heart attack Daughter       SOCIAL HISTORY:   Social History     Tobacco Use   • Smoking status: Never Smoker   • Smokeless tobacco: Never Used   • Tobacco comment: caff use: 1 cup   Substance Use Topics   • Alcohol use: No   • Drug use: No      MEDICATIONS:   Current Facility-Administered Medications on File Prior to Encounter   Medication Dose Route Frequency Provider Last Rate Last Dose   • [COMPLETED] iopamidol (ISOVUE-370) 76 % injection 100 mL  100 mL Intravenous Once in imaging Elieser Li MD   100 mL at 10/07/20 1232   • [DISCONTINUED] dextrose 5 % and sodium chloride 0.45 % infusion  100 mL/hr Intravenous Continuous Elieser Li MD   Stopped at 10/07/20 2243     Current Outpatient Medications on File Prior to Encounter   Medication Sig Dispense Refill   • acetaminophen (TYLENOL) 500 MG tablet Take 500 mg by mouth Every 6 (Six) Hours As Needed for Mild Pain  or Fever.     • bisacodyl (DULCOLAX) 10 MG suppository Insert 10 mg into the rectum Daily As Needed for Constipation.     • carboxymethylcellulose (Artificial Tears) 1 % ophthalmic solution Administer 1 drop to both eyes 2 (Two) Times a Day As Needed.     • ferrous sulfate 325 (65 FE) MG tablet Take 325 mg by mouth 2 (two) times a day.     • lactulose (CHRONULAC) 10 GM/15ML solution Take 20 g by mouth Daily As Needed.     • metoprolol tartrate (LOPRESSOR) 25 MG tablet Take 6.25 mg by mouth 2 (Two) Times a Day.     • potassium chloride (K-DUR,KLOR-CON) 20 MEQ CR tablet Take 1 tablet by mouth 2 (Two) Times a Day. 90 tablet 3   • senna (senna) 8.6 MG tablet Take 2 tablets by mouth Every Night.     • vitamin B-12 (CYANOCOBALAMIN) 500 MCG tablet Take 1 tablet by mouth Daily.               ALLERGIES: Patient has no known allergies.   COMPLETE REVIEW OF SYSTEMS:     ENT ROS: negative  Cardiovascular ROS: no chest pain or dyspnea on  "exertion  Respiratory ROS: no cough, positive for shortness of breath, no wheezing  Gastrointestinal ROS: no abdominal pain, change in bowel habits, or black or bloody stools  Neurological ROS: no TIA or stroke symptoms  Genito-Urinary ROS: no dysuria, trouble voiding, or hematuria  Musculoskeletal ROS: negative  Dermatological ROS: negative  Psychological ROS: negative      PHYSICAL EXAM:   Patient Vitals for the past 24 hrs:   BP Temp Temp src Pulse Resp SpO2 Height Weight   10/08/20 0211 -- -- -- -- -- -- 162.6 cm (64.02\") 54.6 kg (120 lb 5.9 oz)   10/07/20 2352 -- -- -- -- -- 98 % -- --   10/07/20 2351 115/89 97.8 °F (36.6 °C) Oral 103 (!) 32 -- -- --        General appearance: oriented to person, place, and time and chronically ill appearing.  Neurological exam reveals alert, oriented, normal speech, no focal findings or movement disorder noted.  ENT exam reveals - ENT exam normal, no neck nodes or sinus tenderness.  CVS exam: normal rate, regular rhythm, normal S1, S2, no murmurs, rubs, clicks or gallops.  Chest: Coarse but equal..  Abdominal exam: soft, nontender, nondistended, no masses or organomegaly.  Examination of the feet reveals warm, good capillary refill.  No real significant swelling in either leg.      LABS:      Results Review:       I reviewed the patient's new clinical results.  Results from last 7 days   Lab Units 10/08/20  0647 10/07/20  0952   WBC 10*3/mm3 8.63 11.78*   HEMOGLOBIN g/dL 8.7* 9.8*   PLATELETS 10*3/mm3 200 228     Results from last 7 days   Lab Units 10/08/20  0446 10/07/20  0952   SODIUM mmol/L 143 142   POTASSIUM mmol/L 5.4* 4.3   CHLORIDE mmol/L 110* 105   CO2 mmol/L 21.6* 24.1   BUN mg/dL 31* 26*   CREATININE mg/dL 1.06* 0.94   GLUCOSE mg/dL 94 118*   Estimated Creatinine Clearance: 31 mL/min (A) (by C-G formula based on SCr of 1.06 mg/dL (H)).  Results from last 7 days   Lab Units 10/08/20  0446 10/07/20  0952   CALCIUM mg/dL 8.2* 8.8   ALBUMIN g/dL  --  2.90*       "     The following radiologic or non-invasive studies have been reviewed by me: CT angiogram of the chest reviewed.  CT of the head reviewed.  A venous duplex pending    Active Hospital Problems    Diagnosis  POA   • **Pulmonary embolus (CMS/HCC) [I26.99]  Unknown   • Pulmonary emboli (CMS/HCC) [I26.99]  Yes   • DNR (do not resuscitate) [Z66]  Unknown   • Dementia without behavioral disturbance (CMS/HCC) [F03.90]  Unknown   • Pleural effusion on right [J90]  Unknown   • Anemia [D64.9]  Yes   • Atrial fibrillation (CMS/HCC) [I48.91]  Yes   • Essential hypertension [I10]  Yes   • Hyperlipidemia [E78.5]  Unknown      Resolved Hospital Problems   No resolved problems to display.         ASSESSMENT/PLAN: 89 y.o. female with submassive PE by CTA.  Recommend pulmonary and cardiac evaluation.  She does have an elevated elevated RV LV ratio.  Her legs appear to have no significant swelling.  We have been asked to place a filter to prevent further PE.  We are awaiting a stat duplex of the legs to see how much residual clot is really at risk.  More importantly she would benefit from anticoagulation given the amount of clot in her lungs and strain on her heart.  We would strongly recommend anticoagulation over filter placement if at all possible.  We are awaiting neurosurgery evaluation.  Please see my discussion above.  We will keep her n.p.o. and add her on for scheduled today if neurosurgery feels she cannot be safely anticoagulated.  Will await neurosurgery input.      I discussed the plan with the patient and she is agreeable to the plan of care at this point. Thank you for this consult.     Addendum:  Discussion with neurosurgery leasds me to believe trialing low dose heparin drip withou bolus intially is our safest answer   Will cancel filter and watch with you.      Max Omer MD   10/08/20

## 2020-10-08 NOTE — PROGRESS NOTES
Bilateral lower venous doppler complete and preliminary is positive for bilateral  Acute dvt to [facility]

## 2020-10-08 NOTE — ED NOTES
CALLED [facility].  DR ERICKSON TO RETURN CALL.     Narcisa Harrison  10/07/20 2029

## 2020-10-08 NOTE — ED NOTES
Bed board called again to inform them that the patient is to have Darren filter placed tomorrow and needs to be at there hospital for the procedure.  supposed to call me back      Xiomara Talbot RN  10/07/20 2020

## 2020-10-08 NOTE — ED NOTES
Received a call from Bed board at [facility], who informed me that no beds will be available tonight per  Erica. Primary nurse and M.D made aware.     Eloise Rossi RN  10/07/20 2017

## 2020-10-08 NOTE — PLAN OF CARE
Goal Outcome Evaluation:  Plan of Care Reviewed With: patient  Progress: improving  Outcome Summary: Pt VSS, controlled afib, heparin drip started today, bilateral lower doppler showed numerous clots in legs, echo revealed EF 63% with normal LV function, decided no IVC filter for now, dinaher and son at bedside at different times today, up in chair at shift change and tolerated well. will continue to monitor, safety maintained. CL

## 2020-10-08 NOTE — NURSING NOTE
Patient declined to have her chest port needle changed today.  Patient states that she is suppose to go home tomorrow and doesn't want it done today.  Patient nurse at the bedside for conversation.

## 2020-10-08 NOTE — ED NOTES
Call placed to [facility] Intens[facility]. They have been paged and will be calling back      Michaelle De Oliveira  10/07/20 2054

## 2020-10-09 ENCOUNTER — APPOINTMENT (OUTPATIENT)
Dept: CT IMAGING | Facility: HOSPITAL | Age: 85
End: 2020-10-09

## 2020-10-09 PROBLEM — E87.5 HYPERKALEMIA: Status: ACTIVE | Noted: 2020-10-09

## 2020-10-09 LAB
ANION GAP SERPL CALCULATED.3IONS-SCNC: 9.9 MMOL/L (ref 5–15)
APTT PPP: 122.9 SECONDS (ref 22.7–35.4)
APTT PPP: 60.3 SECONDS (ref 22.7–35.4)
BASOPHILS # BLD AUTO: 0.02 10*3/MM3 (ref 0–0.2)
BASOPHILS NFR BLD AUTO: 0.2 % (ref 0–1.5)
BUN SERPL-MCNC: 30 MG/DL (ref 8–23)
BUN/CREAT SERPL: 30.3 (ref 7–25)
CALCIUM SPEC-SCNC: 8.1 MG/DL (ref 8.6–10.5)
CHLORIDE SERPL-SCNC: 108 MMOL/L (ref 98–107)
CO2 SERPL-SCNC: 22.1 MMOL/L (ref 22–29)
CREAT SERPL-MCNC: 0.99 MG/DL (ref 0.57–1)
DEPRECATED RDW RBC AUTO: 51.7 FL (ref 37–54)
EOSINOPHIL # BLD AUTO: 0.02 10*3/MM3 (ref 0–0.4)
EOSINOPHIL NFR BLD AUTO: 0.2 % (ref 0.3–6.2)
ERYTHROCYTE [DISTWIDTH] IN BLOOD BY AUTOMATED COUNT: 15.3 % (ref 12.3–15.4)
GFR SERPL CREATININE-BSD FRML MDRD: 53 ML/MIN/1.73
GLUCOSE SERPL-MCNC: 84 MG/DL (ref 65–99)
HCT VFR BLD AUTO: 30.9 % (ref 34–46.6)
HGB BLD-MCNC: 9.5 G/DL (ref 12–15.9)
IMM GRANULOCYTES # BLD AUTO: 0.1 10*3/MM3 (ref 0–0.05)
IMM GRANULOCYTES NFR BLD AUTO: 1.1 % (ref 0–0.5)
LYMPHOCYTES # BLD AUTO: 1.66 10*3/MM3 (ref 0.7–3.1)
LYMPHOCYTES NFR BLD AUTO: 18.5 % (ref 19.6–45.3)
MCH RBC QN AUTO: 28.3 PG (ref 26.6–33)
MCHC RBC AUTO-ENTMCNC: 30.7 G/DL (ref 31.5–35.7)
MCV RBC AUTO: 92 FL (ref 79–97)
MONOCYTES # BLD AUTO: 1.02 10*3/MM3 (ref 0.1–0.9)
MONOCYTES NFR BLD AUTO: 11.4 % (ref 5–12)
NEUTROPHILS NFR BLD AUTO: 6.14 10*3/MM3 (ref 1.7–7)
NEUTROPHILS NFR BLD AUTO: 68.6 % (ref 42.7–76)
NRBC BLD AUTO-RTO: 0 /100 WBC (ref 0–0.2)
PLATELET # BLD AUTO: 239 10*3/MM3 (ref 140–450)
PMV BLD AUTO: 10.4 FL (ref 6–12)
POTASSIUM SERPL-SCNC: 4.3 MMOL/L (ref 3.5–5.2)
RBC # BLD AUTO: 3.36 10*6/MM3 (ref 3.77–5.28)
SODIUM SERPL-SCNC: 140 MMOL/L (ref 136–145)
WBC # BLD AUTO: 8.96 10*3/MM3 (ref 3.4–10.8)

## 2020-10-09 PROCEDURE — 25010000002 HEPARIN (PORCINE) PER 1000 UNITS: Performed by: SURGERY

## 2020-10-09 PROCEDURE — G0008 ADMIN INFLUENZA VIRUS VAC: HCPCS | Performed by: HOSPITALIST

## 2020-10-09 PROCEDURE — 99232 SBSQ HOSP IP/OBS MODERATE 35: CPT | Performed by: NEUROLOGICAL SURGERY

## 2020-10-09 PROCEDURE — 97162 PT EVAL MOD COMPLEX 30 MIN: CPT

## 2020-10-09 PROCEDURE — 90686 IIV4 VACC NO PRSV 0.5 ML IM: CPT | Performed by: HOSPITALIST

## 2020-10-09 PROCEDURE — 85730 THROMBOPLASTIN TIME PARTIAL: CPT | Performed by: INTERNAL MEDICINE

## 2020-10-09 PROCEDURE — 99232 SBSQ HOSP IP/OBS MODERATE 35: CPT | Performed by: INTERNAL MEDICINE

## 2020-10-09 PROCEDURE — 85025 COMPLETE CBC W/AUTO DIFF WBC: CPT | Performed by: SURGERY

## 2020-10-09 PROCEDURE — 25010000002 INFLUENZA VAC SPLIT QUAD 0.5 ML SUSPENSION PREFILLED SYRINGE: Performed by: HOSPITALIST

## 2020-10-09 PROCEDURE — 97110 THERAPEUTIC EXERCISES: CPT

## 2020-10-09 PROCEDURE — 70450 CT HEAD/BRAIN W/O DYE: CPT

## 2020-10-09 PROCEDURE — 80048 BASIC METABOLIC PNL TOTAL CA: CPT | Performed by: INTERNAL MEDICINE

## 2020-10-09 RX ORDER — CLOPIDOGREL BISULFATE 75 MG/1
75 TABLET ORAL DAILY
Status: DISCONTINUED | OUTPATIENT
Start: 2020-10-09 | End: 2020-10-15 | Stop reason: HOSPADM

## 2020-10-09 RX ADMIN — METOPROLOL TARTRATE 6.25 MG: 25 TABLET, FILM COATED ORAL at 20:09

## 2020-10-09 RX ADMIN — Medication 500 MCG: at 09:18

## 2020-10-09 RX ADMIN — SENNOSIDES 2 TABLET: 8.6 TABLET, FILM COATED ORAL at 20:09

## 2020-10-09 RX ADMIN — SODIUM CHLORIDE, PRESERVATIVE FREE 10 ML: 5 INJECTION INTRAVENOUS at 09:18

## 2020-10-09 RX ADMIN — HEPARIN SODIUM 1600 UNITS: 5000 INJECTION INTRAVENOUS; SUBCUTANEOUS at 17:53

## 2020-10-09 RX ADMIN — CLOPIDOGREL 75 MG: 75 TABLET, FILM COATED ORAL at 18:29

## 2020-10-09 RX ADMIN — METOPROLOL TARTRATE 6.25 MG: 25 TABLET, FILM COATED ORAL at 09:18

## 2020-10-09 RX ADMIN — INFLUENZA VIRUS VACCINE 0.5 ML: 15; 15; 15; 15 SUSPENSION INTRAMUSCULAR at 12:34

## 2020-10-09 RX ADMIN — SODIUM CHLORIDE, PRESERVATIVE FREE 10 ML: 5 INJECTION INTRAVENOUS at 20:10

## 2020-10-09 NOTE — PROGRESS NOTES
" LOS: 2 days     Name: Gaby Diaz  Age: 89 y.o.  Sex: female  :  2/10/1931  MRN: [id number]         Primary Care Physician: Arsh Ford MD    Subjective   Subjective  Patient has no complaints of chest pain or shortness of breath    Objective   Vital Signs  Temp:  [97.2 °F (36.2 °C)-98.3 °F (36.8 °C)] 98.3 °F (36.8 °C)  Heart Rate:  [] 82  Resp:  [18-20] 20  BP: (108-135)/(68-87) 135/87  Body mass index is 20.59 kg/m².    Objective:  General Appearance:  Comfortable and in no acute distress (Elderly and frail-appearing).    Vital signs: (most recent): Blood pressure 135/87, pulse 82, temperature 98.3 °F (36.8 °C), temperature source Oral, resp. rate 20, height 162.6 cm (64.02\"), weight 54.4 kg (120 lb), SpO2 96 %.    Lungs:  Normal effort and normal respiratory rate.  She is not in respiratory distress.  There are decreased breath sounds.    Heart: Normal rate.  Regular rhythm.    Abdomen: Abdomen is soft.  Bowel sounds are normal.   There is no abdominal tenderness.     Extremities: There is no dependent edema or local swelling.    Neurological: Patient is alert and oriented to person, place and time.    Skin:  Warm and dry.              Results Review:       I reviewed the patient's new clinical results.    Results from last 7 days   Lab Units 10/09/20  0550 10/08/20  0647 10/07/20  0952   WBC 10*3/mm3 8.96 8.60  8.63 11.78*   HEMOGLOBIN g/dL 9.5* 8.9*  8.7* 9.8*   PLATELETS 10*3/mm3 239 200  200 228     Results from last 7 days   Lab Units 10/08/20  0446 10/07/20  0952   SODIUM mmol/L 143 142   POTASSIUM mmol/L 5.4* 4.3   CHLORIDE mmol/L 110* 105   CO2 mmol/L 21.6* 24.1   BUN mg/dL 31* 26*   CREATININE mg/dL 1.06* 0.94   CALCIUM mg/dL 8.2* 8.8   GLUCOSE mg/dL 94 118*     Results from last 7 days   Lab Units 10/08/20  1144   INR  1.06       Scheduled Meds:   metoprolol tartrate, 6.25 mg, Oral, BID  senna, 2 tablet, Oral, Nightly  sodium chloride, 10 mL, Intravenous, Q12H  vitamin B-12, 500 " mcg, Oral, Daily      PRN Meds:   •  heparin (porcine)  •  lactulose  •  nitroglycerin  •  ondansetron  •  sodium chloride  Continuous Infusions:  heparin (porcine), 12 Units/kg/hr, Last Rate: 12 Units/kg/hr (10/09/20 0922)        Assessment/Plan   Active Hospital Problems    Diagnosis  POA   • **Pulmonary embolus (CMS/HCC) [I26.99]  Unknown   • Hyperkalemia [E87.5]  Unknown   • Pulmonary emboli (CMS/HCC) [I26.99]  Yes   • DNR (do not resuscitate) [Z66]  Unknown   • Dementia without behavioral disturbance (CMS/HCC) [F03.90]  Unknown   • Pleural effusion on right [J90]  Unknown   • Anemia [D64.9]  Yes   • Atrial fibrillation (CMS/HCC) [I48.91]  Yes   • Essential hypertension [I10]  Yes   • Hyperlipidemia [E78.5]  Unknown      Resolved Hospital Problems   No resolved problems to display.       Assessment & Plan    -Repeat head CT this morning was stable and neurosurgery recommends ongoing anticoagulation.  No plans for additional head imaging at this time and they have signed off.  -Cardiology also following and have determined that PEs are not amenable to intervention.  Heart rate and blood pressure are stable.  Plans noted to restart Plavix soon.  -Given the fact that decision was made in favor of anticoagulation she will not go for IVC filter.  Appreciate vascular surgery input.  -Mild hyperkalemia noted on lab from yesterday.  No BMP today and will repeat to see where she stands  -We will begin to mobilize her with PT        I wore full protective equipment throughout the patient encounter including eye protection and facemask.  Hand hygiene was performed before donning protective equipment and after removal when leaving the room.    Alexis Wisdom MD  [facility]  10/09/20  13:57 EDT

## 2020-10-09 NOTE — PROGRESS NOTES
NEUROSURGERY PROGRESS NOTE     LOS: 2 days   Patient Care Team:  Arsh Ford MD as PCP - General  Arsh Ford MD as PCP - Family Medicine    Chief Complaint: Shortness of breath    Subjective     Interval History:     Hospital day 3 for pulmonary embolus.  Patient had no neurologic events overnight.  She was arousable this morning and able to follow commands and answer simple questions.  She is questioning whether she can go home today or not.  CT scan of the head was done at our request early this morning for routine follow-up after initiation of anticoagulation.    While in the room and during my examination of the patient I wore a mask and eye protection.  I washed my hands before and after this patient encounter.  The patient was also wearing a mask.     History taken from: patient chart    Objective      Vital Signs  Temp:  [97.2 °F (36.2 °C)-98.3 °F (36.8 °C)] 98.3 °F (36.8 °C)  Heart Rate:  [] 82  Resp:  [18-20] 20  BP: (101-135)/(57-87) 135/87  Body mass index is 20.59 kg/m².    Intake/Output last 3 shifts:  I/O last 3 completed shifts:  In: 840 [P.O.:840]  Out: -     Intake/Output this shift:  No intake/output data recorded.    Physical    Patient oriented to person place and time  Easily arousable to voice  Motor exam symmetrical in the upper and lower extremities with no ulnar drift  Pupils are equally round and reactive to light  Extraocular movements are intact  Speech is fluent  She is compliant with her nasal cannula    Results Review:     I reviewed the patient's new clinical results.    Labs:    Lab Results (last 24 hours)     Procedure Component Value Units Date/Time    Protime-INR [[id number]]  (Normal) Collected: 10/08/20 1144    Specimen: Blood from Arm, Right Updated: 10/08/20 1240     Protime 13.7 Seconds      INR 1.06    aPTT [[id number]]  (Normal) Collected: 10/08/20 1144    Specimen: Blood from Arm, Right Updated: 10/08/20 1240     PTT 30.9 seconds     aPTT [[id number]]   (Abnormal) Collected: 10/08/20 1801    Specimen: Blood from Arm, Right Updated: 10/08/20 1827     PTT 44.9 seconds     aPTT [[id number]]  (Abnormal) Collected: 10/09/20 0550    Specimen: Blood Updated: 10/09/20 0623     .9 seconds     CBC & Differential [[id number]]  (Abnormal) Collected: 10/09/20 0550    Specimen: Blood Updated: 10/09/20 0603    Narrative:      The following orders were created for panel order CBC & Differential.  Procedure                               Abnormality         Status                     ---------                               -----------         ------                     CBC Auto Differential[[id number]]        Abnormal            Final result                 Please view results for these tests on the individual orders.    CBC Auto Differential [[id number]]  (Abnormal) Collected: 10/09/20 0550    Specimen: Blood Updated: 10/09/20 0603     WBC 8.96 10*3/mm3      RBC 3.36 10*6/mm3      Hemoglobin 9.5 g/dL      Hematocrit 30.9 %      MCV 92.0 fL      MCH 28.3 pg      MCHC 30.7 g/dL      RDW 15.3 %      RDW-SD 51.7 fl      MPV 10.4 fL      Platelets 239 10*3/mm3      Neutrophil % 68.6 %      Lymphocyte % 18.5 %      Monocyte % 11.4 %      Eosinophil % 0.2 %      Basophil % 0.2 %      Immature Grans % 1.1 %      Neutrophils, Absolute 6.14 10*3/mm3      Lymphocytes, Absolute 1.66 10*3/mm3      Monocytes, Absolute 1.02 10*3/mm3      Eosinophils, Absolute 0.02 10*3/mm3      Basophils, Absolute 0.02 10*3/mm3      Immature Grans, Absolute 0.10 10*3/mm3      nRBC 0.0 /100 WBC           Imaging:    I personally reviewed the images from the following radiographic studies.    CT scan of the head done this a.m. reveals trace intraventricular blood stable/improved from prior admission.  No new hemorrhage since the initiation of anticoagulation.    Current Medications:   Scheduled Meds:influenza vaccine, 0.5 mL, Intramuscular, Once  metoprolol tartrate, 6.25 mg, Oral, BID  senna, 2 tablet, Oral,  Nightly  sodium chloride, 10 mL, Intravenous, Q12H  vitamin B-12, 500 mcg, Oral, Daily      Continuous Infusions:heparin (porcine), 12 Units/kg/hr, Last Rate: Stopped (10/09/20 0632)        Assessment/Plan       Pulmonary embolus (CMS/HCC)    Atrial fibrillation (CMS/HCC)    Hyperlipidemia    Essential hypertension    Anemia    DNR (do not resuscitate)    Dementia without behavioral disturbance (CMS/HCC)    Pleural effusion on right    Pulmonary emboli (CMS/HCC)      Plan: Patient is 6 weeks status post intraventricular hemorrhage with gradual improvement.  Subsequently patient has developed pulmonary embolus superimposed upon the original reason why she was anticoagulated which is atrial fibrillation.  Therefore, she now has 2 life threatening reasons to continue anticoagulation versus the risk of cerebral hemorrhage.  No need for further CT head imaging or specific neurosurgical follow-up unless questions or concerns develop.  We will sign off at this time.    Cesar Romero MD  10/09/20  08:16 EDT

## 2020-10-09 NOTE — THERAPY EVALUATION
Patient Name: Gaby Diaz  : 2/10/1931    MRN: [id number]                              Today's Date: 10/9/2020       Admit Date: 10/7/2020    Visit Dx:     ICD-10-CM ICD-9-CM   1. Acute saddle pulmonary embolism with acute cor pulmonale (CMS/HCC)  I26.02 415.13     415.0     Patient Active Problem List   Diagnosis   • Atrial fibrillation (CMS/HCC)   • Hyperlipidemia   • Essential hypertension   • Intraventricular hemorrhage (CMS/HCC)   • Fall -    • Anemia   • Pulmonary embolus (CMS/HCC)   • DNR (do not resuscitate)   • Dementia without behavioral disturbance (CMS/HCC)   • Pleural effusion on right   • Pulmonary emboli (CMS/HCC)   • Hyperkalemia     Past Medical History:   Diagnosis Date   • Arthritis of back    • Arthritis of neck    • Atrial fibrillation (CMS/HCC)    • Hip arthrosis    • Hyperlipidemia    • Hypertension    • Knee swelling    • Myocardial ischemia     inferior wall   • Stroke syndrome      Past Surgical History:   Procedure Laterality Date   • HIP SURGERY     • REPLACEMENT TOTAL KNEE Bilateral      General Information     Row Name 10/09/20 1538          Physical Therapy Time and Intention    Document Type  evaluation  -AR     Mode of Treatment  physical therapy  -AR     Row Name 10/09/20 1538          General Information    Patient Profile Reviewed  yes  -AR     Prior Level of Function  min assist: admit from SNU with +PE, DVTs.  normally lives at home alone, has been at SNu for 2-3 wks per pt report  -AR     Existing Precautions/Restrictions  fall  -AR     Barriers to Rehab  none identified  -AR     Row Name 10/09/20 1538          Living Environment    Lives With  -- admit from U, normally at home alone  -AR     Row Name 10/09/20 1538          Cognition    Orientation Status (Cognition)  oriented to;person;place  -AR     Row Name 10/09/20 1538          Safety Issues, Functional Mobility    Impairments Affecting Function (Mobility)  balance;endurance/activity tolerance;shortness  of breath;strength  -AR       User Key  (r) = Recorded By, (t) = Taken By, (c) = Cosigned By    Initials Name Provider Type    AR Kristan Wiggins, PT Physical Therapist        Mobility     Row Name 10/09/20 1539          Bed Mobility    Bed Mobility  sit-supine;supine-sit  -AR     Supine-Sit [place] (Bed Mobility)  minimum assist (75% patient effort)  -AR     Sit-Supine [place] (Bed Mobility)  not tested  -AR     Assistive Device (Bed Mobility)  bed rails;head of bed elevated  -AR     Comment (Bed Mobility)  VC for sequencing, sitting rest break once up due to SOB, Sp02 >93%  -AR     Row Name 10/09/20 1539          Sit-Stand Transfer    Sit-Stand [place] (Transfers)  minimum assist (75% patient effort)  -AR     Assistive Device (Sit-Stand Transfers)  walker, front-wheeled  -AR     Row Name 10/09/20 1539          Gait/Stairs (Locomotion)    [place] Level (Gait)  minimum assist (75% patient effort)  -AR     Assistive Device (Gait)  walker, front-wheeled  -AR     Distance in Feet (Gait)  15' cues for posture, sa fety with RW, limited by fatigue/weakness and SOA, SP02 at 100% once able to get reading  -AR     Deviations/Abnormal Patterns (Gait)  festinating/shuffling;misael decreased  -AR     Bilateral Gait Deviations  heel strike decreased;forward flexed posture  -AR       User Key  (r) = Recorded By, (t) = Taken By, (c) = Cosigned By    Initials Name Provider Type    AR Kristan Wiggins, PT Physical Therapist        Obj/Interventions     Row Name 10/09/20 1540          Range of Motion Comprehensive    Comment, General Range of Motion  B LE WFL  -AR     Row Name 10/09/20 1540          Strength Comprehensive (MMT)    Comment, General Manual Muscle Testing (MMT) Assessment  B LE -4/5  -AR     Row Name 10/09/20 1540          Motor Skills    Therapeutic Exercise  -- B AP, LAQ 10x  -AR     Row Name 10/09/20 1540          Balance    Balance Assessment  sitting static balance;standing static balance  -AR      Static Sitting Balance  mild impairment  -AR     Static Standing Balance  mild impairment  -AR       User Key  (r) = Recorded By, (t) = Taken By, (c) = Cosigned By    Initials Name Provider Type    Kristan Breen, PT Physical Therapist        Goals/Plan     Row Name 10/09/20 1545          Bed Mobility Goal 1 (PT)    Activity/Assistive Device (Bed Mobility Goal 1, PT)  bed mobility activities, all  -AR     [place] Level/Cues Needed (Bed Mobility Goal 1, PT)  supervision required  -AR     Time Frame (Bed Mobility Goal 1, PT)  1 week  -AR     Row Name 10/09/20 1543          Transfer Goal 1 (PT)    Activity/Assistive Device (Transfer Goal 1, PT)  sit-to-stand/stand-to-sit;walker, rolling  -AR     [place] Level/Cues Needed (Transfer Goal 1, PT)  standby assist  -AR     Time Frame (Transfer Goal 1, PT)  1 week  -AR     Row Name 10/09/20 1544          Gait Training Goal 1 (PT)    Activity/Assistive Device (Gait Training Goal 1, PT)  gait (walking locomotion);walker, rolling  -AR     [place] Level (Gait Training Goal 1, PT)  contact guard assist  -AR     Distance (Gait Training Goal 1, PT)  150  -AR     Time Frame (Gait Training Goal 1, PT)  1 week  -AR       User Key  (r) = Recorded By, (t) = Taken By, (c) = Cosigned By    Initials Name Provider Type    Kristan Breen, PT Physical Therapist        Clinical Impression     Row Name 10/09/20 1544          Pain    Additional Documentation  Pain Scale: Numbers Pre/Post-Treatment (Group)  -AR     Row Name 10/09/20 1549          Pain Scale: Numbers Pre/Post-Treatment    Pretreatment Pain Rating  0/10 - no pain  -AR     Posttreatment Pain Rating  0/10 - no pain  -AR     Pain Intervention(s)  Repositioned  -AR     Row Name 10/09/20 1646          Plan of Care Review    Plan of Care Reviewed With  patient  -AR     Outcome Summary  Pt admitted from SNU with +PE, DVTs.  Pt reports being at rehab for 2-3 weeks, but normally she lives alone, uses rollator.   Today pt demonstates generalized weakness, impaired gait pattern and ind. w/ mobility but able to ambulate 15' w/ min A using RW.  Limited by SOA and weakness, SP02 maintained >94%.  Recommend DC back to SNU.  -AR     Row Name 10/09/20 1543          Therapy Assessment/Plan (PT)    Rehab Potential (PT)  good, to achieve stated therapy goals  -AR     Criteria for Skilled Interventions Met (PT)  yes  -AR     Row Name 10/09/20 1543          Vital Signs    Pre SpO2 (%)  95  -AR     O2 Delivery Pre Treatment  supplemental O2  -AR     Intra SpO2 (%)  100 ~ 1 min after walk, once able to get reading  -AR     O2 Delivery Intra Treatment  supplemental O2  -AR     Post SpO2 (%)  97  -AR     O2 Delivery Post Treatment  supplemental O2  -AR     Row Name 10/09/20 1543          Positioning and Restraints    Pre-Treatment Position  in bed  -AR     Post Treatment Position  chair  -AR     In Chair  notified nsg;reclined;sitting;call light within reach;encouraged to call for assist;exit alarm on  -AR       User Key  (r) = Recorded By, (t) = Taken By, (c) = Cosigned By    Initials Name Provider Type    AR Kristan Wiggins, PT Physical Therapist        Outcome Measures     Row Name 10/09/20 1547          How much help from another person do you currently need...    Turning from your back to your side while in flat bed without using bedrails?  3  -AR     Moving from lying on back to sitting on the side of a flat bed without bedrails?  3  -AR     Moving to and from a bed to a chair (including a wheelchair)?  3  -AR     Standing up from a chair using your arms (e.g., wheelchair, bedside chair)?  3  -AR     Climbing 3-5 steps with a railing?  2  -AR     To walk in hospital room?  3  -AR     AM-PAC 6 Clicks Score (PT)  17  -AR     Row Name 10/09/20 1547          Functional Assessment    Outcome Measure Options  AM-PAC 6 Clicks Basic Mobility (PT)  -AR       User Key  (r) = Recorded By, (t) = Taken By, (c) = Cosigned By    Initials Name  Provider Type    AR Kristan Wiggins PT Physical Therapist        Physical Therapy Education                 Title: PT OT SLP Therapies (Not Started)     Topic: Physical Therapy (In Progress)     Point: Mobility training (In Progress)     Learning Progress Summary           Patient Acceptance, E, NR by AR at 10/9/2020 1547                   Point: Home exercise program (In Progress)     Learning Progress Summary           Patient Acceptance, E, NR by AR at 10/9/2020 1547                   Point: Body mechanics (In Progress)     Learning Progress Summary           Patient Acceptance, E, NR by AR at 10/9/2020 1547                   Point: Precautions (In Progress)     Learning Progress Summary           Patient Acceptance, E, NR by AR at 10/9/2020 1547                               User Key     Initials Effective Dates Name Provider Type Discipline    AR 04/03/18 -  Kristan Wiggins PT Physical Therapist PT              PT Recommendation and Plan  Planned Therapy Interventions (PT): balance training, bed mobility training, gait training, home exercise program, patient/family education, transfer training, ROM (range of motion), stair training, strengthening  Plan of Care Reviewed With: patient  Outcome Summary: Pt admitted from SNU with +PE, DVTs.  Pt reports being at rehab for 2-3 weeks, but normally she lives alone, uses rollator.  Today pt demonstates generalized weakness, impaired gait pattern and ind. w/ mobility but able to ambulate 15' w/ min A using RW.  Limited by SOA and weakness, SP02 maintained >94%.  Recommend DC back to SNU.     Time Calculation:   PT Charges     Row Name 10/09/20 1530             Time Calculation    Start Time  1446  -AR      Stop Time  1505  -AR      Time Calculation (min)  19 min  -AR      PT Received On  10/09/20  -AR      PT - Next Appointment  10/12/20  -AR      PT Goal Re-Cert Due Date  10/16/20  -AR        User Key  (r) = Recorded By, (t) = Taken By, (c) = Cosigned By     Initials Name Provider Type    AR Kristan Wiggins, PT Physical Therapist        Therapy Charges for Today     Code Description Service Date Service Provider Modifiers Qty    62750885441 HC PT EVAL MOD COMPLEXITY 2 10/9/2020 Kristan Wiggins, PT GP 1    21179663314 HC PT THER PROC EA 15 MIN 10/9/2020 Kristan Wiggins, PT GP 1          PT G-Codes  Outcome Measure Options: AM-PAC 6 Clicks Basic Mobility (PT)  AM-PAC 6 Clicks Score (PT): 17    Kristan Wiggins PT  10/9/2020

## 2020-10-09 NOTE — PLAN OF CARE
Goal Outcome Evaluation:  Plan of Care Reviewed With: patient  Progress: improving  Outcome Summary: Pt continues on heparin gtt. No c/o pain this shift. Remains on 4L O2. Safety maintained.

## 2020-10-09 NOTE — PLAN OF CARE
Goal Outcome Evaluation:  Plan of Care Reviewed With: patient  Progress: improving  Outcome Summary: pt vss and afebrile during shift.  pt on 3L O2, pt has PT consult.  Pt on heparin drip; PTT 60.2 was given 1600 unit bolus and rate increased by 1 unit.  pt up in chair for 3 hours of shift and at dinner in chair.  pt confused at time to place and time and person's in the room.  pt ambulated to bathroom w/walker, gait belt and assist x1; pt did state she was winded after using bathroom.  pt ambulated w/PT around bed to chair.  pt ambulated from chair to bed.  pt had no acute issues.  pt safety maintained and will continue to monitor.

## 2020-10-09 NOTE — PROGRESS NOTES
Name: Gaby Diaz ADMIT: 10/7/2020   : 2/10/1931  PCP: Arsh Ford MD    MRN: [id number] LOS: 2 days   AGE/SEX: 89 y.o. female  ROOM: [address]    [place], Subsequent Hospital Care    No chief complaint on file.    CC: DVT with PE  Subjective     89 y.o. female with bilateral popliteal DVTs based on duplex scan and extensive PE that is not amenable to percutaneous thrombectomy.  At this point time anticoagulation is her only real option in my opinion and placement of filter should only be pursued if she absolutely cannot tolerate anticoagulation.  All doctors agree with this course and she has been started on heparin drip.  She is tolerating this well and we recommend transitioning to Eliquis.  I believe Eliquis as the best safety profile of all the current medications available.    Review of Systems mild shortness of air  Objective     Scheduled Medications:   clopidogrel, 75 mg, Oral, Daily  metoprolol tartrate, 6.25 mg, Oral, BID  senna, 2 tablet, Oral, Nightly  sodium chloride, 10 mL, Intravenous, Q12H  vitamin B-12, 500 mcg, Oral, Daily        Active Infusions:  heparin (porcine), 12 Units/kg/hr, Last Rate: 12 Units/kg/hr (10/09/20 09)        As Needed Medications:  heparin (porcine)  •  lactulose  •  nitroglycerin  •  ondansetron  •  sodium chloride    Vital Signs  Vital Signs Patient Vitals for the past 24 hrs:   BP Temp Temp src Pulse Resp SpO2   10/09/20 1431 107/72 97.5 °F (36.4 °C) Oral 101 20 96 %   10/09/20 0735 135/87 98.3 °F (36.8 °C) Oral 82 20 96 %   10/09/20 0012 123/83 97.5 °F (36.4 °C) Oral 102 18 90 %   10/08/20 2001 -- -- -- 89 -- --   10/08/20 1947 108/68 97.2 °F (36.2 °C) Oral 85 18 --     Vital Signs (range)  Temp:  [97.2 °F (36.2 °C)-98.3 °F (36.8 °C)] 97.5 °F (36.4 °C)  Heart Rate:  [] 101  Resp:  [18-20] 20  BP: (107-135)/(68-87) 107/72  I/O:  I/O last 3 completed shifts:  In: 840 [P.O.:840]  Out: -   I/O:   Intake/Output Summary (Last 24 hours)  at 10/9/2020 1610  Last data filed at 10/8/2020 1703  Gross per 24 hour   Intake 240 ml   Output --   Net 240 ml     BMI:  Body mass index is 20.59 kg/m².    Physical Exam:  Physical Exam     Results Review:     CBC    Results from last 7 days   Lab Units 10/09/20  0550 10/08/20  0647 10/07/20  0952   WBC 10*3/mm3 8.96 8.60  8.63 11.78*   HEMOGLOBIN g/dL 9.5* 8.9*  8.7* 9.8*   PLATELETS 10*3/mm3 239 200  200 228     BMP   Results from last 7 days   Lab Units 10/09/20  1426 10/08/20  0446 10/07/20  0952   SODIUM mmol/L 140 143 142   POTASSIUM mmol/L 4.3 5.4* 4.3   CHLORIDE mmol/L 108* 110* 105   CO2 mmol/L 22.1 21.6* 24.1   BUN mg/dL 30* 31* 26*   CREATININE mg/dL 0.99 1.06* 0.94   GLUCOSE mg/dL 84 94 118*     Cr Clearance Estimated Creatinine Clearance: 33.1 mL/min (by C-G formula based on SCr of 0.99 mg/dL).  Coag   Results from last 7 days   Lab Units 10/09/20  1323 10/09/20  0550 10/08/20  1801 10/08/20  1144   INR   --   --   --  1.06   APTT seconds 60.3* 122.9* 44.9* 30.9     Radiology(recent) Ct Head Without Contrast    Result Date: 10/9/2020  No significant interval change in a trace amount of intraventricular hemorrhage layering within the posterior horn of the lateral ventricles. No new hemorrhage is seen.  Radiation dose reduction techniques were utilized, including automated exposure control and exposure modulation based on body size.  This report was finalized on 10/9/2020 5:23 AM by Dr. Belia Ballard M.D.      Ct Head Without Contrast    Result Date: 10/8/2020  Persistent acute appearing intraventricular hemorrhage layering within the posterior horns of the lateral ventricles bilaterally. Volume has decreased when compared to prior exam. No new areas of hemorrhage are seen.  Radiation dose reduction techniques were utilized, including automated exposure control and exposure modulation based on body size.  This report was finalized on 10/8/2020 5:58 AM by Dr. Belia Ballard M.D.         Assessment/Plan     Assessment & Plan      Pulmonary embolus (CMS/HCC)    Atrial fibrillation (CMS/HCC)    Hyperlipidemia    Essential hypertension    Anemia    DNR (do not resuscitate)    Dementia without behavioral disturbance (CMS/HCC)    Pleural effusion on right    Pulmonary emboli (CMS/HCC)    Hyperkalemia      89 y.o. female with bilateral popliteal DVTs and bilateral pulmonary emboli.  Anticoagulation recommended.  The length and duration of the heparin drip started will be deferred to the admitting service.  I would recommend when she is felt to be stable to transition her to Eliquis.  She will need full dose Eliquis 5 mg p.o. twice daily to treat her current DVT issue.  At this point in time we will plan a 6 to 8 weeks surveillance duplex to see how her clots are doing and how she is tolerating her therapy.  We will set this up in the office.  We will sign off for now.  Please call if there is further issues.  I will defer the timing of initiation of Eliquis to the medical team.      Personal protective equipment used for this patient encounter:  Patient wearing surgical mask []    Provider wearing a surgical mask [x]    Gloves [x]    Eye protection [x]    Face Shield []    Gown []    N 95 respirator or CAPR/PAPR []   Duration of interaction 10    Max Omer MD  10/09/20  16:07 EDT    Please call my office with any question: [phone]    Active Hospital Problems    Diagnosis  POA   • **Pulmonary embolus (CMS/HCC) [I26.99]  Unknown   • Hyperkalemia [E87.5]  Unknown   • Pulmonary emboli (CMS/HCC) [I26.99]  Yes   • DNR (do not resuscitate) [Z66]  Unknown   • Dementia without behavioral disturbance (CMS/HCC) [F03.90]  Unknown   • Pleural effusion on right [J90]  Unknown   • Anemia [D64.9]  Yes   • Atrial fibrillation (CMS/HCC) [I48.91]  Yes   • Essential hypertension [I10]  Yes   • Hyperlipidemia [E78.5]  Unknown      Resolved Hospital Problems   No resolved problems to display.

## 2020-10-09 NOTE — PROGRESS NOTES
"    Patient Name: Gaby Diaz  :2/10/1931  89 y.o.      Patient Care Team:  Arsh Ford MD as PCP - General  Arsh Ford MD as PCP - Family Medicine    Chief Complaint: PE, AF    Interval History:    tolerating heparin, no signs of bleeding. No CP or dyspnea    Objective   Vital Signs  Temp:  [97.2 °F (36.2 °C)-98.3 °F (36.8 °C)] 98.3 °F (36.8 °C)  Heart Rate:  [] 82  Resp:  [18-20] 20  BP: (101-135)/(57-87) 135/87    Intake/Output Summary (Last 24 hours) at 10/9/2020 1105  Last data filed at 10/8/2020 1703  Gross per 24 hour   Intake 600 ml   Output --   Net 600 ml     Flowsheet Rows      First Filed Value   Admission Height  162.6 cm (64.02\") Documented at 10/08/2020 0211   Admission Weight  54.6 kg (120 lb 5.9 oz) Documented at 10/08/2020 021          Physical Exam:   General Appearance:    Alert, cooperative, in no acute distress. edlerly   Lungs:     Clear to auscultation.  Normal respiratory effort and rate.      Heart:    Regular rhythm and normal rate, normal S1 and S2, no murmurs, gallops or rubs.     Chest Wall:    No abnormalities observed   Abdomen:     Soft, nontender, positive bowel sounds.     Extremities:   no cyanosis, clubbing or edema.  No marked joint deformities.  Adequate musculoskeletal strength.       Results Review:    Results from last 7 days   Lab Units 10/08/20  0446   SODIUM mmol/L 143   POTASSIUM mmol/L 5.4*   CHLORIDE mmol/L 110*   CO2 mmol/L 21.6*   BUN mg/dL 31*   CREATININE mg/dL 1.06*   GLUCOSE mg/dL 94   CALCIUM mg/dL 8.2*     Results from last 7 days   Lab Units 10/07/20  0952   TROPONIN T ng/mL 0.043*     Results from last 7 days   Lab Units 10/09/20  0550   WBC 10*3/mm3 8.96   HEMOGLOBIN g/dL 9.5*   HEMATOCRIT % 30.9*   PLATELETS 10*3/mm3 239     Results from last 7 days   Lab Units 10/09/20  0550 10/08/20  1801 10/08/20  1144   INR   --   --  1.06   APTT seconds 122.9* 44.9* 30.9                       Medication Review:   influenza vaccine, 0.5 mL, " Intramuscular, Once  metoprolol tartrate, 6.25 mg, Oral, BID  senna, 2 tablet, Oral, Nightly  sodium chloride, 10 mL, Intravenous, Q12H  vitamin B-12, 500 mcg, Oral, Daily         heparin (porcine), 12 Units/kg/hr, Last Rate: 12 Units/kg/hr (10/09/20 0922)        Assessment/Plan   1. B/l PE  2. B/l LE DVT  3. Permanent AF  4. CAD  5. Ischemic CM  6. CVA/ recent intravascular hemmhorage    Continue IV heparin and plan to transition to oral med. She was previously on Eliquis. PE's are not amenable anatomically to catheter based aspiration. There is RV dilation and decreased function as long as mild PH as a result of these PE.  Rates are controlled on a very low dose of metorolol 6.25 BID- continue.   Ideally she would be back on Plavix for her recent PCI in the setting of NSTEMI- appreciate NSG input on whether it is safe to restart this as well. Would not require ASA given she will be on Eliquis.       Mary Cueva MD  [facility]  10/09/20  11:05 EDT

## 2020-10-09 NOTE — PLAN OF CARE
Problem: Adult Inpatient Plan of Care  Goal: Plan of Care Review  Recent Flowsheet Documentation  Taken 10/9/2020 1543 by Kristan Wiggins PT  Plan of Care Reviewed With: patient  Outcome Summary: Pt admitted from SNU with +PE, DVTs.  Pt reports being at rehab for 2-3 weeks, but normally she lives alone, uses rollator.  Today pt demonstates generalized weakness, impaired gait pattern and ind. w/ mobility but able to ambulate 15' w/ min A using RW.  Limited by SOA and weakness, SP02 maintained >94%.  Recommend DC back to SNU.       ..Patient was intermittently wearing a face mask during this therapy encounter. Therapist used appropriate personal protective equipment including eye protection, mask, and gloves.  Mask used was standard procedure mask. Appropriate PPE was worn during the entire therapy session. Hand hygiene was completed before and after therapy session. Patient is not in enhanced droplet precautions.

## 2020-10-10 LAB
ANION GAP SERPL CALCULATED.3IONS-SCNC: 8.4 MMOL/L (ref 5–15)
APTT PPP: 88.9 SECONDS (ref 22.7–35.4)
APTT PPP: 94.3 SECONDS (ref 22.7–35.4)
BASOPHILS # BLD AUTO: 0.02 10*3/MM3 (ref 0–0.2)
BASOPHILS NFR BLD AUTO: 0.3 % (ref 0–1.5)
BUN SERPL-MCNC: 30 MG/DL (ref 8–23)
BUN/CREAT SERPL: 33 (ref 7–25)
CALCIUM SPEC-SCNC: 7.8 MG/DL (ref 8.6–10.5)
CHLORIDE SERPL-SCNC: 109 MMOL/L (ref 98–107)
CO2 SERPL-SCNC: 22.6 MMOL/L (ref 22–29)
CREAT SERPL-MCNC: 0.91 MG/DL (ref 0.57–1)
DEPRECATED RDW RBC AUTO: 50.9 FL (ref 37–54)
EOSINOPHIL # BLD AUTO: 0.1 10*3/MM3 (ref 0–0.4)
EOSINOPHIL NFR BLD AUTO: 1.5 % (ref 0.3–6.2)
ERYTHROCYTE [DISTWIDTH] IN BLOOD BY AUTOMATED COUNT: 15.1 % (ref 12.3–15.4)
GFR SERPL CREATININE-BSD FRML MDRD: 58 ML/MIN/1.73
GLUCOSE SERPL-MCNC: 77 MG/DL (ref 65–99)
HCT VFR BLD AUTO: 28.5 % (ref 34–46.6)
HGB BLD-MCNC: 8.9 G/DL (ref 12–15.9)
IMM GRANULOCYTES # BLD AUTO: 0.11 10*3/MM3 (ref 0–0.05)
IMM GRANULOCYTES NFR BLD AUTO: 1.6 % (ref 0–0.5)
LYMPHOCYTES # BLD AUTO: 1.69 10*3/MM3 (ref 0.7–3.1)
LYMPHOCYTES NFR BLD AUTO: 24.6 % (ref 19.6–45.3)
MCH RBC QN AUTO: 28.9 PG (ref 26.6–33)
MCHC RBC AUTO-ENTMCNC: 31.2 G/DL (ref 31.5–35.7)
MCV RBC AUTO: 92.5 FL (ref 79–97)
MONOCYTES # BLD AUTO: 0.88 10*3/MM3 (ref 0.1–0.9)
MONOCYTES NFR BLD AUTO: 12.8 % (ref 5–12)
NEUTROPHILS NFR BLD AUTO: 4.08 10*3/MM3 (ref 1.7–7)
NEUTROPHILS NFR BLD AUTO: 59.2 % (ref 42.7–76)
NRBC BLD AUTO-RTO: 0.1 /100 WBC (ref 0–0.2)
PLATELET # BLD AUTO: 238 10*3/MM3 (ref 140–450)
PMV BLD AUTO: 10.4 FL (ref 6–12)
POTASSIUM SERPL-SCNC: 4.1 MMOL/L (ref 3.5–5.2)
RBC # BLD AUTO: 3.08 10*6/MM3 (ref 3.77–5.28)
SODIUM SERPL-SCNC: 140 MMOL/L (ref 136–145)
WBC # BLD AUTO: 6.88 10*3/MM3 (ref 3.4–10.8)

## 2020-10-10 PROCEDURE — 85025 COMPLETE CBC W/AUTO DIFF WBC: CPT | Performed by: SURGERY

## 2020-10-10 PROCEDURE — 85730 THROMBOPLASTIN TIME PARTIAL: CPT | Performed by: SURGERY

## 2020-10-10 PROCEDURE — 85730 THROMBOPLASTIN TIME PARTIAL: CPT | Performed by: INTERNAL MEDICINE

## 2020-10-10 PROCEDURE — 80048 BASIC METABOLIC PNL TOTAL CA: CPT | Performed by: INTERNAL MEDICINE

## 2020-10-10 RX ADMIN — SENNOSIDES 2 TABLET: 8.6 TABLET, FILM COATED ORAL at 20:53

## 2020-10-10 RX ADMIN — METOPROLOL TARTRATE 6.25 MG: 25 TABLET, FILM COATED ORAL at 10:45

## 2020-10-10 RX ADMIN — Medication 500 MCG: at 10:45

## 2020-10-10 RX ADMIN — SODIUM CHLORIDE, PRESERVATIVE FREE 10 ML: 5 INJECTION INTRAVENOUS at 10:46

## 2020-10-10 RX ADMIN — SODIUM CHLORIDE, PRESERVATIVE FREE 10 ML: 5 INJECTION INTRAVENOUS at 20:53

## 2020-10-10 RX ADMIN — METOPROLOL TARTRATE 6.25 MG: 25 TABLET, FILM COATED ORAL at 20:53

## 2020-10-10 RX ADMIN — CLOPIDOGREL 75 MG: 75 TABLET, FILM COATED ORAL at 10:46

## 2020-10-10 NOTE — PLAN OF CARE
Goal Outcome Evaluation:  Plan of Care Reviewed With: daughter  Progress: improving  Outcome Summary: pt vss and afebrile during shift.  Pt on 2L o2 saturating @ 93-95%.  Pt daughters came to visit and they were updated on pt treatment and plan of care.  Pt Heparin no change during day at treatment levels.  pt possible d/c at beginning of week pending precertification for [place].  Pt had no acute issues, continued to monitor, safety maintained.

## 2020-10-10 NOTE — PLAN OF CARE
Goal Outcome Evaluation:  Plan of Care Reviewed With: patient  Progress: improving  Outcome Summary: pt denies pain or discomfort. vss. heparin gtt unchanged. turned q2hr. no acute distress noted

## 2020-10-10 NOTE — PROGRESS NOTES
" LOS: 3 days     Name: Gaby Diaz  Age: 89 y.o.  Sex: female  :  2/10/1931  MRN: [id number]         Primary Care Physician: Arsh Ford MD    Subjective   Subjective  Patient denies any chest pain or shortness of breath.  No overnight events.  Daughter at bedside.  No dizziness, visual changes, headache.    Objective   Vital Signs  Temp:  [97.4 °F (36.3 °C)-97.6 °F (36.4 °C)] 97.4 °F (36.3 °C)  Heart Rate:  [] 100  Resp:  [18-20] 18  BP: (104-162)/(70-85) 162/82  Body mass index is 20.59 kg/m².    Objective:  General Appearance:  Comfortable, in no acute distress and ill-appearing (Elderly and frail-appearing).    Vital signs: (most recent): Blood pressure 162/82, pulse 100, temperature 97.4 °F (36.3 °C), temperature source Oral, resp. rate 18, height 162.6 cm (64.02\"), weight 54.4 kg (120 lb), SpO2 93 %.    Lungs:  Normal effort and normal respiratory rate.    Heart: Normal rate.  Irregular rhythm.    Abdomen: Abdomen is soft.  Bowel sounds are normal.   There is no abdominal tenderness.     Extremities: There is no dependent edema or local swelling.    Neurological: Patient is alert and oriented to person, place and time.    Skin:  Warm and dry.              Results Review:       I reviewed the patient's new clinical results.    Results from last 7 days   Lab Units 10/10/20  0419 10/09/20  0550 10/08/20  0647 10/07/20  0952   WBC 10*3/mm3 6.88 8.96 8.60  8.63 11.78*   HEMOGLOBIN g/dL 8.9* 9.5* 8.9*  8.7* 9.8*   PLATELETS 10*3/mm3 238 239 200  200 228     Results from last 7 days   Lab Units 10/10/20  0419 10/09/20  1426 10/08/20  0446 10/07/20  0952   SODIUM mmol/L 140 140 143 142   POTASSIUM mmol/L 4.1 4.3 5.4* 4.3   CHLORIDE mmol/L 109* 108* 110* 105   CO2 mmol/L 22.6 22.1 21.6* 24.1   BUN mg/dL 30* 30* 31* 26*   CREATININE mg/dL 0.91 0.99 1.06* 0.94   CALCIUM mg/dL 7.8* 8.1* 8.2* 8.8   GLUCOSE mg/dL 77 84 94 118*     Results from last 7 days   Lab Units 10/08/20  1144   INR  1.06 "             Scheduled Meds:   clopidogrel, 75 mg, Oral, Daily  metoprolol tartrate, 6.25 mg, Oral, BID  senna, 2 tablet, Oral, Nightly  sodium chloride, 10 mL, Intravenous, Q12H  vitamin B-12, 500 mcg, Oral, Daily      PRN Meds:   heparin (porcine)  •  lactulose  •  nitroglycerin  •  ondansetron  •  sodium chloride  Continuous Infusions:  heparin (porcine), 12 Units/kg/hr, Last Rate: 13 Units/kg/hr (10/09/20 1751)        Assessment/Plan   Active Hospital Problems    Diagnosis  POA   • **Pulmonary embolus (CMS/HCC) [I26.99]  Unknown   • Hyperkalemia [E87.5]  Unknown   • Pulmonary emboli (CMS/HCC) [I26.99]  Yes   • DNR (do not resuscitate) [Z66]  Unknown   • Dementia without behavioral disturbance (CMS/HCC) [F03.90]  Unknown   • Pleural effusion on right [J90]  Unknown   • Anemia [D64.9]  Yes   • Atrial fibrillation (CMS/HCC) [I48.91]  Yes   • Essential hypertension [I10]  Yes   • Hyperlipidemia [E78.5]  Unknown      Resolved Hospital Problems   No resolved problems to display.       Assessment & Plan    -Continues to remain stable on heparin drip.  Neurosurgery and vascular surgery have signed off.  -Cardiology also following and have determined that PEs are not amenable to intervention.  Heart rate and blood pressure are stable.    Plavix has been restarted  -I think we can consider transition to Eliquis tomorrow  -Mild hyperkalemia resolved  -We will begin to mobilize her with PT  -She will return to [facility] for rehab upon discharge.  Will need pre-CERT on Monday.    Spoke with daughter at bedside      I wore full protective equipment throughout the patient encounter including eye protection and facemask.  Hand hygiene was performed before donning protective equipment and after removal when leaving the room.    Alexis Wisdom MD  [facility]  10/10/20  12:38 EDT

## 2020-10-11 LAB
APTT PPP: 80.5 SECONDS (ref 22.7–35.4)
BASOPHILS # BLD AUTO: 0.03 10*3/MM3 (ref 0–0.2)
BASOPHILS NFR BLD AUTO: 0.4 % (ref 0–1.5)
DEPRECATED RDW RBC AUTO: 50.2 FL (ref 37–54)
EOSINOPHIL # BLD AUTO: 0.13 10*3/MM3 (ref 0–0.4)
EOSINOPHIL NFR BLD AUTO: 1.8 % (ref 0.3–6.2)
ERYTHROCYTE [DISTWIDTH] IN BLOOD BY AUTOMATED COUNT: 15 % (ref 12.3–15.4)
HCT VFR BLD AUTO: 30.4 % (ref 34–46.6)
HGB BLD-MCNC: 9.4 G/DL (ref 12–15.9)
IMM GRANULOCYTES # BLD AUTO: 0.09 10*3/MM3 (ref 0–0.05)
IMM GRANULOCYTES NFR BLD AUTO: 1.3 % (ref 0–0.5)
LYMPHOCYTES # BLD AUTO: 2.04 10*3/MM3 (ref 0.7–3.1)
LYMPHOCYTES NFR BLD AUTO: 28.6 % (ref 19.6–45.3)
MCH RBC QN AUTO: 28.4 PG (ref 26.6–33)
MCHC RBC AUTO-ENTMCNC: 30.9 G/DL (ref 31.5–35.7)
MCV RBC AUTO: 91.8 FL (ref 79–97)
MONOCYTES # BLD AUTO: 0.81 10*3/MM3 (ref 0.1–0.9)
MONOCYTES NFR BLD AUTO: 11.4 % (ref 5–12)
NEUTROPHILS NFR BLD AUTO: 4.03 10*3/MM3 (ref 1.7–7)
NEUTROPHILS NFR BLD AUTO: 56.5 % (ref 42.7–76)
NRBC BLD AUTO-RTO: 0 /100 WBC (ref 0–0.2)
PLATELET # BLD AUTO: 287 10*3/MM3 (ref 140–450)
PMV BLD AUTO: 10.7 FL (ref 6–12)
RBC # BLD AUTO: 3.31 10*6/MM3 (ref 3.77–5.28)
WBC # BLD AUTO: 7.13 10*3/MM3 (ref 3.4–10.8)

## 2020-10-11 PROCEDURE — 85730 THROMBOPLASTIN TIME PARTIAL: CPT | Performed by: SURGERY

## 2020-10-11 PROCEDURE — 85025 COMPLETE CBC W/AUTO DIFF WBC: CPT | Performed by: SURGERY

## 2020-10-11 RX ADMIN — Medication 500 MCG: at 10:09

## 2020-10-11 RX ADMIN — SODIUM CHLORIDE, PRESERVATIVE FREE 10 ML: 5 INJECTION INTRAVENOUS at 10:10

## 2020-10-11 RX ADMIN — SODIUM CHLORIDE, PRESERVATIVE FREE 10 ML: 5 INJECTION INTRAVENOUS at 20:07

## 2020-10-11 RX ADMIN — APIXABAN 5 MG: 5 TABLET, FILM COATED ORAL at 14:34

## 2020-10-11 RX ADMIN — METOPROLOL TARTRATE 6.25 MG: 25 TABLET, FILM COATED ORAL at 20:06

## 2020-10-11 RX ADMIN — CLOPIDOGREL 75 MG: 75 TABLET, FILM COATED ORAL at 10:09

## 2020-10-11 RX ADMIN — APIXABAN 5 MG: 5 TABLET, FILM COATED ORAL at 20:07

## 2020-10-11 RX ADMIN — SENNOSIDES 2 TABLET: 8.6 TABLET, FILM COATED ORAL at 20:07

## 2020-10-11 RX ADMIN — METOPROLOL TARTRATE 6.25 MG: 25 TABLET, FILM COATED ORAL at 10:09

## 2020-10-11 NOTE — PROGRESS NOTES
" LOS: 4 days     Name: Gayb Diaz  Age: 89 y.o.  Sex: female  :  2/10/1931  MRN: [id number]         Primary Care Physician: Arsh Ford MD    Subjective   Subjective  No new complaints or overnight events.  No signs of bleeding and appears stable neurologically.  Denies headache, visual changes, dizziness or lightheadedness.    Objective   Vital Signs  Temp:  [97.4 °F (36.3 °C)-98.1 °F (36.7 °C)] 97.4 °F (36.3 °C)  Heart Rate:  [73-88] 73  Resp:  [18-20] 18  BP: (111-127)/(64-72) 111/64  Body mass index is 20.59 kg/m².    Objective:  General Appearance:  Comfortable and in no acute distress (Elderly and frail-appearing).    Vital signs: (most recent): Blood pressure 111/64, pulse 73, temperature 97.4 °F (36.3 °C), resp. rate 18, height 162.6 cm (64.02\"), weight 54.4 kg (120 lb), SpO2 97 %.    Lungs:  Normal effort and normal respiratory rate.    Heart: Normal rate.  Regular rhythm.    Abdomen: Abdomen is soft.  Bowel sounds are normal.   There is no abdominal tenderness.     Extremities: There is no dependent edema or local swelling.    Neurological: Patient is alert and oriented to person, place and time.    Skin:  Warm and dry.              Results Review:       I reviewed the patient's new clinical results.    Results from last 7 days   Lab Units 10/11/20  0559 10/10/20  0419 10/09/20  0550 10/08/20  0647 10/07/20  0952   WBC 10*3/mm3 7.13 6.88 8.96 8.60  8.63 11.78*   HEMOGLOBIN g/dL 9.4* 8.9* 9.5* 8.9*  8.7* 9.8*   PLATELETS 10*3/mm3 287 238 239 200  200 228     Results from last 7 days   Lab Units 10/10/20  0419 10/09/20  1426 10/08/20  0446 10/07/20  0952   SODIUM mmol/L 140 140 143 142   POTASSIUM mmol/L 4.1 4.3 5.4* 4.3   CHLORIDE mmol/L 109* 108* 110* 105   CO2 mmol/L 22.6 22.1 21.6* 24.1   BUN mg/dL 30* 30* 31* 26*   CREATININE mg/dL 0.91 0.99 1.06* 0.94   CALCIUM mg/dL 7.8* 8.1* 8.2* 8.8   GLUCOSE mg/dL 77 84 94 118*     Results from last 7 days   Lab Units 10/08/20  1144   INR  1.06 "             Scheduled Meds:   apixaban, 5 mg, Oral, Q12H  clopidogrel, 75 mg, Oral, Daily  metoprolol tartrate, 6.25 mg, Oral, BID  senna, 2 tablet, Oral, Nightly  sodium chloride, 10 mL, Intravenous, Q12H  vitamin B-12, 500 mcg, Oral, Daily      PRN Meds:   heparin (porcine)  •  lactulose  •  nitroglycerin  •  ondansetron  •  sodium chloride  Continuous Infusions:       Assessment/Plan   Active Hospital Problems    Diagnosis  POA   • **Pulmonary embolus (CMS/HCC) [I26.99]  Unknown   • Hyperkalemia [E87.5]  Unknown   • Pulmonary emboli (CMS/HCC) [I26.99]  Yes   • DNR (do not resuscitate) [Z66]  Unknown   • Dementia without behavioral disturbance (CMS/HCC) [F03.90]  Unknown   • Pleural effusion on right [J90]  Unknown   • Anemia [D64.9]  Yes   • Atrial fibrillation (CMS/HCC) [I48.91]  Yes   • Essential hypertension [I10]  Yes   • Hyperlipidemia [E78.5]  Unknown      Resolved Hospital Problems   No resolved problems to display.       Assessment & Plan    -Continues to remain stable on heparin drip.    I am going to go ahead and transition her to Eliquis today as recommended by consulting services.  Due to the recent intraventricular hemorrhage will start her on 5 mg twice daily and not the usual loading dose of 10 mg twice daily for the first week.  Neurosurgery and vascular surgery have signed off.  -Cardiology has determined that PEs are not amenable to intervention.  Heart rate and blood pressure are stable.    Plavix has been restarted.  -Mild hyperkalemia resolved  -We will mobilize her with PT  -She will return to [facility] for rehab upon discharge.  Will need pre-CERT on Monday.  -She will follow-up with vascular surgery in 6 to 8 weeks to reevaluate the lower extremity clots      I wore full protective equipment throughout the patient encounter including eye protection and facemask.  Hand hygiene was performed before donning protective equipment and after removal when leaving the room.    Alexis  Feliciano Wisdom MD  [facility]  10/11/20  13:30 EDT

## 2020-10-12 PROBLEM — E87.5 HYPERKALEMIA: Status: RESOLVED | Noted: 2020-10-09 | Resolved: 2020-10-12

## 2020-10-12 LAB
BASOPHILS # BLD AUTO: 0.03 10*3/MM3 (ref 0–0.2)
BASOPHILS NFR BLD AUTO: 0.5 % (ref 0–1.5)
DEPRECATED RDW RBC AUTO: 49.8 FL (ref 37–54)
EOSINOPHIL # BLD AUTO: 0.16 10*3/MM3 (ref 0–0.4)
EOSINOPHIL NFR BLD AUTO: 2.9 % (ref 0.3–6.2)
ERYTHROCYTE [DISTWIDTH] IN BLOOD BY AUTOMATED COUNT: 15.1 % (ref 12.3–15.4)
HCT VFR BLD AUTO: 25.8 % (ref 34–46.6)
HGB BLD-MCNC: 8 G/DL (ref 12–15.9)
IMM GRANULOCYTES # BLD AUTO: 0.07 10*3/MM3 (ref 0–0.05)
IMM GRANULOCYTES NFR BLD AUTO: 1.3 % (ref 0–0.5)
LYMPHOCYTES # BLD AUTO: 1.94 10*3/MM3 (ref 0.7–3.1)
LYMPHOCYTES NFR BLD AUTO: 35 % (ref 19.6–45.3)
MCH RBC QN AUTO: 27.9 PG (ref 26.6–33)
MCHC RBC AUTO-ENTMCNC: 31 G/DL (ref 31.5–35.7)
MCV RBC AUTO: 89.9 FL (ref 79–97)
MONOCYTES # BLD AUTO: 0.7 10*3/MM3 (ref 0.1–0.9)
MONOCYTES NFR BLD AUTO: 12.6 % (ref 5–12)
NEUTROPHILS NFR BLD AUTO: 2.65 10*3/MM3 (ref 1.7–7)
NEUTROPHILS NFR BLD AUTO: 47.7 % (ref 42.7–76)
NRBC BLD AUTO-RTO: 0 /100 WBC (ref 0–0.2)
PLATELET # BLD AUTO: 278 10*3/MM3 (ref 140–450)
PMV BLD AUTO: 10.1 FL (ref 6–12)
RBC # BLD AUTO: 2.87 10*6/MM3 (ref 3.77–5.28)
WBC # BLD AUTO: 5.55 10*3/MM3 (ref 3.4–10.8)

## 2020-10-12 PROCEDURE — 85025 COMPLETE CBC W/AUTO DIFF WBC: CPT | Performed by: SURGERY

## 2020-10-12 PROCEDURE — 97110 THERAPEUTIC EXERCISES: CPT

## 2020-10-12 RX ADMIN — Medication 500 MCG: at 09:22

## 2020-10-12 RX ADMIN — METOPROLOL TARTRATE 6.25 MG: 25 TABLET, FILM COATED ORAL at 09:22

## 2020-10-12 RX ADMIN — APIXABAN 5 MG: 5 TABLET, FILM COATED ORAL at 20:41

## 2020-10-12 RX ADMIN — SODIUM CHLORIDE, PRESERVATIVE FREE 10 ML: 5 INJECTION INTRAVENOUS at 09:23

## 2020-10-12 RX ADMIN — SENNOSIDES 2 TABLET: 8.6 TABLET, FILM COATED ORAL at 20:41

## 2020-10-12 RX ADMIN — APIXABAN 5 MG: 5 TABLET, FILM COATED ORAL at 09:22

## 2020-10-12 RX ADMIN — SODIUM CHLORIDE, PRESERVATIVE FREE 10 ML: 5 INJECTION INTRAVENOUS at 20:42

## 2020-10-12 RX ADMIN — CLOPIDOGREL 75 MG: 75 TABLET, FILM COATED ORAL at 09:22

## 2020-10-12 RX ADMIN — METOPROLOL TARTRATE 6.25 MG: 25 TABLET, FILM COATED ORAL at 20:42

## 2020-10-12 NOTE — PLAN OF CARE
Goal Outcome Evaluation:  Plan of Care Reviewed With: patient  Progress: improving  Outcome Summary: Pt remains on 2L NC. No complaints this shift. Continues to be pleasantly confused, easy to reorient. Eliquis given. Continues in AFIB but had a 2.2 second pause, asymptomatic. Possible D/C today. Safety maintained.

## 2020-10-12 NOTE — THERAPY TREATMENT NOTE
Patient Name: Gaby Diaz  : 2/10/1931    MRN: [id number]                              Today's Date: 10/12/2020       Admit Date: 10/7/2020    Visit Dx:     ICD-10-CM ICD-9-CM   1. Acute saddle pulmonary embolism with acute cor pulmonale (CMS/HCC)  I26.02 415.13     415.0     Patient Active Problem List   Diagnosis   • Atrial fibrillation (CMS/HCC)   • Hyperlipidemia   • Essential hypertension   • Intraventricular hemorrhage (CMS/HCC)   • Fall -    • Anemia   • Pulmonary embolus (CMS/HCC)   • DNR (do not resuscitate)   • Dementia without behavioral disturbance (CMS/HCC)   • Pleural effusion on right   • Pulmonary emboli (CMS/HCC)     Past Medical History:   Diagnosis Date   • Arthritis of back    • Arthritis of neck    • Atrial fibrillation (CMS/HCC)    • Hip arthrosis    • Hyperlipidemia    • Hypertension    • Knee swelling    • Myocardial ischemia     inferior wall   • Stroke syndrome      Past Surgical History:   Procedure Laterality Date   • HIP SURGERY     • REPLACEMENT TOTAL KNEE Bilateral      General Information     Row Name 10/12/20 104          Physical Therapy Time and Intention    Document Type  therapy note (daily note)  -PC     Mode of Treatment  physical therapy  -PC     Row Name 10/12/20 104          General Information    Existing Precautions/Restrictions  fall  -PC     Row Name 10/12/20 104          Cognition    Orientation Status (Cognition)  oriented to;person;place;verbal cues/prompts needed for orientation  -PC     Row Name 10/12/20 104          Safety Issues, Functional Mobility    Safety Issues Affecting Function (Mobility)  insight into deficits/self-awareness  -PC     Impairments Affecting Function (Mobility)  endurance/activity tolerance;shortness of breath;strength  -PC       User Key  (r) = Recorded By, (t) = Taken By, (c) = Cosigned By    Initials Name Provider Type    PC Preeti Francois PT Physical Therapist        Mobility     Row Name 10/12/20 1047          Bed  Mobility    Supine-Sit [place] (Bed Mobility)  minimum assist (75% patient effort)  -PC     Row Name 10/12/20 1047          Sit-Stand Transfer    Sit-Stand [place] (Transfers)  minimum assist (75% patient effort)  -PC     Assistive Device (Sit-Stand Transfers)  walker, front-wheeled  -PC     Row Name 10/12/20 1047          Gait/Stairs (Locomotion)    [place] Level (Gait)  minimum assist (75% patient effort)  -PC     Assistive Device (Gait)  walker, front-wheeled  -PC     Distance in Feet (Gait)  25 ft, pt with thoracic kyphosis, ambulates in a forward flexed position, dec pelvic stability and has significant trendelenberg  when taking a step with right LE, poor gait quality and pt fatigued quickly  -PC       User Key  (r) = Recorded By, (t) = Taken By, (c) = Cosigned By    Initials Name Provider Type    PC Preeti Francois, PT Physical Therapist        Obj/Interventions    No documentation.       Goals/Plan    No documentation.       Clinical Impression     Row Name 10/12/20 1051          Pain Scale: Numbers Pre/Post-Treatment    Pretreatment Pain Rating  0/10 - no pain  -     Row Name 10/12/20 1051          Plan of Care Review    Plan of Care Reviewed With  patient  -PC     Outcome Summary  pt amb 25 ft with min assist, gait quality is poor and pt needed assist to steer walker, pt has thoracic kyphosis so gait is forward flexed and has positive trendelenberg when taking a step with RLE, anticipate return to SNF at discharge  -     Row Name 10/12/20 1051          Positioning and Restraints    Pre-Treatment Position  in bed  -PC     Post Treatment Position  chair  -PC     In Chair  reclined;call light within reach;encouraged to call for assist;exit alarm on  -PC       User Key  (r) = Recorded By, (t) = Taken By, (c) = Cosigned By    Initials Name Provider Type    PC Preeti Francois, PT Physical Therapist        Outcome Measures     Row Name 10/12/20 1053          How much help from another person do  you currently need...    Turning from your back to your side while in flat bed without using bedrails?  3  -PC     Moving from lying on back to sitting on the side of a flat bed without bedrails?  3  -PC     Moving to and from a bed to a chair (including a wheelchair)?  3  -PC     Standing up from a chair using your arms (e.g., wheelchair, bedside chair)?  3  -PC     Climbing 3-5 steps with a railing?  2  -PC     To walk in hospital room?  3  -PC     AM-PAC 6 Clicks Score (PT)  17  -PC       User Key  (r) = Recorded By, (t) = Taken By, (c) = Cosigned By    Initials Name Provider Type    PC Preeti Francois, PT Physical Therapist        Physical Therapy Education                 Title: PT OT SLP Therapies (Not Started)     Topic: Physical Therapy (In Progress)     Point: Mobility training (Done)     Learning Progress Summary           Patient Acceptance, E,D, DU,NR by  at 10/12/2020 1054    Acceptance, E, NR by AR at 10/9/2020 1547                   Point: Home exercise program (In Progress)     Learning Progress Summary           Patient Acceptance, E, NR by AR at 10/9/2020 1547                   Point: Body mechanics (Done)     Learning Progress Summary           Patient Acceptance, E,D, DU,NR by  at 10/12/2020 1054    Acceptance, E, NR by AR at 10/9/2020 1547                   Point: Precautions (Done)     Learning Progress Summary           Patient Acceptance, E,D, DU,NR by  at 10/12/2020 1054    Acceptance, E, NR by AR at 10/9/2020 1547                               User Key     Initials Effective Dates Name Provider Type Discipline     04/03/18 -  Preeti Francois PT Physical Therapist PT    AR 04/03/18 -  Kristan Wiggins PT Physical Therapist PT              PT Recommendation and Plan     Plan of Care Reviewed With: patient  Outcome Summary: pt amb 25 ft with min assist, gait quality is poor and pt needed assist to steer walker, pt has thoracic kyphosis so gait is forward flexed and has positive  lesli when taking a step with RLE, anticipate return to SNF at discharge     Time Calculation:   PT Charges     Row Name 10/12/20 1054             Time Calculation    Start Time  0849  -PC      Stop Time  0904  -PC      Time Calculation (min)  15 min  -PC      PT Received On  10/12/20  -PC      PT - Next Appointment  10/13/20  -PC        User Key  (r) = Recorded By, (t) = Taken By, (c) = Cosigned By    Initials Name Provider Type    PC Preeti Francois, PT Physical Therapist        Therapy Charges for Today     Code Description Service Date Service Provider Modifiers Qty    59429111061 HC PT THER PROC EA 15 MIN 10/12/2020 Preeti Francois, PT GP 1    74300023637 HC PT THER SUPP EA 15 MIN 10/12/2020 Preeti Francois, PT GP 1          PT G-Codes  Outcome Measure Options: AM-PAC 6 Clicks Basic Mobility (PT)  AM-PAC 6 Clicks Score (PT): 17    Preeti Francois PT  10/12/2020

## 2020-10-12 NOTE — PLAN OF CARE
Problem: Adult Inpatient Plan of Care  Goal: Plan of Care Review  Recent Flowsheet Documentation  Taken 10/12/2020 1051 by Preeti Francois PT  Plan of Care Reviewed With: patient  Outcome Summary: pt amb 25 ft with min assist, gait quality is poor and pt needed assist to steer walker, pt has thoracic kyphosis so gait is forward flexed and has positive trendelenberg when taking a step with RLE, anticipate return to SNF at discharge   Patient was intermittently wearing a face mask during this therapy encounter. Therapist used appropriate personal protective equipment including eye protection, mask, and gloves.  Mask used was standard procedure mask. Appropriate PPE was worn during the entire therapy session. Hand hygiene was completed before and after therapy session. Patient is not in enhanced droplet precautions.   PT salma dan

## 2020-10-12 NOTE — PROGRESS NOTES
[facility]               ASSOCIATES     LOS: 5 days     Name: Gaby Diaz  Age: 89 y.o.  Sex: female  :  2/10/1931  MRN: [id number]         Primary Care Physician: Arsh Ford MD    Diet Regular; Cardiac    Subjective   No shortness of breath at rest.  Some shortness of breath with exertion.  No chest pain.    Review of Systems   Constitutional: Negative for fever.   Respiratory: Negative for cough.    Cardiovascular: Negative for chest pain.   Gastrointestinal: Negative for abdominal pain.     Objective   Temp:  [97.4 °F (36.3 °C)-97.6 °F (36.4 °C)] 97.4 °F (36.3 °C)  Heart Rate:  [73-86] 86  Resp:  [16-18] 16  BP: ()/(58-75) 120/75  SpO2:  [97 %] 97 %  on  Flow (L/min):  [1-2] 1;   Device (Oxygen Therapy): nasal cannula  Body mass index is 20.59 kg/m².    Physical Exam  Vitals signs and nursing note reviewed.   Constitutional:       General: She is not in acute distress.  HENT:      Head: Normocephalic and atraumatic.   Cardiovascular:      Rate and Rhythm: Normal rate. Rhythm irregularly irregular.   Pulmonary:      Effort: Pulmonary effort is normal. No respiratory distress.      Breath sounds: Normal breath sounds.   Abdominal:      General: Bowel sounds are normal.      Palpations: Abdomen is soft.      Tenderness: There is no abdominal tenderness. There is no guarding or rebound.   Skin:     General: Skin is warm and dry.   Neurological:      General: No focal deficit present.      Mental Status: She is alert.   Psychiatric:         Mood and Affect: Mood normal.         Behavior: Behavior normal.     While in the room and during my examination of the patient I wore gloves, mask, eye protection.  I washed my hands before and after this patient encounter.     Reviewed medications and new clinical results    Scheduled Meds  apixaban, 5 mg, Oral, Q12H  clopidogrel, 75 mg, Oral, Daily  metoprolol tartrate, 6.25 mg, Oral, BID  senna, 2 tablet, Oral,  Nightly  sodium chloride, 10 mL, Intravenous, Q12H  vitamin B-12, 500 mcg, Oral, Daily    Continuous Infusions   PRN Meds  lactulose  •  nitroglycerin  •  ondansetron  •  sodium chloride    Results from last 7 days   Lab Units 10/12/20  0622 10/11/20  0559 10/10/20  0419 10/09/20  0550 10/08/20  0647 10/07/20  0952   WBC 10*3/mm3 5.55 7.13 6.88 8.96 8.60  8.63 11.78*   HEMOGLOBIN g/dL 8.0* 9.4* 8.9* 9.5* 8.9*  8.7* 9.8*   PLATELETS 10*3/mm3 278 287 238 239 200  200 228     Results from last 7 days   Lab Units 10/10/20  0419 10/09/20  1426 10/08/20  0446 10/07/20  0952   SODIUM mmol/L 140 140 143 142   POTASSIUM mmol/L 4.1 4.3 5.4* 4.3   CHLORIDE mmol/L 109* 108* 110* 105   CO2 mmol/L 22.6 22.1 21.6* 24.1   BUN mg/dL 30* 30* 31* 26*   CREATININE mg/dL 0.91 0.99 1.06* 0.94   CALCIUM mg/dL 7.8* 8.1* 8.2* 8.8   GLUCOSE mg/dL 77 84 94 118*     Lab Results   Component Value Date    ANIONGAP 8.4 10/10/2020     Estimated Creatinine Clearance: 36 mL/min (by C-G formula based on SCr of 0.91 mg/dL).    Lab Results   Component Value Date    COVID19 Not Detected 10/07/2020     Assessment/Plan   Active Hospital Problems    Diagnosis  POA   • **Pulmonary embolus (CMS/HCC) [I26.99]  Unknown   • Pulmonary emboli (CMS/HCC) [I26.99]  Yes   • DNR (do not resuscitate) [Z66]  Unknown   • Dementia without behavioral disturbance (CMS/HCC) [F03.90]  Unknown   • Pleural effusion on right [J90]  Unknown   • Anemia [D64.9]  Yes   • Atrial fibrillation (CMS/HCC) [I48.91]  Yes   • Essential hypertension [I10]  Yes   • Hyperlipidemia [E78.5]  Unknown      Resolved Hospital Problems    Diagnosis Date Resolved POA   • Hyperkalemia [E87.5] 10/12/2020 Unknown     89 y.o. female with submassive PE, DVT    · Transitioned to Eliquis  · Vascular surgery recommends repeating duplex 6 to 8 weeks  · Discussed with neurosurgery: Okay to be back on Plavix  · Disposition: [facility] pending precertification  · Discussed with patient and nursing staff and  Rosio Mahmood, neurosurgery APRN    Flakito Lyons MD   10/12/20  10:22 EDT

## 2020-10-12 NOTE — PROGRESS NOTES
Continued Stay Note  [facility]     Patient Name: Gaby Diaz  MRN: [id number]  Today's Date: 10/12/2020    Admit Date: 10/7/2020    Discharge Plan     Row Name 10/12/20 1613       Plan    Plan  [facility] (pending precert)    Patient/Family in Agreement with Plan  yes    Plan Comments  Called [facility] and talked to Antoine and he is starting precert today for SNF.        Discharge Codes    No documentation.             Shannon Epley, RN

## 2020-10-12 NOTE — PLAN OF CARE
Goal Outcome Evaluation:  Plan of Care Reviewed With: patient  Progress: no change VSS. Waiting for precert. Monitor vital signs and labs.

## 2020-10-12 NOTE — DISCHARGE SUMMARY
[facility]    Date of Discharge:  10/15/2020    PCP: Arsh Ford MD    Discharge Diagnosis:   Active Hospital Problems    Diagnosis  POA   • **Pulmonary embolus (CMS/HCC) [I26.99]  Unknown   • Pulmonary emboli (CMS/HCC) [I26.99]  Yes   • DNR (do not resuscitate) [Z66]  Unknown   • Dementia without behavioral disturbance (CMS/HCC) [F03.90]  Unknown   • Pleural effusion on right [J90]  Unknown   • Anemia [D64.9]  Yes   • Atrial fibrillation (CMS/HCC) [I48.91]  Yes   • Essential hypertension [I10]  Yes   • Hyperlipidemia [E78.5]  Unknown      Resolved Hospital Problems    Diagnosis Date Resolved POA   • Hyperkalemia [E87.5] 10/12/2020 Unknown      Consults     Date and Time Order Name Status Description    10/8/2020 0056 Inpatient Neurosurgery Consult Completed     10/8/2020 0056 Inpatient Cardiology Consult Completed     10/8/2020 0056 Inpatient Vascular Surgery Consult Completed     9/18/2020 1744 [facility] (on-call MD unless specified) Details Completed     9/18/2020 1708 Neurosurgery (on-call MD unless specified) Completed         Hospital Course  Please see history and physical for details. Patient is a 89 y.o. female with a history of dementia sent from outside hospital for submassive PE.  She presented with shortness of breath and hypoxia.  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  At the outside facility it was felt that she was high risk for anticoagulation.  Here CT showed that the volume of intraventricular hemorrhage had decreased compared to prior.  Ultrasound showed bilateral DVTs.  Neurosurgery, cardiology, and vascular surgery saw the patient to weigh in on the risk of anticoagulation.  Cardiology felt her thrombus would be difficult to treat effectively with thrombectomy.  Neurosurgery felt risk of hemorrhage was difficult to determine however the risk of death due to submassive  PE untreated was felt to be greater then risk of re-hemorrhage and she was started on heparin and monitored.  Vascular felt that filter placement should only be pursued if she absolutely cannot tolerate anticoagulation.  She was started on heparin and she remained stable and was transitioned to Eliquis and has been stable the last few days on Eliquis.  Vascular recommends 6 to 8 weeks surveillance duplex to see how her clots are doing.  Cardiology felt that ideally she would be on Plavix for recent PCI in the setting of NSTEMI and that was cleared by neurosurgery.  She is going to return to [facility] at discharge.    I discussed the patient's findings and my recommendations with patient and nursing staff.    Condition on Discharge: Improved.  No shortness of breath at rest.     Temp:  [97.4 °F (36.3 °C)-98.4 °F (36.9 °C)] 98.4 °F (36.9 °C)  Heart Rate:  [64-81] 76  Resp:  [18] 18  BP: (107-134)/(56-77) 124/77  Body mass index is 20.59 kg/m².    Physical Exam  Vitals signs and nursing note reviewed.   Constitutional:       General: She is not in acute distress.  HENT:      Head: Normocephalic and atraumatic.   Cardiovascular:      Rate and Rhythm: Normal rate. Rhythm irregularly irregular.   Pulmonary:      Effort: Pulmonary effort is normal. No respiratory distress.      Breath sounds: Normal breath sounds.   Abdominal:      General: Bowel sounds are normal.      Palpations: Abdomen is soft.      Tenderness: There is no abdominal tenderness. There is no guarding or rebound.   Skin:     General: Skin is warm and dry.   Neurological:      General: No focal deficit present.      Mental Status: She is alert.   Psychiatric:         Mood and Affect: Mood normal.         Behavior: Behavior normal.     While in the room and during my examination of the patient I wore gloves, mask, eye protection.  I washed my hands before and after this patient encounter.     Disposition: Skilled Nursing Facility ([place] -  External)       Discharge Medications      New Medications      Instructions Start Date   apixaban 5 MG tablet tablet  Commonly known as: ELIQUIS   5 mg, Oral, Every 12 Hours Scheduled      clopidogrel 75 MG tablet  Commonly known as: PLAVIX   75 mg, Oral, Daily         Continue These Medications      Instructions Start Date   acetaminophen 500 MG tablet  Commonly known as: TYLENOL   500 mg, Oral, Every 6 Hours PRN      Artificial Tears 1 % ophthalmic solution  Generic drug: carboxymethylcellulose   1 drop, Both Eyes, 2 Times Daily PRN      bisacodyl 10 MG suppository  Commonly known as: DULCOLAX   10 mg, Rectal, Daily PRN      ferrous sulfate 325 (65 FE) MG tablet   325 mg, Oral, 2 times daily      lactulose 10 GM/15ML solution  Commonly known as: CHRONULAC   20 g, Oral, Daily PRN      metoprolol tartrate 25 MG tablet  Commonly known as: LOPRESSOR   6.25 mg, Oral, 2 Times Daily      senna 8.6 MG tablet  Commonly known as: SENOKOT   2 tablets, Oral, Nightly      vitamin B-12 500 MCG tablet  Commonly known as: CYANOCOBALAMIN   500 mcg, Oral, Daily         Stop These Medications    potassium chloride 20 MEQ CR tablet  Commonly known as: K-DUR,KLOR-CON           Diet Instructions     Diet: Regular, Cardiac      Discharge Diet:  Regular  Cardiac            Activity Instructions     Activity as Tolerated           Additional Instructions for the Follow-ups that You Need to Schedule     Call MD for problems / concerns.   As directed        Follow-up Information     Max Omer MD Follow up in 6 week(s).    Specialty: Vascular Surgery  Why: Bilateral lower extremity venous duplex  Contact information:  [address] [facility] [address]  [place] [id number] [phone]             Arsh Ford MD Follow up in 1 week(s).    Specialty: Family Medicine  Contact information:  [address] [facility] [address] [phone]                     Flakito Lyons MD  10/15/20  10:09 EDT    Discharge time spent greater  than 30 minutes.

## 2020-10-13 LAB
HCT VFR BLD AUTO: 26.9 % (ref 34–46.6)
HGB BLD-MCNC: 8.2 G/DL (ref 12–15.9)

## 2020-10-13 PROCEDURE — 97110 THERAPEUTIC EXERCISES: CPT

## 2020-10-13 PROCEDURE — 85018 HEMOGLOBIN: CPT | Performed by: HOSPITALIST

## 2020-10-13 PROCEDURE — 85014 HEMATOCRIT: CPT | Performed by: HOSPITALIST

## 2020-10-13 RX ADMIN — APIXABAN 5 MG: 5 TABLET, FILM COATED ORAL at 21:59

## 2020-10-13 RX ADMIN — CLOPIDOGREL 75 MG: 75 TABLET, FILM COATED ORAL at 09:04

## 2020-10-13 RX ADMIN — SENNOSIDES 2 TABLET: 8.6 TABLET, FILM COATED ORAL at 21:59

## 2020-10-13 RX ADMIN — SODIUM CHLORIDE, PRESERVATIVE FREE 10 ML: 5 INJECTION INTRAVENOUS at 09:04

## 2020-10-13 RX ADMIN — Medication 500 MCG: at 09:04

## 2020-10-13 RX ADMIN — METOPROLOL TARTRATE 6.25 MG: 25 TABLET, FILM COATED ORAL at 09:04

## 2020-10-13 RX ADMIN — METOPROLOL TARTRATE 6.25 MG: 25 TABLET, FILM COATED ORAL at 22:00

## 2020-10-13 RX ADMIN — APIXABAN 5 MG: 5 TABLET, FILM COATED ORAL at 09:04

## 2020-10-13 RX ADMIN — SODIUM CHLORIDE, PRESERVATIVE FREE 10 ML: 5 INJECTION INTRAVENOUS at 22:00

## 2020-10-13 NOTE — PLAN OF CARE
Goal Outcome Evaluation:  Plan of Care Reviewed With: patient  Progress: no change  Outcome Summary: VSS. Waiting on placement. Denies pain. Monitor vital signs.

## 2020-10-13 NOTE — PROGRESS NOTES
Continued Stay Note  [place]     Patient Name: Gaby Diaz  MRN: [id number]  Today's Date: 10/13/2020    Admit Date: 10/7/2020    Discharge Plan     Row Name 10/13/20 1450       Plan    Plan  [facility] vs. LTC    Patient/Family in Agreement with Plan  yes    Plan Comments  Natan requested peer to peer and Dr. Lyons called and they denied stating she needs LTC.  I called [facility]ine/[facility] and they talked to the family who wants to do another appeal.  I called the daughter, Romelia and confirmed they were appealing and asked if denied what the plan would be and she said they plan on doing LTC at [facility] if the appeal is denied.  Waiting on appeal decision.  If it is denied and [facility] has family to agree to the medicaid pending then they can take the patient back.        Discharge Codes    No documentation.       Expected Discharge Date and Time     Expected Discharge Date Expected Discharge Time    Oct 13, 2020             Shannon Epley, RN

## 2020-10-13 NOTE — PLAN OF CARE
Problem: Adult Inpatient Plan of Care  Goal: Plan of Care Review  Recent Flowsheet Documentation  Taken 10/13/2020 1030 by Preeti Francois, PT  Progress: improving  Plan of Care Reviewed With: patient  Outcome Summary: pt showing progress with functional mobility with less assist required for bed mobility, standing and gait, and able to walk with improved activity tolerance with O2 sats 98% on 2l with less SOA   Patient was intermittently wearing a face mask during this therapy encounter. Therapist used appropriate personal protective equipment including eye protection, mask, and gloves.  Mask used was standard procedure mask. Appropriate PPE was worn during the entire therapy session. Hand hygiene was completed before and after therapy session. Patient is not in enhanced droplet precautions.

## 2020-10-13 NOTE — PROGRESS NOTES
[facility]               ASSOCIATES     LOS: 6 days     Name: Gaby Diaz  Age: 89 y.o.  Sex: female  :  2/10/1931  MRN: [id number]         Primary Care Physician: Arsh Ford MD    Diet Regular; Cardiac    Subjective   Denies shortness of breath.    Review of Systems   Constitutional: Negative for fever.   Respiratory: Negative for cough.    Cardiovascular: Negative for chest pain.   Gastrointestinal: Negative for abdominal pain.     Objective   Temp:  [97.7 °F (36.5 °C)-98.5 °F (36.9 °C)] 97.9 °F (36.6 °C)  Heart Rate:  [69-87] 73  Resp:  [16-20] 20  BP: ()/(59-88) 106/64  SpO2:  [97 %] 97 %  on  Flow (L/min):  [1-2] 2;   Device (Oxygen Therapy): nasal cannula  Body mass index is 20.59 kg/m².    Physical Exam  Vitals signs and nursing note reviewed.   Constitutional:       General: She is not in acute distress.  HENT:      Head: Normocephalic and atraumatic.   Cardiovascular:      Rate and Rhythm: Normal rate. Rhythm irregularly irregular.   Pulmonary:      Effort: Pulmonary effort is normal. No respiratory distress.      Breath sounds: Normal breath sounds.   Abdominal:      General: Bowel sounds are normal.      Palpations: Abdomen is soft.      Tenderness: There is no abdominal tenderness. There is no guarding or rebound.   Skin:     General: Skin is warm and dry.   Neurological:      General: No focal deficit present.      Mental Status: She is alert.   Psychiatric:         Mood and Affect: Mood normal.         Behavior: Behavior normal.     While in the room and during my examination of the patient I wore gloves, mask, eye protection.  I washed my hands before and after this patient encounter.     Reviewed medications and new clinical results    Scheduled Meds  apixaban, 5 mg, Oral, Q12H  clopidogrel, 75 mg, Oral, Daily  metoprolol tartrate, 6.25 mg, Oral, BID  senna, 2 tablet, Oral, Nightly  sodium chloride, 10 mL, Intravenous, Q12H  vitamin B-12, 500 mcg,  Oral, Daily    Continuous Infusions   PRN Meds  lactulose  •  nitroglycerin  •  ondansetron  •  sodium chloride    Results from last 7 days   Lab Units 10/13/20  0439 10/12/20  0622 10/11/20  0559 10/10/20  0419 10/09/20  0550 10/08/20  0647 10/07/20  0952   WBC 10*3/mm3  --  5.55 7.13 6.88 8.96 8.60  8.63 11.78*   HEMOGLOBIN g/dL 8.2* 8.0* 9.4* 8.9* 9.5* 8.9*  8.7* 9.8*   PLATELETS 10*3/mm3  --  278 287 238 239 200  200 228     Results from last 7 days   Lab Units 10/10/20  0419 10/09/20  1426 10/08/20  0446 10/07/20  0952   SODIUM mmol/L 140 140 143 142   POTASSIUM mmol/L 4.1 4.3 5.4* 4.3   CHLORIDE mmol/L 109* 108* 110* 105   CO2 mmol/L 22.6 22.1 21.6* 24.1   BUN mg/dL 30* 30* 31* 26*   CREATININE mg/dL 0.91 0.99 1.06* 0.94   CALCIUM mg/dL 7.8* 8.1* 8.2* 8.8   GLUCOSE mg/dL 77 84 94 118*     Lab Results   Component Value Date    ANIONGAP 8.4 10/10/2020     Estimated Creatinine Clearance: 36 mL/min (by C-G formula based on SCr of 0.91 mg/dL).    Lab Results   Component Value Date    COVID19 Not Detected 10/07/2020     Assessment/Plan   Active Hospital Problems    Diagnosis  POA   • **Pulmonary embolus (CMS/HCC) [I26.99]  Unknown   • Pulmonary emboli (CMS/HCC) [I26.99]  Yes   • DNR (do not resuscitate) [Z66]  Unknown   • Dementia without behavioral disturbance (CMS/HCC) [F03.90]  Unknown   • Pleural effusion on right [J90]  Unknown   • Anemia [D64.9]  Yes   • Atrial fibrillation (CMS/HCC) [I48.91]  Yes   • Essential hypertension [I10]  Yes   • Hyperlipidemia [E78.5]  Unknown      Resolved Hospital Problems    Diagnosis Date Resolved POA   • Hyperkalemia [E87.5] 10/12/2020 Unknown     89 y.o. female with submassive PE, DVT    · Now on Eliquis  · Vascular surgery recommends repeating duplex 6 to 8 weeks  · CAD: Plavix  · Disposition: [facility] awaiting precertification  · Discussed with patient and nursing staff    Flakito Lyons MD   10/13/20  11:15 EDT

## 2020-10-13 NOTE — THERAPY TREATMENT NOTE
Patient Name: Gaby Diaz  : 2/10/1931    MRN: [id number]                              Today's Date: 10/13/2020       Admit Date: 10/7/2020    Visit Dx:     ICD-10-CM ICD-9-CM   1. Acute saddle pulmonary embolism with acute cor pulmonale (CMS/HCC)  I26.02 415.13     415.0     Patient Active Problem List   Diagnosis   • Atrial fibrillation (CMS/HCC)   • Hyperlipidemia   • Essential hypertension   • Intraventricular hemorrhage (CMS/HCC)   • Fall -    • Anemia   • Pulmonary embolus (CMS/HCC)   • DNR (do not resuscitate)   • Dementia without behavioral disturbance (CMS/HCC)   • Pleural effusion on right   • Pulmonary emboli (CMS/HCC)     Past Medical History:   Diagnosis Date   • Arthritis of back    • Arthritis of neck    • Atrial fibrillation (CMS/HCC)    • Hip arthrosis    • Hyperlipidemia    • Hypertension    • Knee swelling    • Myocardial ischemia     inferior wall   • Stroke syndrome      Past Surgical History:   Procedure Laterality Date   • HIP SURGERY     • REPLACEMENT TOTAL KNEE Bilateral      General Information     Row Name 10/13/20 1027          Physical Therapy Time and Intention    Document Type  therapy note (daily note)  -PC     Mode of Treatment  physical therapy  -PC     Row Name 10/13/20 1027          Cognition    Orientation Status (Cognition)  oriented x 3;verbal cues/prompts needed for orientation  -PC     Row Name 10/13/20 1027          Safety Issues, Functional Mobility    Safety Issues Affecting Function (Mobility)  insight into deficits/self-awareness  -PC     Impairments Affecting Function (Mobility)  endurance/activity tolerance;strength  -PC       User Key  (r) = Recorded By, (t) = Taken By, (c) = Cosigned By    Initials Name Provider Type    PC Preeti Francois PT Physical Therapist        Mobility     Row Name 10/13/20 1028          Bed Mobility    Supine-Sit Sharps (Bed Mobility)  contact guard  -PC     Assistive Device (Bed Mobility)  bed rails  -PC     Row Name  10/13/20 1028          Sit-Stand Transfer    Sit-Stand Taylor (Transfers)  minimum assist (75% patient effort)  -PC     Assistive Device (Sit-Stand Transfers)  walker, front-wheeled  -PC     Row Name 10/13/20 1028          Gait/Stairs (Locomotion)    Taylor Level (Gait)  minimum assist (75% patient effort)  -PC     Assistive Device (Gait)  walker, front-wheeled  -PC     Distance in Feet (Gait)  30 ft, gait quality slighty improved today  -PC     Deviations/Abnormal Patterns (Gait)  misael decreased;gait speed decreased;festinating/shuffling  -PC     Bilateral Gait Deviations  forward flexed posture;heel strike decreased  -PC     Left Sided Gait Deviations  Trendelenburg sign  -PC     Right Sided Gait Deviations  Trendelenburg sign  -PC       User Key  (r) = Recorded By, (t) = Taken By, (c) = Cosigned By    Initials Name Provider Type    PC Preeti Francois, PT Physical Therapist        Obj/Interventions     Row Name 10/13/20 1029          Motor Skills    Therapeutic Exercise  -- AP, LAQ, shoulder flexion 10 reps  -PC       User Key  (r) = Recorded By, (t) = Taken By, (c) = Cosigned By    Initials Name Provider Type    PC Preeti Francois, PT Physical Therapist        Goals/Plan    No documentation.       Clinical Impression     Row Name 10/13/20 1030          Pain Scale: Numbers Pre/Post-Treatment    Pretreatment Pain Rating  0/10 - no pain  -     Row Name 10/13/20 1030          Plan of Care Review    Plan of Care Reviewed With  patient  -PC     Progress  improving  -PC     Outcome Summary  pt showing progress with functional mobility with less assist required for bed mobility, standing and gait, and able to walk with improved activity tolerance with O2 sats 98% on 2l with less SOA  -PC     Row Name 10/13/20 1030          Positioning and Restraints    Pre-Treatment Position  in bed  -PC     Post Treatment Position  chair  -PC     In Chair  reclined;call light within reach;encouraged to call for  assist;exit alarm on  -PC       User Key  (r) = Recorded By, (t) = Taken By, (c) = Cosigned By    Initials Name Provider Type    PC Preeti Francois PT Physical Therapist        Outcome Measures     Row Name 10/13/20 1032          How much help from another person do you currently need...    Turning from your back to your side while in flat bed without using bedrails?  3  -PC     Moving from lying on back to sitting on the side of a flat bed without bedrails?  3  -PC     Moving to and from a bed to a chair (including a wheelchair)?  3  -PC     Standing up from a chair using your arms (e.g., wheelchair, bedside chair)?  3  -PC     Climbing 3-5 steps with a railing?  2  -PC     To walk in hospital room?  3  -PC     AM-PAC 6 Clicks Score (PT)  17  -PC       User Key  (r) = Recorded By, (t) = Taken By, (c) = Cosigned By    Initials Name Provider Type    PC Preeti Francois, PT Physical Therapist        Physical Therapy Education                 Title: PT OT SLP Therapies (Done)     Topic: Physical Therapy (Done)     Point: Mobility training (Done)     Learning Progress Summary           Patient Acceptance, E,D, DU by PC at 10/13/2020 1032    Acceptance, E,D, DU,NR by PC at 10/12/2020 1054    Acceptance, E, NR by AR at 10/9/2020 1547                   Point: Home exercise program (Done)     Learning Progress Summary           Patient Acceptance, E,D, DU by PC at 10/13/2020 1032    Acceptance, E, NR by AR at 10/9/2020 1547                   Point: Body mechanics (Done)     Learning Progress Summary           Patient Acceptance, E,D, DU by PC at 10/13/2020 1032    Acceptance, E,D, DU,NR by PC at 10/12/2020 1054    Acceptance, E, NR by AR at 10/9/2020 1547                   Point: Precautions (Done)     Learning Progress Summary           Patient Acceptance, E,D, DU by PC at 10/13/2020 1032    Acceptance, E,D, DU,NR by PC at 10/12/2020 1054    Acceptance, E, NR by AR at 10/9/2020 1547                               User Key      Initials Effective Dates Name Provider Type Discipline    PC 04/03/18 -  Preeti Francois PT Physical Therapist PT    AR 04/03/18 -  Kristan Wiggins PT Physical Therapist PT              PT Recommendation and Plan     Plan of Care Reviewed With: patient  Progress: improving  Outcome Summary: pt showing progress with functional mobility with less assist required for bed mobility, standing and gait, and able to walk with improved activity tolerance with O2 sats 98% on 2l with less SOA     Time Calculation:   PT Charges     Row Name 10/13/20 1033             Time Calculation    Start Time  1000  -PC      Stop Time  1024  -PC      Time Calculation (min)  24 min  -PC      PT Received On  10/13/20  -PC      PT - Next Appointment  10/14/20  -PC        User Key  (r) = Recorded By, (t) = Taken By, (c) = Cosigned By    Initials Name Provider Type    PC Preeti Francois, PT Physical Therapist        Therapy Charges for Today     Code Description Service Date Service Provider Modifiers Qty    92755843133 HC PT THER PROC EA 15 MIN 10/12/2020 Preeti Francois, PT GP 1    10633027530 HC PT THER SUPP EA 15 MIN 10/12/2020 Preeti Francois, PT GP 1    94018564430 HC PT THER PROC EA 15 MIN 10/13/2020 Preeti Francois, PT GP 2          PT G-Codes  Outcome Measure Options: AM-PAC 6 Clicks Basic Mobility (PT)  AM-PAC 6 Clicks Score (PT): 17    Preeti Francois PT  10/13/2020

## 2020-10-14 PROCEDURE — 97110 THERAPEUTIC EXERCISES: CPT

## 2020-10-14 RX ORDER — CLOPIDOGREL BISULFATE 75 MG/1
75 TABLET ORAL DAILY
Qty: 30 TABLET
Start: 2020-10-15

## 2020-10-14 RX ADMIN — SENNOSIDES 2 TABLET: 8.6 TABLET, FILM COATED ORAL at 20:18

## 2020-10-14 RX ADMIN — SODIUM CHLORIDE, PRESERVATIVE FREE 10 ML: 5 INJECTION INTRAVENOUS at 20:26

## 2020-10-14 RX ADMIN — SODIUM CHLORIDE, PRESERVATIVE FREE 10 ML: 5 INJECTION INTRAVENOUS at 08:55

## 2020-10-14 RX ADMIN — APIXABAN 5 MG: 5 TABLET, FILM COATED ORAL at 20:18

## 2020-10-14 RX ADMIN — METOPROLOL TARTRATE 6.25 MG: 25 TABLET, FILM COATED ORAL at 08:54

## 2020-10-14 RX ADMIN — CLOPIDOGREL 75 MG: 75 TABLET, FILM COATED ORAL at 08:55

## 2020-10-14 RX ADMIN — APIXABAN 5 MG: 5 TABLET, FILM COATED ORAL at 08:53

## 2020-10-14 RX ADMIN — Medication 500 MCG: at 08:54

## 2020-10-14 RX ADMIN — METOPROLOL TARTRATE 6.25 MG: 25 TABLET, FILM COATED ORAL at 20:21

## 2020-10-14 NOTE — PLAN OF CARE
Problem: Adult Inpatient Plan of Care  Goal: Plan of Care Review  Recent Flowsheet Documentation  Taken 10/14/2020 1026 by Preeti Francois, PT  Plan of Care Reviewed With: patient  Outcome Summary: pt showing improvement each day with mobility, good effort with activity, cont to present with weakness, dec endurance with fatiguing with activity, pt is a high fall risk, she was able to walk farther today, gait cont to be forward flexed, difficulty stepping with RLE   Patient was intermittently wearing a face mask during this therapy encounter. Therapist used appropriate personal protective equipment including eye protection, mask, and gloves.  Mask used was standard procedure mask. Appropriate PPE was worn during the entire therapy session. Hand hygiene was completed before and after therapy session. Patient is not in enhanced droplet precautions.

## 2020-10-14 NOTE — PLAN OF CARE
Goal Outcome Evaluation:  Plan of Care Reviewed With: patient  Progress: improving  Outcome Summary: ambulate in room with some weakness and  labored breathing noted. resolved at rest. confused and forgetful. No skin breakdown noted. tolerating eliquis without signs bleeding. No falls or injury.

## 2020-10-14 NOTE — PROGRESS NOTES
"Physicians Statement of Medical Necessity for  Ambulance Transportation    GENERAL INFORMATION     Name: Gaby Diaz  YOB: 1931  Medicare #: [id number]  Transport Date:                      (Valid for round trips this date, or for scheduled repetitive trips for 60 days from the date signed below.)  Origin: [facility] Destination: [place]  Is the Patient's stay covered under Medicare Part A (PPS/DRG?)Yes   Closest appropriate facility? Yes  If this a hosp-hosp transfer? No  Is this a hospice patient? No    MEDICAL NECESSITY QUESTIONAIRE    Ambulance Transportation is medically necessary only if other means of transportation are contraindicated or would be potentially harmful to the patient.  To meet this requirement, the patient must be either \"bed confined\" or suffer from a condition such that transport by means other than an ambulance is contraindicated by the patient's condition.  The following questions must be answered by the healthcare professional signing below for this form to be valid:     1) Describe the MEDICAL CONDITION (physical and/or mental) of this patient AT THE TIME OF AMBULANCE TRANSPORT that requires the patient to be transported in an ambulance, and why transport by other means is contraindicated by the patient's condition: bed confined, 2L of O2 use, and confused  Past Medical History:   Diagnosis Date   • Arthritis of back    • Arthritis of neck    • Atrial fibrillation (CMS/HCC)    • Hip arthrosis    • Hyperlipidemia    • Hypertension    • Knee swelling    • Myocardial ischemia     inferior wall   • Stroke syndrome       Past Surgical History:   Procedure Laterality Date   • HIP SURGERY     • REPLACEMENT TOTAL KNEE Bilateral       2) Is this patient \"bed confined\" as defined below?Yes    To be \"bed confined\" the patient must satisfy all three of the following criteria:  (1) unable to get up from bed without assistance; AND (2) unable to ambulate;  AND (3) unable to " sit in a chair or wheelchair.  3) Can this patient safely be transported by car or wheelchair van (I.e., may safely sit during transport, without an attendant or monitoring?)No   4. In addition to completing questions 1-3 above, please check any of the following conditions that apply*:          *Note: supporting documentation for any boxes checked must be maintained in the patient's medical records Patient is confused, Moderate/severe pain on movement, Medical attendant required and Requires oxygen - unable to self administer      SIGNATURE OF PHYSICIAN OR OTHER AUTHORIZED HEALTHCARE PROFESSIONAL    I certify that the above information is true and correct based on my evaluation of this patient, and represent that the patient requires transport by ambulance and that other forms of transport are contraindicated.  I understand that this information will be used by the Centers for Medicare and Medicaid Services (CMS) to support the determiniation of medical necessity for ambulance services, and I represent that I have personal knowledge of the patient's condition at the time of transport.    X   If this box is checked, I also certify that the patient is physically or mentally incapable of signing the ambulance service's claim form and that the institution with which I am affiliated has furnished care, services or assistance to the patient.  My signature below is made on behalf of the patient pursuant to 42 .36(b)(4). In accordance with 42 .37, the specific reason(s) that the patient is physically or mentally incapable of signing the claim for is as follows: confused    Signature of Physician or Healthcare Professional  Date/Time:   10/14/2020 @ 4:04pm     (For Scheduled repetitive transport, this form is not valid for transports performed more than 60 days after this date).      Shannon Epley RN Case Manager                                                                                                                                       --------------------------------------------------------------------------------------------  Printed Name and Credentials of Physician or Authorized Healthcare Professional     *Form must be signed by patient's attending physician for scheduled, repetitive transports,.  For non-repetitive ambulance transports, if unable to obtain the signature of the attending physician, any of the following may sign (please select below):     Physician  Clinical Nurse Specialist  Registered Nurse     Physician Assistant  Discharge Planner  Licensed Practical Nurse     Nurse Practitioner

## 2020-10-14 NOTE — THERAPY TREATMENT NOTE
Patient Name: Gaby Diaz  : 2/10/1931    MRN: [id number]                              Today's Date: 10/14/2020       Admit Date: 10/7/2020    Visit Dx:     ICD-10-CM ICD-9-CM   1. Acute saddle pulmonary embolism with acute cor pulmonale (CMS/HCC)  I26.02 415.13     415.0     Patient Active Problem List   Diagnosis   • Atrial fibrillation (CMS/HCC)   • Hyperlipidemia   • Essential hypertension   • Intraventricular hemorrhage (CMS/HCC)   • Fall -    • Anemia   • Pulmonary embolus (CMS/HCC)   • DNR (do not resuscitate)   • Dementia without behavioral disturbance (CMS/HCC)   • Pleural effusion on right   • Pulmonary emboli (CMS/HCC)     Past Medical History:   Diagnosis Date   • Arthritis of back    • Arthritis of neck    • Atrial fibrillation (CMS/HCC)    • Hip arthrosis    • Hyperlipidemia    • Hypertension    • Knee swelling    • Myocardial ischemia     inferior wall   • Stroke syndrome      Past Surgical History:   Procedure Laterality Date   • HIP SURGERY     • REPLACEMENT TOTAL KNEE Bilateral      General Information     Row Name 10/14/20 1024          Physical Therapy Time and Intention    Document Type  therapy note (daily note)  -PC     Mode of Treatment  physical therapy  -PC     Row Name 10/14/20 1024          Cognition    Orientation Status (Cognition)  oriented x 3;verbal cues/prompts needed for orientation  -PC     Row Name 10/14/20 1024          Safety Issues, Functional Mobility    Impairments Affecting Function (Mobility)  endurance/activity tolerance;strength  -PC       User Key  (r) = Recorded By, (t) = Taken By, (c) = Cosigned By    Initials Name Provider Type    PC Preeti Francois PT Physical Therapist        Mobility     Row Name 10/14/20 1024          Bed Mobility    Comment (Bed Mobility)  in chair  -PC     Row Name 10/14/20 1024          Transfers    Comment (Transfers)  min assist from cahir, mod assist from low toilet seat  -PC     Row Name 10/14/20 1024          Sit-Stand  Transfer    Sit-Stand Rice (Transfers)  minimum assist (75% patient effort);moderate assist (50% patient effort)  -     Assistive Device (Sit-Stand Transfers)  walker, front-wheeled  -PC     Row Name 10/14/20 1024          Gait/Stairs (Locomotion)    Rice Level (Gait)  minimum assist (75% patient effort)  -PC     Assistive Device (Gait)  walker, front-wheeled  -PC     Distance in Feet (Gait)  30 ft, seated rest, 10 ft, sat on toilet, tehn 20 ft farther back to chair  -PC     Deviations/Abnormal Patterns (Gait)  festinating/shuffling;stride length decreased  -PC     Bilateral Gait Deviations  forward flexed posture;heel strike decreased  -PC     Left Sided Gait Deviations  Trendelenburg sign  -PC     Right Sided Gait Deviations  Trendelenburg sign  -PC       User Key  (r) = Recorded By, (t) = Taken By, (c) = Cosigned By    Initials Name Provider Type    Preeti Edgar, PT Physical Therapist        Obj/Interventions     Row Name 10/14/20 1026          Motor Skills    Therapeutic Exercise  -- AP, LAQ while seated, 10 reps  -     Row Name 10/14/20 1026          Balance    Comment, Balance  stood 304 minutes to get cleaned up and brief back on  -PC       User Key  (r) = Recorded By, (t) = Taken By, (c) = Cosigned By    Initials Name Provider Type    Preeti Edgar, PT Physical Therapist        Goals/Plan    No documentation.       Clinical Impression     Row Name 10/14/20 1026          Plan of Care Review    Plan of Care Reviewed With  patient  -PC     Outcome Summary  pt showing improvement each day with mobility, good effort with activity, cont to present with weakness, dec endurance with fatiguing with activity, pt is a high fall risk, she was able to walk farther today, gait cont to be forward flexed, difficulty stepping with RLE  -PC     Row Name 10/14/20 1026          Positioning and Restraints    Pre-Treatment Position  sitting in chair/recliner  -PC     Post Treatment Position  chair   -PC     In Chair  reclined;call light within reach;encouraged to call for assist;exit alarm on  -PC       User Key  (r) = Recorded By, (t) = Taken By, (c) = Cosigned By    Initials Name Provider Type    PC Preeti Francois PT Physical Therapist        Outcome Measures     Row Name 10/14/20 1029          How much help from another person do you currently need...    Turning from your back to your side while in flat bed without using bedrails?  3  -PC     Moving from lying on back to sitting on the side of a flat bed without bedrails?  3  -PC     Moving to and from a bed to a chair (including a wheelchair)?  3  -PC     Standing up from a chair using your arms (e.g., wheelchair, bedside chair)?  3  -PC     Climbing 3-5 steps with a railing?  2  -PC     To walk in hospital room?  3  -PC     AM-PAC 6 Clicks Score (PT)  17  -PC       User Key  (r) = Recorded By, (t) = Taken By, (c) = Cosigned By    Initials Name Provider Type    PC Preeti Francois PT Physical Therapist        Physical Therapy Education                 Title: PT OT SLP Therapies (Done)     Topic: Physical Therapy (Done)     Point: Mobility training (Done)     Learning Progress Summary           Patient Acceptance, E,D, DU by PC at 10/14/2020 1030    Acceptance, E,D, DU by PC at 10/13/2020 1032    Acceptance, E,D, DU,NR by PC at 10/12/2020 1054    Acceptance, E, NR by AR at 10/9/2020 1547                   Point: Home exercise program (Done)     Learning Progress Summary           Patient Acceptance, E,D, DU by PC at 10/14/2020 1030    Acceptance, E,D, DU by PC at 10/13/2020 1032    Acceptance, E, NR by AR at 10/9/2020 1547                   Point: Body mechanics (Done)     Learning Progress Summary           Patient Acceptance, E,D, DU by PC at 10/14/2020 1030    Acceptance, E,D, DU by PC at 10/13/2020 1032    Acceptance, E,D, DU,NR by PC at 10/12/2020 1054    Acceptance, E, NR by AR at 10/9/2020 1547                   Point: Precautions (Done)      Learning Progress Summary           Patient Acceptance, E,D, DU by PC at 10/14/2020 1030    Acceptance, E,D, DU by PC at 10/13/2020 1032    Acceptance, E,D, DU,NR by PC at 10/12/2020 1054    Acceptance, E, NR by AR at 10/9/2020 1547                               User Key     Initials Effective Dates Name Provider Type Discipline     04/03/18 -  Preeti Francois, PT Physical Therapist PT    AR 04/03/18 -  Kristan Wiggins PT Physical Therapist PT              PT Recommendation and Plan     Plan of Care Reviewed With: patient  Progress: improving  Outcome Summary: pt showing improvement each day with mobility, good effort with activity, cont to present with weakness, dec endurance with fatiguing with activity, pt is a high fall risk, she was able to walk farther today, gait cont to be forward flexed, difficulty stepping with RLE     Time Calculation:   PT Charges     Row Name 10/14/20 1030             Time Calculation    Start Time  0842  -PC      Stop Time  0906  -PC      Time Calculation (min)  24 min  -PC      PT Received On  10/14/20  -PC      PT - Next Appointment  10/15/20  -PC        User Key  (r) = Recorded By, (t) = Taken By, (c) = Cosigned By    Initials Name Provider Type    PC Preeti Francois, PT Physical Therapist        Therapy Charges for Today     Code Description Service Date Service Provider Modifiers Qty    11005630478 HC PT THER PROC EA 15 MIN 10/13/2020 Preeti Francois, PT GP 2    02103680099 HC PT THER PROC EA 15 MIN 10/14/2020 Preeti Francois, PT GP 2          PT G-Codes  Outcome Measure Options: AM-PAC 6 Clicks Basic Mobility (PT)  AM-PAC 6 Clicks Score (PT): 17    Preeti Francois PT  10/14/2020

## 2020-10-14 NOTE — PLAN OF CARE
Problem: Adult Inpatient Plan of Care  Goal: Plan of Care Review  Flowsheets  Taken 10/14/2020 0438 by Gomez Mejia, RN  Outcome Summary: VSS. No acute episodes overnight. Will continue plan of care.

## 2020-10-14 NOTE — PROGRESS NOTES
Continued Stay Note  [facility]     Patient Name: Gaby Diaz  MRN: [id number]  Today's Date: 10/14/2020    Admit Date: 10/7/2020    Discharge Plan     Row Name 10/14/20 1649       Plan    Plan  [facility] vs. LTC    Plan Comments  Talked to Antoine/Parul and they were unable to talk to the family about payment today and cannot take the patient tonight because they have too many admits.  They said they will take her tomorrow.  I called and cancelled [facility] for 9pm tonight and will call in the morning to schedule.    Row Name 10/14/20 0177       Plan    Plan  [facility] vs. LTC    Patient/Family in Agreement with Plan  yes    Plan Comments  Have talked to Parul and still no answer on the appeal.  Asked if there is any way to send the patient to [facility] today and then wait on the appeal decision there, since she will be going to LTC there if the appeal fails.  They are trying to work it out with the patients family and have them private pay while awaiting the appeal.  I talked to one of the daughters and asked if they had a plan if this appeal takes a few days and she said they want her to go to [facility].  I explained that she is discharged from the hospital but since they are appealing [facility] had to work out with the family and payment.  I called and left a message for Parul to let me know something before 5pm.  I have [facility] scheduled for her tonight at 9pm if they can come up with an arrangement.        Discharge Codes    No documentation.       Expected Discharge Date and Time     Expected Discharge Date Expected Discharge Time    Oct 14, 2020             Shannon Epley, RN

## 2020-10-14 NOTE — PROGRESS NOTES
[facility]               ASSOCIATES     LOS: 7 days     Name: Gaby Diaz  Age: 89 y.o.  Sex: female  :  2/10/1931  MRN: [id number]         Primary Care Physician: Arsh Ford MD    Diet Regular; Cardiac    Subjective   Denies shortness of breath.    Review of Systems   Constitutional: Negative for fever.   Respiratory: Negative for cough.    Cardiovascular: Negative for chest pain.   Gastrointestinal: Negative for abdominal pain.     Objective   Temp:  [97.1 °F (36.2 °C)-98.4 °F (36.9 °C)] 98.4 °F (36.9 °C)  Heart Rate:  [73-78] 78  Resp:  [18] 18  BP: ()/(64-84) 135/84  SpO2:  [93 %-98 %] 93 %  on  Flow (L/min):  [2] 2;   Device (Oxygen Therapy): nasal cannula  Body mass index is 20.59 kg/m².    Physical Exam  Vitals signs and nursing note reviewed.   Constitutional:       General: She is not in acute distress.  HENT:      Head: Normocephalic and atraumatic.   Cardiovascular:      Rate and Rhythm: Normal rate. Rhythm irregularly irregular.   Pulmonary:      Effort: Pulmonary effort is normal. No respiratory distress.      Breath sounds: Normal breath sounds.   Abdominal:      General: Bowel sounds are normal.      Palpations: Abdomen is soft.      Tenderness: There is no abdominal tenderness. There is no guarding or rebound.   Skin:     General: Skin is warm and dry.   Neurological:      General: No focal deficit present.      Mental Status: She is alert.   Psychiatric:         Mood and Affect: Mood normal.         Behavior: Behavior normal.     While in the room and during my examination of the patient I wore gloves, mask, eye protection.  I washed my hands before and after this patient encounter.     Reviewed medications and new clinical results    Scheduled Meds  apixaban, 5 mg, Oral, Q12H  clopidogrel, 75 mg, Oral, Daily  metoprolol tartrate, 6.25 mg, Oral, BID  senna, 2 tablet, Oral, Nightly  sodium chloride, 10 mL, Intravenous, Q12H  vitamin B-12, 500 mcg,  Oral, Daily    Continuous Infusions   PRN Meds  lactulose  •  nitroglycerin  •  ondansetron  •  sodium chloride    Results from last 7 days   Lab Units 10/13/20  0439 10/12/20  0622 10/11/20  0559 10/10/20  0419 10/09/20  0550 10/08/20  0647   WBC 10*3/mm3  --  5.55 7.13 6.88 8.96 8.60  8.63   HEMOGLOBIN g/dL 8.2* 8.0* 9.4* 8.9* 9.5* 8.9*  8.7*   PLATELETS 10*3/mm3  --  278 287 238 239 200  200     Results from last 7 days   Lab Units 10/10/20  0419 10/09/20  1426 10/08/20  0446   SODIUM mmol/L 140 140 143   POTASSIUM mmol/L 4.1 4.3 5.4*   CHLORIDE mmol/L 109* 108* 110*   CO2 mmol/L 22.6 22.1 21.6*   BUN mg/dL 30* 30* 31*   CREATININE mg/dL 0.91 0.99 1.06*   CALCIUM mg/dL 7.8* 8.1* 8.2*   GLUCOSE mg/dL 77 84 94     Lab Results   Component Value Date    ANIONGAP 8.4 10/10/2020     Estimated Creatinine Clearance: 36 mL/min (by C-G formula based on SCr of 0.91 mg/dL).    Lab Results   Component Value Date    COVID19 Not Detected 10/07/2020     Assessment/Plan   Active Hospital Problems    Diagnosis  POA   • **Pulmonary embolus (CMS/HCC) [I26.99]  Unknown   • Pulmonary emboli (CMS/HCC) [I26.99]  Yes   • DNR (do not resuscitate) [Z66]  Unknown   • Dementia without behavioral disturbance (CMS/HCC) [F03.90]  Unknown   • Pleural effusion on right [J90]  Unknown   • Anemia [D64.9]  Yes   • Atrial fibrillation (CMS/HCC) [I48.91]  Yes   • Essential hypertension [I10]  Yes   • Hyperlipidemia [E78.5]  Unknown      Resolved Hospital Problems    Diagnosis Date Resolved POA   • Hyperkalemia [E87.5] 10/12/2020 Unknown     89 y.o. female with submassive PE, DVT    · Stable on Eliquis  · Vascular surgery recommends repeating duplex 6 to 8 weeks  · CAD: Plavix  · Disposition: [facility] vs long term. family has appealed insurance decision to not cover skilled.  · Discussed with patient and nursing staff    Flakito Lyons MD   10/14/20  09:55 EDT

## 2020-10-15 VITALS
HEART RATE: 76 BPM | TEMPERATURE: 98.4 F | OXYGEN SATURATION: 97 % | HEIGHT: 64 IN | SYSTOLIC BLOOD PRESSURE: 124 MMHG | RESPIRATION RATE: 18 BRPM | DIASTOLIC BLOOD PRESSURE: 77 MMHG | WEIGHT: 120 LBS | BODY MASS INDEX: 20.49 KG/M2

## 2020-10-15 PROCEDURE — 97110 THERAPEUTIC EXERCISES: CPT

## 2020-10-15 RX ADMIN — APIXABAN 5 MG: 5 TABLET, FILM COATED ORAL at 08:18

## 2020-10-15 RX ADMIN — CLOPIDOGREL 75 MG: 75 TABLET, FILM COATED ORAL at 08:18

## 2020-10-15 RX ADMIN — SODIUM CHLORIDE, PRESERVATIVE FREE 10 ML: 5 INJECTION INTRAVENOUS at 08:18

## 2020-10-15 RX ADMIN — METOPROLOL TARTRATE 6.25 MG: 25 TABLET, FILM COATED ORAL at 08:18

## 2020-10-15 RX ADMIN — Medication 500 MCG: at 08:18

## 2020-10-15 NOTE — PLAN OF CARE
Problem: Adult Inpatient Plan of Care  Goal: Plan of Care Review  Outcome: Met  Flowsheets (Taken 10/15/2020 1046)  Progress: improving  Plan of Care Reviewed With: patient  Outcome Summary: Pt feeling much better. Sat up in chair this am. Denies any shortness of breath. Pt continues on Eliquis and Plavix. Pt ready for discharge to [facility]. Daughter to transport pt.   Goal Outcome Evaluation:  Plan of Care Reviewed With: patient  Progress: improving  Outcome Summary: Pt feeling much better. Sat up in chair this am. Denies any shortness of breath. Pt continues on Eliquis and Plavix. Pt ready for discharge to [facility]. Daughter to transport pt.

## 2020-10-15 NOTE — PROGRESS NOTES
Continued Stay Note  [facility]     Patient Name: Gaby Diaz  MRN: [id number]  Today's Date: 10/15/2020    Admit Date: 10/7/2020    Discharge Plan     Row Name 10/15/20 1352       Plan    Plan  [facility]    Patient/Family in Agreement with Plan  yes    Plan Comments  Patient discharging today to [place] and family is now wanting to take her.  I called and lm with Antoine letting them know she will be coming in the next couple hours.        Discharge Codes    No documentation.       Expected Discharge Date and Time     Expected Discharge Date Expected Discharge Time    Oct 14, 2020             Shannon Epley, RN

## 2020-10-15 NOTE — PLAN OF CARE
Plan of Care Reviewed With: patient  Progress: no change  Outcome Summary: Pt slept peacefully overnight. VSS. No complaints of pain. Will continue to monitor.

## 2020-10-15 NOTE — PROGRESS NOTES
Case Management Discharge Note      Final Note: Discharge to [facility]    Provided Post Acute Provider List?: N/A  Provided Post Acute Provider Quality & Resource List?: Refused  Refused Quality and Resource List Comment: Plan is to return to [facility].    Selected Continued Care - Discharged on 10/15/2020 Admission date: 10/7/2020 - Discharge disposition: Skilled Nursing Facility (DC - External)    Destination    No services have been selected for the patient.              Durable Medical Equipment    No services have been selected for the patient.              Dialysis/Infusion    No services have been selected for the patient.              Home Medical Care    No services have been selected for the patient.              Therapy    No services have been selected for the patient.              Community Resources    No services have been selected for the patient.                Selected Continued Care - Prior Encounters Includes selections from prior encounters from 7/9/2020 to 10/15/2020    Discharged on 9/21/2020 Admission date: 9/18/2020 - Discharge disposition: Skilled Nursing Facility (DC - External)    Destination     Service Provider Selected Services Address Phone Fax    [facility] [address] [address] [phone]                    Transportation Services  Private: Car    Final Discharge Disposition Code: 04 - intermediate care facility

## 2020-10-15 NOTE — THERAPY TREATMENT NOTE
Patient Name: Gaby Diaz  : 2/10/1931    MRN: [id number]                              Today's Date: 10/15/2020       Admit Date: 10/7/2020    Visit Dx:     ICD-10-CM ICD-9-CM   1. Acute saddle pulmonary embolism with acute cor pulmonale (CMS/HCC)  I26.02 415.13     415.0     Patient Active Problem List   Diagnosis   • Atrial fibrillation (CMS/HCC)   • Hyperlipidemia   • Essential hypertension   • Intraventricular hemorrhage (CMS/HCC)   • Fall -    • Anemia   • Pulmonary embolus (CMS/HCC)   • DNR (do not resuscitate)   • Dementia without behavioral disturbance (CMS/HCC)   • Pleural effusion on right   • Pulmonary emboli (CMS/HCC)     Past Medical History:   Diagnosis Date   • Arthritis of back    • Arthritis of neck    • Atrial fibrillation (CMS/HCC)    • Hip arthrosis    • Hyperlipidemia    • Hypertension    • Knee swelling    • Myocardial ischemia     inferior wall   • Stroke syndrome      Past Surgical History:   Procedure Laterality Date   • HIP SURGERY     • REPLACEMENT TOTAL KNEE Bilateral      General Information     Row Name 10/15/20 1058          Physical Therapy Time and Intention    Document Type  therapy note (daily note)  -PC     Mode of Treatment  physical therapy  -PC     Row Name 10/15/20 1058          General Information    Existing Precautions/Restrictions  fall;oxygen therapy device and L/min  -PC     Row Name 10/15/20 1058          Cognition    Orientation Status (Cognition)  oriented x 3  -PC     Row Name 10/15/20 1058          Safety Issues, Functional Mobility    Safety Issues Affecting Function (Mobility)  insight into deficits/self-awareness  -PC     Impairments Affecting Function (Mobility)  endurance/activity tolerance;strength  -PC       User Key  (r) = Recorded By, (t) = Taken By, (c) = Cosigned By    Initials Name Provider Type    PC Preeti Francois PT Physical Therapist        Mobility     Row Name 10/15/20 1050          Bed Mobility    Comment (Bed Mobility)  in  chair  -PC     Row Name 10/15/20 1058          Sit-Stand Transfer    Sit-Stand [place] (Transfers)  minimum assist (75% patient effort)  -PC     Assistive Device (Sit-Stand Transfers)  walker, front-wheeled  -PC     Row Name 10/15/20 1058          Gait/Stairs (Locomotion)    [place] Level (Gait)  minimum assist (75% patient effort)  -PC     Assistive Device (Gait)  walker, front-wheeled  -PC     Distance in Feet (Gait)  50 ft  -PC     Deviations/Abnormal Patterns (Gait)  ataxic;misael decreased;gait speed decreased;stride length decreased  -PC     Bilateral Gait Deviations  forward flexed posture;heel strike decreased  -PC     Left Sided Gait Deviations  Trendelenburg sign  -PC       User Key  (r) = Recorded By, (t) = Taken By, (c) = Cosigned By    Initials Name Provider Type    PC Preeti Francois, PT Physical Therapist        Obj/Interventions     Row Name 10/15/20 1059          Motor Skills    Therapeutic Exercise  -- 10 reps AP, LAQ, hip flexion, shoulder flexion  -PC       User Key  (r) = Recorded By, (t) = Taken By, (c) = Cosigned By    Initials Name Provider Type    PC Preeti Francois, PT Physical Therapist        Goals/Plan    No documentation.       Clinical Impression     Row Name 10/15/20 1100          Pain Scale: Numbers Pre/Post-Treatment    Pretreatment Pain Rating  0/10 - no pain  -     Row Name 10/15/20 1100          Plan of Care Review    Plan of Care Reviewed With  patient  -PC     Progress  improving  -PC     Outcome Summary  pt with good effort, still very weak, fatigues quickly with SOA, pt amb with min assist with rwx, poor gait quality, high fall risk, plans to d/c to [facility]  -     Row Name 10/15/20 1100          Positioning and Restraints    Pre-Treatment Position  sitting in chair/recliner  -PC     Post Treatment Position  chair  -PC     In Chair  reclined;call light within reach;encouraged to call for assist;exit alarm on  -PC       User Key  (r) = Recorded By, (t) =  Taken By, (c) = Cosigned By    Initials Name Provider Type    PC Preeti Francois, PT Physical Therapist        Outcome Measures     Row Name 10/15/20 1102          How much help from another person do you currently need...    Turning from your back to your side while in flat bed without using bedrails?  3  -PC     Moving from lying on back to sitting on the side of a flat bed without bedrails?  3  -PC     Moving to and from a bed to a chair (including a wheelchair)?  3  -PC     Standing up from a chair using your arms (e.g., wheelchair, bedside chair)?  3  -PC     Climbing 3-5 steps with a railing?  2  -PC     To walk in hospital room?  3  -PC     AM-PAC 6 Clicks Score (PT)  17  -PC       User Key  (r) = Recorded By, (t) = Taken By, (c) = Cosigned By    Initials Name Provider Type    PC Preeti Francois, PT Physical Therapist        Physical Therapy Education                 Title: PT OT SLP Therapies (Done)     Topic: Physical Therapy (Done)     Point: Mobility training (Done)     Learning Progress Summary           Patient Acceptance, E,D, DU by PC at 10/15/2020 1102    Acceptance, E,D, DU by PC at 10/14/2020 1030    Acceptance, E,D, DU by PC at 10/13/2020 1032    Acceptance, E,D, DU,NR by PC at 10/12/2020 1054    Acceptance, E, NR by AR at 10/9/2020 1547                   Point: Home exercise program (Done)     Learning Progress Summary           Patient Acceptance, E,D, DU by PC at 10/15/2020 1102    Acceptance, E,D, DU by PC at 10/14/2020 1030    Acceptance, E,D, DU by PC at 10/13/2020 1032    Acceptance, E, NR by AR at 10/9/2020 1547                   Point: Body mechanics (Done)     Learning Progress Summary           Patient Acceptance, E,D, DU by PC at 10/15/2020 1102    Acceptance, E,D, DU by PC at 10/14/2020 1030    Acceptance, E,D, DU by PC at 10/13/2020 1032    Acceptance, E,D, DU,NR by PC at 10/12/2020 1054    Acceptance, E, NR by AR at 10/9/2020 1547                   Point: Precautions (Done)      Learning Progress Summary           Patient Acceptance, E,D, DU by PC at 10/15/2020 1102    Acceptance, E,D, DU by PC at 10/14/2020 1030    Acceptance, E,D, DU by PC at 10/13/2020 1032    Acceptance, E,D, DU,NR by PC at 10/12/2020 1054    Acceptance, E, NR by AR at 10/9/2020 1547                               User Key     Initials Effective Dates Name Provider Type Discipline    PC 04/03/18 -  Preeti Francois, PT Physical Therapist PT    AR 04/03/18 -  Kristan Wiggins, PT Physical Therapist PT              PT Recommendation and Plan     Plan of Care Reviewed With: patient  Progress: improving  Outcome Summary: pt with good effort, still very weak, fatigues quickly with SOA, pt amb with min assist with rwx, poor gait quality, high fall risk, plans to d/c to [facility]     Time Calculation:   PT Charges     Row Name 10/15/20 1103             Time Calculation    Start Time  0852  -PC      Stop Time  0915  -PC      Time Calculation (min)  23 min  -PC      PT Received On  10/15/20  -PC      PT - Next Appointment  10/16/20  -PC        User Key  (r) = Recorded By, (t) = Taken By, (c) = Cosigned By    Initials Name Provider Type    PC Preeti Francois, PT Physical Therapist        Therapy Charges for Today     Code Description Service Date Service Provider Modifiers Qty    10367592528 HC PT THER PROC EA 15 MIN 10/14/2020 Preeti Francois, PT GP 2    80286732085 HC PT THER PROC EA 15 MIN 10/15/2020 Preeti Francois, PT GP 2          PT G-Codes  Outcome Measure Options: AM-PAC 6 Clicks Basic Mobility (PT)  AM-PAC 6 Clicks Score (PT): 17    Preeti Francois, PT  10/15/2020

## 2020-10-15 NOTE — PLAN OF CARE
Problem: Adult Inpatient Plan of Care  Goal: Plan of Care Review  Recent Flowsheet Documentation  Taken 10/15/2020 1100 by Preeti Francois PT  Progress: improving  Plan of Care Reviewed With: patient  Outcome Summary: pt with good effort, still very weak, fatigues quickly with SOA, pt amb with min assist with rwx, poor gait quality, high fall risk, plans to d/c to [place] today   Patient was intermittently wearing a face mask during this therapy encounter. Therapist used appropriate personal protective equipment including eye protection, mask, and gloves.  Mask used was standard procedure mask. Appropriate PPE was worn during the entire therapy session. Hand hygiene was completed before and after therapy session. Patient is not in enhanced droplet precautions.

## 2020-10-21 ENCOUNTER — LAB REQUISITION (OUTPATIENT)
Dept: LAB | Facility: HOSPITAL | Age: 85
End: 2020-10-21

## 2020-10-21 DIAGNOSIS — R53.1 WEAKNESS: ICD-10-CM

## 2020-10-21 LAB
BACTERIA UR QL AUTO: ABNORMAL /HPF
BILIRUB UR QL STRIP: NEGATIVE
CLARITY UR: ABNORMAL
COLOR UR: YELLOW
GLUCOSE UR STRIP-MCNC: NEGATIVE MG/DL
HGB UR QL STRIP.AUTO: ABNORMAL
HYALINE CASTS UR QL AUTO: ABNORMAL /LPF
KETONES UR QL STRIP: NEGATIVE
LEUKOCYTE ESTERASE UR QL STRIP.AUTO: ABNORMAL
NITRITE UR QL STRIP: NEGATIVE
PH UR STRIP.AUTO: <=5 [PH] (ref 4.5–8)
PROT UR QL STRIP: NEGATIVE
RBC # UR: ABNORMAL /HPF
REF LAB TEST METHOD: ABNORMAL
SP GR UR STRIP: 1.01 (ref 1–1.03)
SQUAMOUS #/AREA URNS HPF: ABNORMAL /HPF
UROBILINOGEN UR QL STRIP: ABNORMAL
WBC UR QL AUTO: ABNORMAL /HPF

## 2020-10-21 PROCEDURE — 87086 URINE CULTURE/COLONY COUNT: CPT | Performed by: FAMILY MEDICINE

## 2020-10-21 PROCEDURE — 81001 URINALYSIS AUTO W/SCOPE: CPT | Performed by: FAMILY MEDICINE

## 2020-10-22 LAB — BACTERIA SPEC AEROBE CULT: NORMAL

## 2020-11-10 NOTE — PLAN OF CARE
Goal Outcome Evaluation:  Plan of Care Reviewed With: patient  Progress: no change  Outcome Summary: Pt has no c/o pain.  Pt waiting on pre-cert for dc. Eliquis started for bilat PEs. 2L nc. VSS. Continue to monitor. Safety maintained.   No

## 2021-01-25 ENCOUNTER — LAB REQUISITION (OUTPATIENT)
Dept: LAB | Facility: HOSPITAL | Age: 86
End: 2021-01-25

## 2021-01-25 DIAGNOSIS — R53.1 WEAKNESS: ICD-10-CM

## 2021-01-25 LAB
BACTERIA UR QL AUTO: ABNORMAL /HPF
BILIRUB UR QL STRIP: NEGATIVE
CLARITY UR: CLEAR
COLOR UR: YELLOW
GLUCOSE UR STRIP-MCNC: NEGATIVE MG/DL
HGB UR QL STRIP.AUTO: ABNORMAL
HYALINE CASTS UR QL AUTO: ABNORMAL /LPF
KETONES UR QL STRIP: NEGATIVE
LEUKOCYTE ESTERASE UR QL STRIP.AUTO: ABNORMAL
NITRITE UR QL STRIP: NEGATIVE
PH UR STRIP.AUTO: 5.5 [PH] (ref 4.5–8)
PROT UR QL STRIP: ABNORMAL
RBC # UR: ABNORMAL /HPF
REF LAB TEST METHOD: ABNORMAL
SP GR UR STRIP: 1.02 (ref 1–1.03)
SQUAMOUS #/AREA URNS HPF: ABNORMAL /HPF
UROBILINOGEN UR QL STRIP: ABNORMAL
WBC UR QL AUTO: ABNORMAL /HPF

## 2021-01-25 PROCEDURE — 87077 CULTURE AEROBIC IDENTIFY: CPT | Performed by: NURSE PRACTITIONER

## 2021-01-25 PROCEDURE — 87186 SC STD MICRODIL/AGAR DIL: CPT | Performed by: NURSE PRACTITIONER

## 2021-01-25 PROCEDURE — 81001 URINALYSIS AUTO W/SCOPE: CPT | Performed by: NURSE PRACTITIONER

## 2021-01-25 PROCEDURE — 87086 URINE CULTURE/COLONY COUNT: CPT | Performed by: NURSE PRACTITIONER

## 2021-01-27 LAB — BACTERIA SPEC AEROBE CULT: ABNORMAL
